# Patient Record
Sex: FEMALE | Race: WHITE | NOT HISPANIC OR LATINO | Employment: FULL TIME | URBAN - METROPOLITAN AREA
[De-identification: names, ages, dates, MRNs, and addresses within clinical notes are randomized per-mention and may not be internally consistent; named-entity substitution may affect disease eponyms.]

---

## 2017-03-03 ENCOUNTER — OFFICE VISIT (OUTPATIENT)
Dept: RETAIL CLINIC | Facility: CLINIC | Age: 22
End: 2017-03-03

## 2017-03-03 VITALS
TEMPERATURE: 99 F | OXYGEN SATURATION: 99 % | DIASTOLIC BLOOD PRESSURE: 75 MMHG | HEIGHT: 68 IN | WEIGHT: 110 LBS | HEART RATE: 102 BPM | SYSTOLIC BLOOD PRESSURE: 100 MMHG | BODY MASS INDEX: 16.67 KG/M2

## 2017-03-03 DIAGNOSIS — H61.23 EXCESSIVE CERUMEN IN EAR CANAL, BILATERAL: ICD-10-CM

## 2017-03-03 DIAGNOSIS — R68.89 INFLUENZA-LIKE SYMPTOMS: Primary | ICD-10-CM

## 2017-03-03 LAB
EXPIRATION DATE: NORMAL
FLUAV AG NPH QL: NEGATIVE
FLUBV AG NPH QL: NEGATIVE
INTERNAL CONTROL: NORMAL
Lab: NORMAL

## 2017-03-03 PROCEDURE — 99213 OFFICE O/P EST LOW 20 MIN: CPT | Performed by: NURSE PRACTITIONER

## 2017-03-03 PROCEDURE — 87804 INFLUENZA ASSAY W/OPTIC: CPT | Performed by: NURSE PRACTITIONER

## 2017-03-03 RX ORDER — OSELTAMIVIR PHOSPHATE 75 MG/1
75 CAPSULE ORAL 2 TIMES DAILY
Qty: 10 CAPSULE | Refills: 0 | Status: SHIPPED | OUTPATIENT
Start: 2017-03-03 | End: 2017-03-08

## 2017-03-03 RX ORDER — UREA 10 %
LOTION (ML) TOPICAL
COMMUNITY
End: 2018-10-09

## 2017-03-03 RX ORDER — GUAIFENESIN 600 MG/1
1200 TABLET, EXTENDED RELEASE ORAL 2 TIMES DAILY
Start: 2017-03-03 | End: 2018-10-09

## 2017-03-03 RX ORDER — SERTRALINE HYDROCHLORIDE 100 MG/1
100 TABLET, FILM COATED ORAL DAILY
COMMUNITY
End: 2018-10-09

## 2017-03-03 RX ORDER — DEXTROMETHORPHAN HYDROBROMIDE AND PROMETHAZINE HYDROCHLORIDE 15; 6.25 MG/5ML; MG/5ML
5 SYRUP ORAL 4 TIMES DAILY PRN
Qty: 100 ML | Refills: 0 | Status: SHIPPED | OUTPATIENT
Start: 2017-03-03 | End: 2017-03-10

## 2017-03-03 RX ORDER — DEXTROAMPHETAMINE SACCHARATE, AMPHETAMINE ASPARTATE MONOHYDRATE, DEXTROAMPHETAMINE SULFATE AND AMPHETAMINE SULFATE 2.5; 2.5; 2.5; 2.5 MG/1; MG/1; MG/1; MG/1
10 CAPSULE, EXTENDED RELEASE ORAL EVERY MORNING
COMMUNITY
End: 2018-10-09

## 2017-03-03 RX ORDER — DOXEPIN HYDROCHLORIDE 10 MG/1
10 CAPSULE ORAL NIGHTLY
COMMUNITY
End: 2018-10-09

## 2017-03-03 NOTE — PROGRESS NOTES
Shanique Humphrey is a 21 y.o. female presents with the following:    Flu Symptoms   This is a new problem. The current episode started yesterday. The problem occurs constantly. The problem has been gradually worsening. Associated symptoms include anorexia, chills, coughing, fatigue, a fever, myalgias and a sore throat. Pertinent negatives include no congestion. The symptoms are aggravated by exertion. She has tried acetaminophen for the symptoms. The treatment provided no relief.       The following portions of the patient's history were reviewed and updated as appropriate: allergies, current medications, past family history, past medical history, past social history, past surgical history and problem list.    Review of Systems   Constitutional: Positive for chills, fatigue and fever.   HENT: Positive for postnasal drip and sore throat. Negative for congestion, ear pain and rhinorrhea.    Eyes: Negative.    Respiratory: Positive for cough.    Cardiovascular: Negative.    Gastrointestinal: Positive for anorexia.   Musculoskeletal: Positive for myalgias.   Skin: Negative.    Psychiatric/Behavioral: Negative.        Objective   Physical Exam   Constitutional: She is oriented to person, place, and time. She appears well-developed. She appears ill.   HENT:   Right Ear: External ear and ear canal normal. Right ear middle ear effusion: mild.   Left Ear: External ear and ear canal normal. Left ear middle ear effusion: mild.   Nose: Nose normal.   Mouth/Throat: Mucous membranes are normal. Posterior oropharyngeal erythema present. No tonsillar exudate.   Unable to view TM- impacted cerumen     Eyes: Pupils are equal, round, and reactive to light.   Cardiovascular: Normal rate, regular rhythm and normal heart sounds.    Pulmonary/Chest: Effort normal and breath sounds normal.   Lymphadenopathy:     She has cervical adenopathy.        Right cervical: Superficial cervical adenopathy present.        Left cervical:  Superficial cervical adenopathy present.   Neurological: She is alert and oriented to person, place, and time.   Skin: Skin is warm and dry. There is pallor.   Vitals reviewed.    Vitals:    03/03/17 1449   BP: 100/75   Pulse: 102   Temp: 99 °F (37.2 °C)   SpO2: 99%     Assessment/Plan   Kelly was seen today for flu symptoms.    Diagnoses and all orders for this visit:    Influenza-like symptoms  -     POCT Influenza A/B  -     oseltamivir (TAMIFLU) 75 MG capsule; Take 1 capsule by mouth 2 (Two) Times a Day for 5 days.  -     promethazine-dextromethorphan (PROMETHAZINE-DM) 6.25-15 MG/5ML syrup; Take 5 mL by mouth 4 (Four) Times a Day As Needed for cough for up to 7 days.  -     guaiFENesin (MUCINEX) 600 MG 12 hr tablet; Take 2 tablets by mouth 2 (Two) Times a Day.    Excessive cerumen in ear canal, bilateral  -     carbamide peroxide (DEBROX) 6.5 % otic solution; Administer 5 drops into both ears 2 (Two) Times a Day for 4 days.       Reviewed home care instructions, and patient verbalized understanding. Patient instructed to follow up with PCP and/or ED for worsening or non-resolving symptoms.     MIGUEL Conrad

## 2017-03-03 NOTE — PATIENT INSTRUCTIONS
"Influenza, Adult  Influenza (\"the flu\") is a viral infection of the respiratory tract. It occurs more often in winter months because people spend more time in close contact with one another. Influenza can make you feel very sick. Influenza easily spreads from person to person (contagious).  CAUSES   Influenza is caused by a virus that infects the respiratory tract. You can catch the virus by breathing in droplets from an infected person's cough or sneeze. You can also catch the virus by touching something that was recently contaminated with the virus and then touching your mouth, nose, or eyes.  RISKS AND COMPLICATIONS  You may be at risk for a more severe case of influenza if you smoke cigarettes, have diabetes, have chronic heart disease (such as heart failure) or lung disease (such as asthma), or if you have a weakened immune system. Elderly people and pregnant women are also at risk for more serious infections. The most common problem of influenza is a lung infection (pneumonia). Sometimes, this problem can require emergency medical care and may be life threatening.  SIGNS AND SYMPTOMS   Symptoms typically last 4 to 10 days and may include:  · Fever.  · Chills.  · Headache, body aches, and muscle aches.  · Sore throat.  · Chest discomfort and cough.  · Poor appetite.  · Weakness or feeling tired.  · Dizziness.  · Nausea or vomiting.  DIAGNOSIS   Diagnosis of influenza is often made based on your history and a physical exam. A nose or throat swab test can be done to confirm the diagnosis.  TREATMENT   In mild cases, influenza goes away on its own. Treatment is directed at relieving symptoms. For more severe cases, your health care provider may prescribe antiviral medicines to shorten the sickness. Antibiotic medicines are not effective because the infection is caused by a virus, not by bacteria.  HOME CARE INSTRUCTIONS  · Take medicines only as directed by your health care provider.  · Use a cool mist humidifier " to make breathing easier.  · Get plenty of rest until your temperature returns to normal. This usually takes 3 to 4 days.  · Drink enough fluid to keep your urine clear or pale yellow.  · Cover your mouth and nose when coughing or sneezing, and wash your hands well to prevent the virus from spreading.  · Stay home from work or school until the fever is gone for at least 1 full day.  PREVENTION   An annual influenza vaccination (flu shot) is the best way to avoid getting influenza. An annual flu shot is now routinely recommended for all adults in the [place]  SEEK MEDICAL CARE IF:  · You experience chest pain, your cough worsens, or you produce more mucus.  · You have nausea, vomiting, or diarrhea.  · Your fever returns or gets worse.  SEEK IMMEDIATE MEDICAL CARE IF:  · You have trouble breathing, you become short of breath, or your skin or nails become bluish.  · You have severe pain or stiffness in the neck.  · You develop a sudden headache, or pain in the face or ear.  · You have nausea or vomiting that you cannot control.  MAKE SURE YOU:   · Understand these instructions.  · Will watch your condition.  · Will get help right away if you are not doing well or get worse.     This information is not intended to replace advice given to you by your health care provider. Make sure you discuss any questions you have with your health care provider.     Document Released: 12/15/2001 Document Revised: 01/08/2016 Document Reviewed: 10/11/2016  [employer] Patient Education ©2016 [employer]

## 2020-01-06 ENCOUNTER — TELEPHONE (OUTPATIENT)
Dept: URGENT CARE | Facility: CLINIC | Age: 25
End: 2020-01-06

## 2020-01-06 NOTE — TELEPHONE ENCOUNTER
MsDai Humphrey called stating the augmentin is upsetting her stomach, advised will change to Doxycyline BID x 7 ways. RX sent to [facility].

## 2020-03-03 ENCOUNTER — LAB (OUTPATIENT)
Dept: LAB | Facility: HOSPITAL | Age: 25
End: 2020-03-03

## 2020-03-03 ENCOUNTER — OFFICE VISIT (OUTPATIENT)
Dept: INTERNAL MEDICINE | Facility: CLINIC | Age: 25
End: 2020-03-03

## 2020-03-03 VITALS
TEMPERATURE: 97.6 F | RESPIRATION RATE: 16 BRPM | WEIGHT: 118.6 LBS | SYSTOLIC BLOOD PRESSURE: 102 MMHG | OXYGEN SATURATION: 98 % | BODY MASS INDEX: 17.98 KG/M2 | HEART RATE: 62 BPM | DIASTOLIC BLOOD PRESSURE: 64 MMHG | HEIGHT: 68 IN

## 2020-03-03 DIAGNOSIS — Z00.00 HEALTHCARE MAINTENANCE: ICD-10-CM

## 2020-03-03 DIAGNOSIS — G43.709 CHRONIC MIGRAINE WITHOUT AURA WITHOUT STATUS MIGRAINOSUS, NOT INTRACTABLE: ICD-10-CM

## 2020-03-03 DIAGNOSIS — F90.9 ATTENTION DEFICIT HYPERACTIVITY DISORDER (ADHD), UNSPECIFIED ADHD TYPE: Primary | ICD-10-CM

## 2020-03-03 DIAGNOSIS — Z30.41 ENCOUNTER FOR SURVEILLANCE OF CONTRACEPTIVE PILLS: ICD-10-CM

## 2020-03-03 LAB
ALBUMIN SERPL-MCNC: 4.3 G/DL (ref 3.5–5.2)
ALBUMIN/GLOB SERPL: 1.3 G/DL
ALP SERPL-CCNC: 32 U/L (ref 39–117)
ALT SERPL W P-5'-P-CCNC: 11 U/L (ref 1–33)
ANION GAP SERPL CALCULATED.3IONS-SCNC: 13 MMOL/L (ref 5–15)
AST SERPL-CCNC: 17 U/L (ref 1–32)
BILIRUB SERPL-MCNC: 0.6 MG/DL (ref 0.2–1.2)
BUN BLD-MCNC: 9 MG/DL (ref 6–20)
BUN/CREAT SERPL: 11.8 (ref 7–25)
CALCIUM SPEC-SCNC: 9.4 MG/DL (ref 8.6–10.5)
CHLORIDE SERPL-SCNC: 103 MMOL/L (ref 98–107)
CO2 SERPL-SCNC: 24 MMOL/L (ref 22–29)
CREAT BLD-MCNC: 0.76 MG/DL (ref 0.57–1)
DEPRECATED RDW RBC AUTO: 43.9 FL (ref 37–54)
ERYTHROCYTE [DISTWIDTH] IN BLOOD BY AUTOMATED COUNT: 13.6 % (ref 12.3–15.4)
GFR SERPL CREATININE-BSD FRML MDRD: 93 ML/MIN/1.73
GLOBULIN UR ELPH-MCNC: 3.2 GM/DL
GLUCOSE BLD-MCNC: 75 MG/DL (ref 65–99)
HCT VFR BLD AUTO: 36.9 % (ref 34–46.6)
HGB BLD-MCNC: 12.3 G/DL (ref 12–15.9)
MCH RBC QN AUTO: 29.2 PG (ref 26.6–33)
MCHC RBC AUTO-ENTMCNC: 33.3 G/DL (ref 31.5–35.7)
MCV RBC AUTO: 87.6 FL (ref 79–97)
PLATELET # BLD AUTO: 374 10*3/MM3 (ref 140–450)
PMV BLD AUTO: 11.4 FL (ref 6–12)
POTASSIUM BLD-SCNC: 4.4 MMOL/L (ref 3.5–5.2)
PROT SERPL-MCNC: 7.5 G/DL (ref 6–8.5)
RBC # BLD AUTO: 4.21 10*6/MM3 (ref 3.77–5.28)
SODIUM BLD-SCNC: 140 MMOL/L (ref 136–145)
WBC NRBC COR # BLD: 6.74 10*3/MM3 (ref 3.4–10.8)

## 2020-03-03 PROCEDURE — 99204 OFFICE O/P NEW MOD 45 MIN: CPT | Performed by: INTERNAL MEDICINE

## 2020-03-03 PROCEDURE — 80053 COMPREHEN METABOLIC PANEL: CPT | Performed by: INTERNAL MEDICINE

## 2020-03-03 PROCEDURE — 85027 COMPLETE CBC AUTOMATED: CPT | Performed by: INTERNAL MEDICINE

## 2020-03-03 RX ORDER — NORETHINDRONE ACETATE AND ETHINYL ESTRADIOL 1; 5 MG/1; UG/1
1 TABLET ORAL DAILY
Qty: 28 TABLET | Refills: 5 | Status: SHIPPED | OUTPATIENT
Start: 2020-03-03 | End: 2020-06-17

## 2020-03-03 NOTE — PROGRESS NOTES
Internal Medicine New Patient  Kelly Humphrey is a 24 y.o. female who presents today to establish care and with concerns as outlined below.    Chief Complaint  Chief Complaint   Patient presents with   • Establish Care        HPI  Ms. Humphrey comes in today to establish care. 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 Plans maybe to go back to graduate school for veterinary sciences. She has not yet established care in town. Her doctor in [place] was Dr. Forrester. She is only taking an OCP at this time. Her last pap smear was over a year ago at  [email or web address]. Normal.    She has acne and previously was treated by her dermatologist in [place].     She has a history of ADHD and was on treatment through  psychiatry. She took aderall which helped her to concentrate. Since finishing school she has stopped taking aderall and no longer has a psychiatrist. She notes difficulty concentrating when reading and doing tasks at home predominantly. Not having any issues at work. She is interested in resuming aderall, however, given that she plans to potentially enroll in grad school.    She has migraines around time of menstruation. No aura. Treats with excedrin migraine and rest. She states that she works to stay well hydrated to help prevent migraines.       Review of Systems  Review of Systems   Constitutional: Negative.    Eyes: Negative.    Respiratory: Negative.    Cardiovascular: Negative.    Gastrointestinal: Negative.    Genitourinary: Negative.    Musculoskeletal: Negative.    Skin: Positive for rash (acne).   Neurological: Positive for headache (migraines).   Psychiatric/Behavioral: Positive for behavioral problems and decreased concentration. Negative for depressed mood. The patient is nervous/anxious.         Past Medical History  Past Medical History:   Diagnosis Date   • ADHD (attention deficit hyperactivity disorder)    • Anxiety      "    Surgical History  Past Surgical History:   Procedure Laterality Date   • TONSILLECTOMY  2017        Family History  Family History   Adopted: Yes   Family history unknown: Yes        Social History  Social History     Socioeconomic History   • Marital status: Single     Spouse name: Not on file   • Number of children: Not on file   • Years of education: Not on file   • Highest education level: Not on file   Tobacco Use   • Smoking status: Never Smoker   • Smokeless tobacco: Never Used   Substance and Sexual Activity   • Alcohol use: No   • Drug use: No   • Sexual activity: Defer        Current Medications  Current Outpatient Medications on File Prior to Visit   Medication Sig Dispense Refill   • norethindrone-ethinyl estradiol (FEMHRT 1/5) 1-5 MG-MCG tablet Take  by mouth Daily.       No current facility-administered medications on file prior to visit.        Allergies  Allergies   Allergen Reactions   • Augmentin [Amoxicillin-Pot Clavulanate] Hives        Objective  Visit Vitals  /64   Pulse 62   Temp 97.6 °F (36.4 °C)   Resp 16   Ht 172.7 cm (67.99\")   Wt 53.8 kg (118 lb 9.6 oz)   SpO2 98%   BMI 18.04 kg/m²        Physical Exam  Physical Exam   Constitutional: She is oriented to person, place, and time. She appears well-developed and well-nourished. No distress.   HENT:   Head: Normocephalic and atraumatic.   Right Ear: External ear normal.   Left Ear: External ear normal.   Nose: Nose normal.   Mouth/Throat: Oropharynx is clear and moist. No oropharyngeal exudate.   Eyes: Pupils are equal, round, and reactive to light. Conjunctivae and EOM are normal. No scleral icterus.   Neck: Neck supple. No thyromegaly present.   Cardiovascular: Normal rate, regular rhythm, normal heart sounds and intact distal pulses.   No murmur heard.  Pulmonary/Chest: Effort normal and breath sounds normal. No respiratory distress.   Abdominal: Soft. Bowel sounds are normal. She exhibits no distension. There is no tenderness. "   Musculoskeletal: She exhibits no edema or deformity.   Lymphadenopathy:     She has no cervical adenopathy.   Neurological: She is alert and oriented to person, place, and time.   Skin: Skin is warm and dry. No rash noted. She is not diaphoretic.   Acne along mandible    Psychiatric: She has a normal mood and affect. Her behavior is normal. Judgment and thought content normal.   Nursing note and vitals reviewed.       Results  No results associated with this encounter.     Assessment and Plan  Kelly was seen today for establish care.    Diagnoses and all orders for this visit:    Attention deficit hyperactivity disorder (ADHD), unspecified ADHD type  - Previously treated with aderall by  psychiatry but no longer a student so has discontinued medication and no longer seeing psychiatry there.  - She is worried that she will not be able to perform well in grad school and is interested in starting aderall again. Referred to psychiatry. Discussed that this process is different than other referrals and I recommended she begin looking for a psychiatrist now.    Chronic migraine without aura without status migrainosus, not intractable  - Occurs once a month at onset of menstruation and is treated with OTC pain relievers and rest    Encounter for surveillance of contraceptive pills  - Started on OCP previously and requests refill today  - She denies history of blood clots, family hx of clotting disorder, migraine with aura.  - Pap smear reportedly last year, normal. Records requested from [facility].  - OCP refilled.    Healthcare maintenance  - CBC and CMP ordered    Health Maintenance   Topic Date Due   • ANNUAL PHYSICAL  06/29/1998   • HPV VACCINES (1 - Female 2-dose series) 06/29/2006   • TDAP/TD VACCINES (1 - Tdap) 06/29/2006   • INFLUENZA VACCINE  08/01/2019   • CHLAMYDIA SCREENING  01/06/2020   • PAP SMEAR  01/06/2020     Health Maintenance  - Pap smear: She reports last pap smear a year ago at [facility]. Results normal. Records requested.  - Mammogram: Start screening at age 40.  - Colonoscopy: Start screening at age 50.  - Immunizations: Flu unavailable in office, recommended getting immunized at local pharmacy. She believes Tdap is UTD. Records from [facility] requested.  - Depression screening: negative 3/2020    Return in about 1 year (around 3/3/2021) for Annual, Labs today.

## 2020-03-23 ENCOUNTER — E-VISIT (OUTPATIENT)
Dept: INTERNAL MEDICINE | Facility: CLINIC | Age: 25
End: 2020-03-23

## 2020-03-24 NOTE — PROGRESS NOTES
After review of patient's E visit questionnaire it was decided that it would be in her best interest to schedule an in person office visit rather than an e visit so that diagnostic testing could be obtained. E visit was not completed and patient should not be billed. She was informed of these recommendations via [email or web address] messaging.

## 2020-03-26 ENCOUNTER — APPOINTMENT (OUTPATIENT)
Dept: LAB | Facility: HOSPITAL | Age: 25
End: 2020-03-26

## 2020-03-26 ENCOUNTER — OFFICE VISIT (OUTPATIENT)
Dept: INTERNAL MEDICINE | Facility: CLINIC | Age: 25
End: 2020-03-26

## 2020-03-26 VITALS
WEIGHT: 120 LBS | HEIGHT: 68 IN | HEART RATE: 76 BPM | TEMPERATURE: 97.6 F | DIASTOLIC BLOOD PRESSURE: 60 MMHG | OXYGEN SATURATION: 96 % | BODY MASS INDEX: 18.19 KG/M2 | SYSTOLIC BLOOD PRESSURE: 100 MMHG

## 2020-03-26 DIAGNOSIS — R35.0 URINARY FREQUENCY: Primary | ICD-10-CM

## 2020-03-26 DIAGNOSIS — Z76.89 ENCOUNTER FOR ASSESSMENT OF SEXUALLY TRANSMITTED DISEASE EXPOSURE: ICD-10-CM

## 2020-03-26 DIAGNOSIS — A74.9 CHLAMYDIA: ICD-10-CM

## 2020-03-26 LAB
B-HCG UR QL: NEGATIVE
BILIRUB BLD-MCNC: NEGATIVE MG/DL
CLARITY, POC: ABNORMAL
COLOR UR: ABNORMAL
GLUCOSE UR STRIP-MCNC: NEGATIVE MG/DL
INTERNAL NEGATIVE CONTROL: NEGATIVE
INTERNAL POSITIVE CONTROL: POSITIVE
KETONES UR QL: NEGATIVE
LEUKOCYTE EST, POC: ABNORMAL
Lab: NORMAL
NITRITE UR-MCNC: NEGATIVE MG/ML
PH UR: 6 [PH] (ref 5–8)
PROT UR STRIP-MCNC: NEGATIVE MG/DL
RBC # UR STRIP: NEGATIVE /UL
SP GR UR: 1.03 (ref 1–1.03)
UROBILINOGEN UR QL: NORMAL

## 2020-03-26 PROCEDURE — 99213 OFFICE O/P EST LOW 20 MIN: CPT | Performed by: NURSE PRACTITIONER

## 2020-03-26 PROCEDURE — 87491 CHLMYD TRACH DNA AMP PROBE: CPT | Performed by: NURSE PRACTITIONER

## 2020-03-26 PROCEDURE — 87591 N.GONORRHOEAE DNA AMP PROB: CPT | Performed by: NURSE PRACTITIONER

## 2020-03-26 PROCEDURE — 87086 URINE CULTURE/COLONY COUNT: CPT | Performed by: NURSE PRACTITIONER

## 2020-03-26 PROCEDURE — 81025 URINE PREGNANCY TEST: CPT | Performed by: NURSE PRACTITIONER

## 2020-03-26 PROCEDURE — 87661 TRICHOMONAS VAGINALIS AMPLIF: CPT | Performed by: NURSE PRACTITIONER

## 2020-03-26 PROCEDURE — 81003 URINALYSIS AUTO W/O SCOPE: CPT | Performed by: NURSE PRACTITIONER

## 2020-03-26 RX ORDER — NITROFURANTOIN 25; 75 MG/1; MG/1
100 CAPSULE ORAL EVERY 12 HOURS SCHEDULED
Qty: 10 CAPSULE | Refills: 0 | Status: SHIPPED | OUTPATIENT
Start: 2020-03-26 | End: 2020-03-30

## 2020-03-26 NOTE — PROGRESS NOTES
Chief Complaint   Patient presents with   • Urinary Tract Infection     Wants a STI check       History of Present Illness  24 y.o.female presents for uti sx.  C/o Urinary frequency; no dysuria but feels like not emptying bladder all the way.  No abd or flank pain.  No fever.  Some cloudy vaginal discharge no vaginal pain. No pain with sex. Positive new sex partner couple weeks ago. Wanting std check on urine.  No n/v/d.    Review of Systems   Constitutional: Negative for chills, fatigue and fever.   Respiratory: Negative for shortness of breath.    Cardiovascular: Negative for chest pain.   Gastrointestinal: Negative for abdominal pain, constipation, diarrhea, nausea and vomiting.   Genitourinary: Positive for frequency. Negative for difficulty urinating, dysuria, flank pain, hematuria and urgency.   Musculoskeletal: Negative for myalgias.         [facility]  The following portions of the patient's history were reviewed and updated as appropriate: allergies, current medications, past family history, past medical history, past social history, past surgical history and problem list.     Past Medical History:   Diagnosis Date   • ADHD (attention deficit hyperactivity disorder)    • Anxiety       Past Surgical History:   Procedure Laterality Date   • TONSILLECTOMY  2017      Allergies   Allergen Reactions   • Augmentin [Amoxicillin-Pot Clavulanate] Hives      Social History     Socioeconomic History   • Marital status: Single     Spouse name: Not on file   • Number of children: Not on file   • Years of education: Not on file   • Highest education level: Not on file   Tobacco Use   • Smoking status: Never Smoker   • Smokeless tobacco: Never Used   Substance and Sexual Activity   • Alcohol use: Yes     Comment: occasional beer, once every 2 weeks   • Drug use: No   • Sexual activity: Yes     Partners: Male     Birth control/protection: OCP, Condom     Comment: single, steady partner     Family History   Adopted: Yes   Family  "history unknown: Yes            Current Outpatient Medications:   •  norethindrone-ethinyl estradiol (FEMHRT 1/5) 1-5 MG-MCG tablet, Take 1 tablet by mouth Daily., Disp: 28 tablet, Rfl: 5    VITALS:  /60   Pulse 76   Temp 97.6 °F (36.4 °C)   Ht 172.7 cm (68\")   Wt 54.4 kg (120 lb)   SpO2 96%   BMI 18.25 kg/m²     Physical Exam   Constitutional: She is oriented to person, place, and time. She appears well-developed and well-nourished. No distress.   Cardiovascular: Normal rate.   Pulmonary/Chest: Effort normal.   Abdominal: There is no tenderness. There is no CVA tenderness.   Neurological: She is alert and oriented to person, place, and time.   Skin: Skin is warm and dry. No rash noted.       LABS  Results for orders placed or performed in visit on 03/26/20   Chlamydia trachomatis, Neisseria gonorrhoeae, PCR - Urine, Urine, Clean Catch   Result Value Ref Range    Chlamydia trachomatis, GUY Positive (A) Negative    Neisseria gonorrhoeae, GUY Negative Negative   Trichomonas vaginalis, PCR - Urine, Urine, Clean Catch   Result Value Ref Range    Trichomonas vaginosis Negative Negative   Urine Culture - Urine, Urine, Clean Catch   Result Value Ref Range    Urine Culture >100,000 CFU/mL Lactobacillus species (A)    POCT urinalysis dipstick, automated   Result Value Ref Range    Color Dark Yellow Yellow, Straw, Dark Yellow, Ana    Clarity, UA Cloudy (A) Clear    Specific Gravity  1.030 1.005 - 1.030    pH, Urine 6.0 5.0 - 8.0    Leukocytes Trace (A) Negative    Nitrite, UA Negative Negative    Protein, POC Negative Negative mg/dL    Glucose, UA Negative Negative, 1000 mg/dL (3+) mg/dL    Ketones, UA Negative Negative    Urobilinogen, UA Normal Normal    Bilirubin Negative Negative    Blood, UA Negative Negative   POCT pregnancy, urine   Result Value Ref Range    HCG, Urine, QL Negative Negative    Lot Number [id number]     Internal Positive Control Positive     Internal Negative Control Negative  " "      ASSESSMENT/PLAN  Kelly was seen today for urinary tract infection.    Diagnoses and all orders for this visit:    Urinary frequency  -     POCT urinalysis dipstick, automated  -     POCT pregnancy, urine  -     Urine Culture - Urine, Urine, Clean Catch  -     nitrofurantoin, macrocrystal-monohydrate, (MACROBID) 100 MG capsule; Take 1 capsule by mouth Every 12 (Twelve) Hours for 5 days.  Discussed with pt; started tx for UTI while awaiting results.   ADDENDUM:   Can stop macrobid.  Lactobacillus noted on urine culture; likely normal nidhi contaminant in setting of STD.    Encounter for assessment of sexually transmitted disease exposure  -     Chlamydia trachomatis, Neisseria gonorrhoeae, PCR - Urine, Urine, Clean Catch  -     Trichomonas vaginalis, PCR - Urine, Urine, Clean Catch    ADDENDUM\"  Chlamydia  Comments:  azithromycin 500mg tab; take 2 tabs by mouth once.  abstain from sexual intercourse for 7 days.  Partner needs treatment during same timeframe.  Advised of safe sex practices.      I discussed the patients findings and my recommendations with patient.  Patient was encouraged to keep me informed of any acute changes, lack of improvement, or any new concerning symptoms.  Patient voiced understanding of all instructions and denied further questions.      FOLLOW-UP  Return if symptoms worsen or fail to improve.    Electronically signed by:    MIGUEL Garnica  03/26/2020    EMR Dragon/Transcription Disclaimer:  Much of this encounter note is an electronic transcription/translation of spoken language to printed text.  The electronic translation of spoken language may permit erroneous, or at times, nonsensical words or phrases to be inadvertently transcribed.  Although I have reviewed the note for such errors, some may still exist    "

## 2020-03-28 LAB — BACTERIA SPEC AEROBE CULT: ABNORMAL

## 2020-03-30 ENCOUNTER — TELEPHONE (OUTPATIENT)
Dept: INTERNAL MEDICINE | Facility: CLINIC | Age: 25
End: 2020-03-30

## 2020-03-30 LAB
C TRACH RRNA SPEC DONR QL NAA+PROBE: POSITIVE
N GONORRHOEA DNA SPEC QL NAA+PROBE: NEGATIVE
T VAGINALIS RRNA GENITAL QL PROBE: NEGATIVE

## 2020-03-30 RX ORDER — AZITHROMYCIN 500 MG/1
1000 TABLET, FILM COATED ORAL ONCE
Qty: 2 TABLET | Refills: 0 | Status: SHIPPED | OUTPATIENT
Start: 2020-03-30 | End: 2020-03-30

## 2020-03-30 NOTE — TELEPHONE ENCOUNTER
I called pt [facility]. (when she returns call if you will please transfer call to my office, as I need to speak with pt.)

## 2020-03-30 NOTE — TELEPHONE ENCOUNTER
I spoke to pt; advised positive chlamydia. I sent rx azithromycin. Stop macrobid. lactobacillus in urine is human nidhi found in vagina; likely contaminant in setting of std. Advised no sex for 7 days; partner also needs to be treated during same timeframe. Advised safe sex practices condom use. Pt verbalized understanding and no further questions.

## 2020-05-28 ENCOUNTER — E-VISIT (OUTPATIENT)
Dept: INTERNAL MEDICINE | Facility: CLINIC | Age: 25
End: 2020-05-28

## 2020-05-28 DIAGNOSIS — N30.00 ACUTE CYSTITIS WITHOUT HEMATURIA: Primary | ICD-10-CM

## 2020-05-28 PROCEDURE — 99421 OL DIG E/M SVC 5-10 MIN: CPT | Performed by: NURSE PRACTITIONER

## 2020-05-29 RX ORDER — NITROFURANTOIN 25; 75 MG/1; MG/1
100 CAPSULE ORAL EVERY 12 HOURS SCHEDULED
Qty: 10 CAPSULE | Refills: 0 | OUTPATIENT
Start: 2020-05-29 | End: 2020-06-03

## 2020-06-12 NOTE — PROGRESS NOTES
Subjective   Kelly Humphrey is a 24 y.o. female.   Chief complaint UTI symptoms  History of Present Illness   Complains of frequent urination and urgency with cloudy urine onset more than 1 week.  Positive vaginal discharge with white slightly yellow.  No fever.  Had been seen a few months back and tested positive for STI treated with antibiotics.    The following portions of the patient's history were reviewed and updated as appropriate: allergies, current medications, past family history, past medical history, past social history, past surgical history and problem list.    Review of Systems   General negative fever   positive dysuria and urinary frequency.  Positive vaginal discharge.  Objective   Physical Exam  None telehealth  Assessment/Plan   Problems Addressed this Visit     None      Visit Diagnoses     Acute cystitis without hematuria    -  Primary      Rx for nitrofurantoin Macrobid 100 mg capsule by mouth 1 capsule every 12 hours for 5 days.  Increase water intake.  If symptoms do not resolve return for follow-up appointment or follow-up with PCP.  E visit total time 10 minutes  Cinda RIVERA

## 2020-06-17 ENCOUNTER — OFFICE VISIT (OUTPATIENT)
Dept: INTERNAL MEDICINE | Facility: CLINIC | Age: 25
End: 2020-06-17

## 2020-06-17 VITALS
TEMPERATURE: 98.5 F | OXYGEN SATURATION: 99 % | WEIGHT: 116 LBS | HEART RATE: 76 BPM | RESPIRATION RATE: 16 BRPM | BODY MASS INDEX: 17.58 KG/M2 | DIASTOLIC BLOOD PRESSURE: 62 MMHG | HEIGHT: 68 IN | SYSTOLIC BLOOD PRESSURE: 100 MMHG

## 2020-06-17 DIAGNOSIS — L30.9 DERMATITIS: ICD-10-CM

## 2020-06-17 DIAGNOSIS — Z30.41 ENCOUNTER FOR SURVEILLANCE OF CONTRACEPTIVE PILLS: Primary | ICD-10-CM

## 2020-06-17 PROCEDURE — 99213 OFFICE O/P EST LOW 20 MIN: CPT | Performed by: INTERNAL MEDICINE

## 2020-06-17 RX ORDER — DROSPIRENONE, ETHINYL ESTRADIOL AND LEVOMEFOLATE CALCIUM AND LEVOMEFOLATE CALCIUM 3-0.02(24)
KIT ORAL
COMMUNITY
End: 2020-06-17 | Stop reason: SDUPTHER

## 2020-06-17 RX ORDER — DROSPIRENONE, ETHINYL ESTRADIOL AND LEVOMEFOLATE CALCIUM AND LEVOMEFOLATE CALCIUM 3-0.02(24)
1 KIT ORAL DAILY
Qty: 28 TABLET | Refills: 5 | Status: SHIPPED | OUTPATIENT
Start: 2020-06-17 | End: 2020-12-02

## 2020-06-17 RX ORDER — TRIAMCINOLONE ACETONIDE 1 MG/G
CREAM TOPICAL 2 TIMES DAILY
Qty: 30 G | Refills: 1 | Status: SHIPPED | OUTPATIENT
Start: 2020-06-17 | End: 2021-02-15

## 2020-06-17 NOTE — PROGRESS NOTES
"Internal Medicine Acute Visit    Chief Complaint   Patient presents with   • Rash     Navel x 2 weeks   • Amenorrhea        HPI  Ms. Humphrey comes in today for evaluation of several skin concerns. 2 weeks ago she was seen in UC for small abcsess on her buttock. She was prescribed clindamycin TID and mupirocin. This improved but has not resolved.  Since then she has had additional itchy, painful spots show up on her arms, stomach, thigh. They have not resolved. The one in the umbilical area has leaked some purulent fluid. She has used mupirocin on this and it has not made a difference. No fever.    She additionally notes that OCP sent at last visit was different than prior script. It did not contain week of nonhormone containing pills. She has not menstruated since starting new OCP. Has not missed OCP. Notes that prior OCP was generic for Beyaz.       Review of Systems  Review of Systems   Constitutional: Negative.    Genitourinary: Positive for amenorrhea.   Skin: Positive for rash.        Medications  Current Outpatient Medications on File Prior to Visit   Medication Sig Dispense Refill   • mupirocin (BACTROBAN) 2 % ointment Apply  topically to the appropriate area as directed 3 (Three) Times a Day. Left buttock 22 g 0   • norethindrone-ethinyl estradiol (FEMHRT 1/5) 1-5 MG-MCG tablet Take 1 tablet by mouth Daily. 28 tablet 5     No current facility-administered medications on file prior to visit.         Allergies  Allergies   Allergen Reactions   • Augmentin [Amoxicillin-Pot Clavulanate] Hives       PMH  Past Medical History:   Diagnosis Date   • ADHD (attention deficit hyperactivity disorder)    • Anxiety        Objective  Visit Vitals  /62   Pulse 76   Temp 98.5 °F (36.9 °C)   Resp 16   Ht 173 cm (68.11\")   Wt 52.6 kg (116 lb)   SpO2 99%   Breastfeeding No   BMI 17.58 kg/m²        Physical Exam  Physical Exam   Constitutional: She is oriented to person, place, and time. She appears well-developed and " well-nourished. No distress.   HENT:   Head: Normocephalic and atraumatic.   Eyes: Conjunctivae are normal.   Pulmonary/Chest: Effort normal. No respiratory distress.   Musculoskeletal: She exhibits no edema.   Neurological: She is alert and oriented to person, place, and time.   Skin: Skin is warm and dry. Rash (small excoriated papules distributed on thigh, stomach, forearm, and in navel. No palmar involvement. Prior buttock abscess well healed, barely visible.) noted.   Psychiatric: She has a normal mood and affect.   Nursing note and vitals reviewed.      Results  Results for orders placed or performed in visit on 03/26/20   Chlamydia trachomatis, Neisseria gonorrhoeae, PCR - Urine, Urine, Clean Catch   Result Value Ref Range    Chlamydia trachomatis, GUY Positive (A) Negative    Neisseria gonorrhoeae, GUY Negative Negative   Trichomonas vaginalis, PCR - Urine, Urine, Clean Catch   Result Value Ref Range    Trichomonas vaginosis Negative Negative   Urine Culture - Urine, Urine, Clean Catch   Result Value Ref Range    Urine Culture >100,000 CFU/mL Lactobacillus species (A)    POCT urinalysis dipstick, automated   Result Value Ref Range    Color Dark Yellow Yellow, Straw, Dark Yellow, Ana    Clarity, UA Cloudy (A) Clear    Specific Gravity  1.030 1.005 - 1.030    pH, Urine 6.0 5.0 - 8.0    Leukocytes Trace (A) Negative    Nitrite, UA Negative Negative    Protein, POC Negative Negative mg/dL    Glucose, UA Negative Negative, 1000 mg/dL (3+) mg/dL    Ketones, UA Negative Negative    Urobilinogen, UA Normal Normal    Bilirubin Negative Negative    Blood, UA Negative Negative   POCT pregnancy, urine   Result Value Ref Range    HCG, Urine, QL Negative Negative    Lot Number [id number]     Internal Positive Control Positive     Internal Negative Control Negative         Assessment and Plan  Kelly was seen today for mass.    Diagnoses and all orders for this visit:    Encounter for surveillance of contraceptive  pills  - Amenorrhea likely due to continuous OCP. Will have her switch to cyclic OCP when current pack runs out. If issue persists she is to notify office for further workup.    Dermatitis  - Appears to be bug bite, possibly fleas from her job as . No cellulitis or abscess on exam. Will send topical steroid to pharmacy for symptomatic relief.  - Dermatology referral offered but deferred.    Health Maintenance  - Pap smear: She reports last pap smear 2018 at [email or web address]. Results normal.   - Mammogram: Start screening at age 40.  - Colonoscopy: Start screening at age 45-50.  - Immunizations: She believes Tdap is UTD.  - HCV: with next labs.  - Depression screening: negative 3/2020       Return in about 8 months (around 3/2/2021) for as already scheduled.     Answers for HPI/ROS submitted by the patient on 6/13/2020   What is the primary reason for your visit?: Other  Please describe your symptoms.: Red bump in belly button that is itchy/painful with discharge coming out of it. Not clearing up with NeoSporin  Have you had these symptoms before?: No  How long have you been having these symptoms?: 1-4 days

## 2020-07-18 ENCOUNTER — E-VISIT (OUTPATIENT)
Dept: INTERNAL MEDICINE | Facility: CLINIC | Age: 25
End: 2020-07-18

## 2020-07-20 NOTE — PROGRESS NOTES
Patient with urinary and vaginal symptoms and recent chlamydia infection. Advised to schedule in office evaluation or be seen in urgent care. Do not bill for this encounter.

## 2020-07-21 ENCOUNTER — OFFICE VISIT (OUTPATIENT)
Dept: INTERNAL MEDICINE | Facility: CLINIC | Age: 25
End: 2020-07-21

## 2020-07-21 ENCOUNTER — LAB (OUTPATIENT)
Dept: LAB | Facility: HOSPITAL | Age: 25
End: 2020-07-21

## 2020-07-21 VITALS
BODY MASS INDEX: 17.46 KG/M2 | SYSTOLIC BLOOD PRESSURE: 118 MMHG | HEIGHT: 68 IN | TEMPERATURE: 98 F | OXYGEN SATURATION: 98 % | HEART RATE: 59 BPM | RESPIRATION RATE: 16 BRPM | DIASTOLIC BLOOD PRESSURE: 74 MMHG | WEIGHT: 115.2 LBS

## 2020-07-21 DIAGNOSIS — N91.2 AMENORRHEA: ICD-10-CM

## 2020-07-21 DIAGNOSIS — M25.562 CHRONIC PAIN OF BOTH KNEES: ICD-10-CM

## 2020-07-21 DIAGNOSIS — G89.29 CHRONIC PAIN OF BOTH KNEES: ICD-10-CM

## 2020-07-21 DIAGNOSIS — R10.9 FLANK PAIN: ICD-10-CM

## 2020-07-21 DIAGNOSIS — M25.561 CHRONIC PAIN OF BOTH KNEES: ICD-10-CM

## 2020-07-21 DIAGNOSIS — N10 ACUTE PYELONEPHRITIS: Primary | ICD-10-CM

## 2020-07-21 DIAGNOSIS — N10 ACUTE PYELONEPHRITIS: ICD-10-CM

## 2020-07-21 LAB
ANION GAP SERPL CALCULATED.3IONS-SCNC: 11.1 MMOL/L (ref 5–15)
B-HCG UR QL: NEGATIVE
BILIRUB BLD-MCNC: NEGATIVE MG/DL
BUN SERPL-MCNC: 9 MG/DL (ref 6–20)
BUN/CREAT SERPL: 10.7 (ref 7–25)
CALCIUM SPEC-SCNC: 9.8 MG/DL (ref 8.6–10.5)
CHLORIDE SERPL-SCNC: 103 MMOL/L (ref 98–107)
CLARITY, POC: ABNORMAL
CO2 SERPL-SCNC: 25.9 MMOL/L (ref 22–29)
COLOR UR: YELLOW
CREAT SERPL-MCNC: 0.84 MG/DL (ref 0.57–1)
GFR SERPL CREATININE-BSD FRML MDRD: 83 ML/MIN/1.73
GLUCOSE SERPL-MCNC: 74 MG/DL (ref 65–99)
GLUCOSE UR STRIP-MCNC: NEGATIVE MG/DL
INTERNAL NEGATIVE CONTROL: NEGATIVE
INTERNAL POSITIVE CONTROL: POSITIVE
KETONES UR QL: NEGATIVE
LEUKOCYTE EST, POC: ABNORMAL
Lab: NORMAL
NITRITE UR-MCNC: NEGATIVE MG/ML
PH UR: 6 [PH] (ref 5–8)
POTASSIUM SERPL-SCNC: 4.6 MMOL/L (ref 3.5–5.2)
PROT UR STRIP-MCNC: NEGATIVE MG/DL
RBC # UR STRIP: NEGATIVE /UL
SODIUM SERPL-SCNC: 140 MMOL/L (ref 136–145)
SP GR UR: 1.02 (ref 1–1.03)
UROBILINOGEN UR QL: NORMAL

## 2020-07-21 PROCEDURE — 87491 CHLMYD TRACH DNA AMP PROBE: CPT | Performed by: INTERNAL MEDICINE

## 2020-07-21 PROCEDURE — 83001 ASSAY OF GONADOTROPIN (FSH): CPT | Performed by: INTERNAL MEDICINE

## 2020-07-21 PROCEDURE — 80048 BASIC METABOLIC PNL TOTAL CA: CPT | Performed by: INTERNAL MEDICINE

## 2020-07-21 PROCEDURE — 87591 N.GONORRHOEAE DNA AMP PROB: CPT | Performed by: INTERNAL MEDICINE

## 2020-07-21 PROCEDURE — 99214 OFFICE O/P EST MOD 30 MIN: CPT | Performed by: INTERNAL MEDICINE

## 2020-07-21 PROCEDURE — 84443 ASSAY THYROID STIM HORMONE: CPT | Performed by: INTERNAL MEDICINE

## 2020-07-21 PROCEDURE — 83002 ASSAY OF GONADOTROPIN (LH): CPT | Performed by: INTERNAL MEDICINE

## 2020-07-21 PROCEDURE — 81003 URINALYSIS AUTO W/O SCOPE: CPT | Performed by: INTERNAL MEDICINE

## 2020-07-21 PROCEDURE — 81025 URINE PREGNANCY TEST: CPT | Performed by: INTERNAL MEDICINE

## 2020-07-21 PROCEDURE — 84146 ASSAY OF PROLACTIN: CPT | Performed by: INTERNAL MEDICINE

## 2020-07-21 PROCEDURE — 87086 URINE CULTURE/COLONY COUNT: CPT | Performed by: INTERNAL MEDICINE

## 2020-07-21 PROCEDURE — 87661 TRICHOMONAS VAGINALIS AMPLIF: CPT | Performed by: INTERNAL MEDICINE

## 2020-07-21 PROCEDURE — 36415 COLL VENOUS BLD VENIPUNCTURE: CPT

## 2020-07-21 RX ORDER — LEVOFLOXACIN 750 MG/1
750 TABLET ORAL DAILY
Qty: 5 TABLET | Refills: 0 | Status: SHIPPED | OUTPATIENT
Start: 2020-07-21 | End: 2020-07-24

## 2020-07-21 NOTE — PROGRESS NOTES
Internal Medicine Acute Visit    Chief Complaint   Patient presents with   • Urinary Tract Infection     flank pain, x 1 week        HPI  Ms. Humphrey comes in today for 1 week of urinary frequency, flank pain, foul smelling urine, mild dysuria in the morning. She has not had fever, N/V besides one episode of vomiting a couple weeks ago. She is having a white vaginal discharge. In March she was treated for chlamydia with azithromycin and abstained from sex for a week as instructed. Using condoms but not consistently. Currently on OCP and compliant. No menstruation since April. UPT at home negative. No galactorrhea, breast pain, vision change, headache.    Additionally notes pain on outside of knees but attributes this to physical requirements of her job. No redness or swelling of the knees. Has taken ibuprofen, tylenol on occasion with mild relief. Uses ice with some relief. No relief with icy hot.       Review of Systems  Review of Systems   Constitutional: Negative.    Eyes: Negative for blurred vision, double vision and visual disturbance.   Gastrointestinal: Negative for abdominal pain, nausea and vomiting (except for 1 episode several weeks ago).   Genitourinary: Positive for amenorrhea, dysuria, flank pain, frequency, menstrual problem and vaginal discharge. Negative for breast discharge, breast pain, decreased urine volume, difficulty urinating, genital sores, hematuria and urgency.   Musculoskeletal: Positive for arthralgias (knees). Negative for joint swelling.   Skin: Negative for color change.   Neurological: Negative for headache.        Medications  Current Outpatient Medications on File Prior to Visit   Medication Sig Dispense Refill   • Drospiren-Eth Estrad-Levomefol 3-0.02-0.451 MG tablet Take 1 tablet by mouth Daily. 28 tablet 5   • mupirocin (BACTROBAN) 2 % ointment Apply  topically to the appropriate area as directed 3 (Three) Times a Day. Left buttock 22 g 0   • triamcinolone (KENALOG) 0.1 % cream  "Apply  topically to the appropriate area as directed 2 (Two) Times a Day. 30 g 1     No current facility-administered medications on file prior to visit.         Allergies  Allergies   Allergen Reactions   • Augmentin [Amoxicillin-Pot Clavulanate] Hives       PMH  Past Medical History:   Diagnosis Date   • ADHD (attention deficit hyperactivity disorder)    • Anxiety        Objective  Visit Vitals  /74   Pulse 59   Temp 98 °F (36.7 °C)   Resp 16   Ht 173 cm (68.11\")   Wt 52.3 kg (115 lb 3.2 oz)   SpO2 98%   BMI 17.46 kg/m²        Physical Exam  Physical Exam   Constitutional: She is oriented to person, place, and time. She appears well-developed and well-nourished. No distress.   HENT:   Head: Normocephalic and atraumatic.   Right Ear: External ear normal.   Left Ear: External ear normal.   Eyes: Conjunctivae and EOM are normal.   Cardiovascular: Normal rate, regular rhythm and normal heart sounds.   Pulmonary/Chest: Effort normal and breath sounds normal. No respiratory distress.   Abdominal: Soft. Bowel sounds are normal. She exhibits no distension. There is no tenderness. There is CVA tenderness (mild, bilateral).   Musculoskeletal: She exhibits no edema.   No redness or swelling of knees, pain of lateral joint line bilaterally.   Neurological: She is alert and oriented to person, place, and time.   Skin: Skin is warm and dry.   Psychiatric: She has a normal mood and affect.   Nursing note and vitals reviewed.      Results  Results for orders placed or performed in visit on 07/21/20   POCT pregnancy, urine   Result Value Ref Range    HCG, Urine, QL Negative Negative    Lot Number [id number]     Internal Positive Control Positive     Internal Negative Control Negative    POC Urinalysis Dipstick, Automated   Result Value Ref Range    Color Yellow Yellow, Straw, Dark Yellow, Ana    Clarity, UA Cloudy (A) Clear    Specific Gravity  1.025 1.005 - 1.030    pH, Urine 6.0 5.0 - 8.0    Leukocytes 500 Alvaro/ul (A) " Negative    Nitrite, UA Negative Negative    Protein, POC Negative Negative mg/dL    Glucose, UA Negative Negative, 1000 mg/dL (3+) mg/dL    Ketones, UA Negative Negative    Urobilinogen, UA Normal Normal    Bilirubin Negative Negative    Blood, UA Negative Negative        Assessment and Plan  Kelly was seen today for urinary tract infection.    Diagnoses and all orders for this visit:    Acute pyelonephritis  - Flank pain, dysuria, urinary frequency with UA significant for leukocytes.  - Was treated with azithromycin in March for chlamydia. Did not receive treatment for gonorrhea at that time as testing was negative. Does have white vaginal discharge. No new sexual partners, inconsistent condom use with current partner.  - Will start levaquin 750mg daily x5d for possible mild pyelonephritis and send urine culture. However concern for STI, particularly recurrent chlamydia or gonorrhea. Will send urine for chlamydia, gonorrhea, trichomonas. She defers  exam today.    Flank pain  - Suspected to be pyelonephritis as noted above. Will obtain BMP to evaluate renal function as she is concerned with family history of renal disease.    Chronic pain of both knees  - Several months of pain in both knees laterally. No swelling or redness of either knee. Given chlamydia infection in March she is at risk for reactive arthritis. This is consistent with knee involvement however clinically inconsistent as it is symmetric. Regardless will refer to PT and presume will improve with time.    Amenorrhea  - Initially developed amenorrhea with use of continuous OCP. Has taken cyclic OCP for the past month with ongoing amenorrhea. Still could be effect of OCP however will send workup. No clinical signs of prolactinoma or hyperprolactonemia.   - UPT negative today.   - FSH, LH, TSH, prolactin ordered  - GYN referral placed    Health Maintenance  - Pap smear: She reports last pap smear 2018 at [facility]. Results normal.   -  Mammogram: Start screening at age 40.  - Colonoscopy: Start screening at age 45-50.  - Immunizations: She believes Tdap is UTD.  - HCV: with next labs.  - Depression screening: negative 3/2020    Return in about 32 weeks (around 3/2/2021) for as already scheduled or sooner for persistent symptoms, Labs today.

## 2020-07-22 ENCOUNTER — TREATMENT (OUTPATIENT)
Dept: PHYSICAL THERAPY | Facility: CLINIC | Age: 25
End: 2020-07-22

## 2020-07-22 DIAGNOSIS — M25.562 PAIN IN BOTH KNEES, UNSPECIFIED CHRONICITY: Primary | ICD-10-CM

## 2020-07-22 DIAGNOSIS — M25.561 PAIN IN BOTH KNEES, UNSPECIFIED CHRONICITY: Primary | ICD-10-CM

## 2020-07-22 LAB
FSH SERPL-ACNC: 3.28 MIU/ML
LH SERPL-ACNC: 4.02 MIU/ML
PROLACTIN SERPL-MCNC: 13.4 NG/ML (ref 4.79–23.3)
TSH SERPL DL<=0.05 MIU/L-ACNC: 1.92 UIU/ML (ref 0.27–4.2)

## 2020-07-22 PROCEDURE — 97162 PT EVAL MOD COMPLEX 30 MIN: CPT | Performed by: PHYSICAL THERAPIST

## 2020-07-22 PROCEDURE — 97140 MANUAL THERAPY 1/> REGIONS: CPT | Performed by: PHYSICAL THERAPIST

## 2020-07-22 PROCEDURE — 97110 THERAPEUTIC EXERCISES: CPT | Performed by: PHYSICAL THERAPIST

## 2020-07-22 NOTE — PROGRESS NOTES
Physical Therapy Initial Evaluation and Plan of Care    Subjective Evaluation    History of Present Illness  Mechanism of injury: Pt is a 25 year old female presenting with bilateral knee pain. She feels pain on the outside and below both knees. She states that no matter what position she is in it is painful. She states that the pain started in April. She has never had issues with her knees before. She states that her right knee is the most painful. Both of her knees pop a lot. She used to ride horses but does not anymore. She is unable to run due to the pain. NSAIDS do not help. She notes that sometimes she has swelling.      Patient Occupation: Vet tech Quality of life: excellent    Pain  Current pain ratin (Left = 5)  At worst pain ratin  Quality: burning and dull ache  Aggravating factors: squatting, prolonged positioning and stairs (Running)    Diagnostic Tests  No diagnostic tests performed    Patient Goals  Patient goals for therapy: decreased pain, independence with ADLs/IADLs, return to sport/leisure activities and increased strength        Objective          Tenderness   Left Knee   Tenderness in the inferior fat pad, ITB, lateral joint line, LCL (proximal) and patellar tendon.     Right Knee   Tenderness in the inferior fat pad, ITB, lateral joint line, LCL (proximal) and patellar tendon.     Active Range of Motion   Left Knee   Flexion: 147 degrees   Extension: 0 degrees with pain    Right Knee   Flexion: 148 degrees   Extension: 2 degrees with pain    Patellar Mobility   Left Knee Patellar tendons within functional limits include the medial and lateral. Hypomobile in the left superior and left inferior patellar tendon(s).     Right Knee Patellar tendons within functional limits include the medial, lateral, superior and inferior.     Strength/Myotome Testing     Left Hip   Planes of Motion   Flexion: 3+  Extension: 3+  Abduction: 3+    Right Hip   Planes of Motion   Flexion: 3+  Extension:  3+  Abduction: 4-    Left Knee   Flexion: 4  Extension: 4+    Right Knee   Flexion: 5  Extension: 5    Tests     Left Knee   Positive lateral Kenny, Thessaly's test at 5 degrees and valgus stress test at 30 degrees.   Negative varus stress test at 0 degrees.     Right Knee   Positive lateral Kenny, Thessaly's test at 5 degrees and valgus stress test at 30 degrees.   Negative varus stress test at 0 degrees.     Ambulation     Quality of Movement During Gait     Knee    Knee (Left): Positive valgus.   Knee (Right): Positive valgus.     Ankle    Ankle (Left): Positive pronated.   Ankle (Right): Positive pronated.     Functional Assessment   Squat   Pain, left tibial anterior translation beyond toes and right tibial anterior translation beyond toes.           Assessment & Plan     Assessment  Impairments: abnormal gait, activity intolerance, impaired physical strength, lacks appropriate home exercise program and pain with function  Assessment details: Pt is a 25 year old female presenting to the clinic with bilateral knee pain, worse on the right. She has poor glut strength, positive McMurrays and Thessaly's bilaterally, poor form with squatting, and tenderness of inferior fat pads bilaterally. Her impairments are limiting her ability to run, squat, and ascend/descend steps. Pt would benefit from skilled PT services in order to address these impairments so that she can reach her long term goals.  Prognosis: good  Functional Limitations: uncomfortable because of pain  Goals  Plan Goals: SHORT TERM GOALS:  2 weeks       1. Pt independent with HEP  2. Pt to demonstrate cherie hip strength 4/5 or greater to improve stability with ambulation  3. Pt to report being able to walk for 10 minutes without increasing pain in the bilateral knee    LONG TERM GOALS:   6 weeks  1. Pt to demonstrate ability to perform full functional squat with good form and control of the knees and without increasing pain  2. Pt to demonstrate cherie  hip strength to 4+/5 or greater to improve safety with ambulation on uneven surfaces  3. Pt to return to work full duty without increased pain in the bilateral knee   4. Pt to demonstrate ability to perform step up/down 8 inch step x10 safely and without pain in the bilateral knee     Plan  Therapy options: will be seen for skilled physical therapy services  Planned modality interventions: cryotherapy, electrical stimulation/Russian stimulation, TENS and high voltage pulsed current (pain management)  Planned therapy interventions: functional ROM exercises, joint mobilization, home exercise program, flexibility, body mechanics training, manual therapy, motor coordination training, neuromuscular re-education, soft tissue mobilization, strengthening, stretching, therapeutic activities and gait training  Duration in visits: 1  Duration in weeks: 6  Treatment plan discussed with: patient        Manual Therapy:    15     mins  36388;  Therapeutic Exercise:    10     mins  42045;     Neuromuscular Margarito:        mins  89463;    Therapeutic Activity:          mins  24748;     Gait Training:           mins  88969;     Ultrasound:          mins  24670;    Electrical Stimulation:         mins  35059 ( );  Dry Needling          mins self-pay    Timed Treatment:   25   mins   Total Treatment:     55   mins    PT SIGNATURE: Rossana Serrano PT   DATE TREATMENT INITIATED: 7/22/2020    Initial Certification  Certification Period: 10/20/2020  I certify that the therapy services are furnished while this patient is under my care.  The services outlined above are required by this patient, and will be reviewed every 90 days.     PHYSICIAN: Faye Gramajo MD      DATE:     Please sign and return via fax to [phone].. Thank you, [facility].

## 2020-07-23 LAB — BACTERIA SPEC AEROBE CULT: ABNORMAL

## 2020-07-24 DIAGNOSIS — A74.9 CHLAMYDIA: Primary | ICD-10-CM

## 2020-07-24 LAB
C TRACH RRNA SPEC DONR QL NAA+PROBE: POSITIVE
GNRH SERPL-MCNC: NEGATIVE
T VAGINALIS RRNA GENITAL QL PROBE: NEGATIVE

## 2020-07-24 RX ORDER — AZITHROMYCIN 500 MG/1
1000 TABLET, FILM COATED ORAL ONCE
Qty: 2 TABLET | Refills: 0 | Status: SHIPPED | OUTPATIENT
Start: 2020-07-24 | End: 2020-07-24

## 2020-07-28 ENCOUNTER — TREATMENT (OUTPATIENT)
Dept: PHYSICAL THERAPY | Facility: CLINIC | Age: 25
End: 2020-07-28

## 2020-07-28 DIAGNOSIS — M25.562 PAIN IN BOTH KNEES, UNSPECIFIED CHRONICITY: Primary | ICD-10-CM

## 2020-07-28 DIAGNOSIS — M25.561 PAIN IN BOTH KNEES, UNSPECIFIED CHRONICITY: Primary | ICD-10-CM

## 2020-07-28 PROCEDURE — 97110 THERAPEUTIC EXERCISES: CPT | Performed by: PHYSICAL THERAPIST

## 2020-07-28 PROCEDURE — 97530 THERAPEUTIC ACTIVITIES: CPT | Performed by: PHYSICAL THERAPIST

## 2020-07-28 PROCEDURE — 97112 NEUROMUSCULAR REEDUCATION: CPT | Performed by: PHYSICAL THERAPIST

## 2020-07-28 NOTE — PROGRESS NOTES
Physical Therapy Daily Progress Note    Subjective   Kelly Humphrey reports: that she feels about the same. She is not sure if she is doing her HEP correctly.      Objective   See Exercise, Manual, and Modality Logs for complete treatment.       Assessment/Plan  Pt tolerated treatment well. She was instructed on the benefits of foam rolling her ITB. Pt would benefit from continued skilled PT.    Progress per Plan of Care           Manual Therapy:         mins  23265;  Therapeutic Exercise:    25     mins  44881;     Neuromuscular Margarito:    10    mins  64066;    Therapeutic Activity:     15     mins  34312;     Gait Training:           mins  07929;     Ultrasound:          mins  50195;    Electrical Stimulation:         mins  83476 ( );  E-Stim Attended:         mins  51125  Iontophoresis          mins 13482   Traction          mins  61334  Fluidotherapy          mins  55616  Dry Needling          mins self-pay - No Charge  Paraffin          mins  20940    Timed Treatment:   50   mins   Total Treatment:     50   mins    Rossana Serrano, PT, DPT  Physical Therapist

## 2020-08-04 ENCOUNTER — TREATMENT (OUTPATIENT)
Dept: PHYSICAL THERAPY | Facility: CLINIC | Age: 25
End: 2020-08-04

## 2020-08-04 DIAGNOSIS — M25.562 PAIN IN BOTH KNEES, UNSPECIFIED CHRONICITY: Primary | ICD-10-CM

## 2020-08-04 DIAGNOSIS — M25.561 PAIN IN BOTH KNEES, UNSPECIFIED CHRONICITY: Primary | ICD-10-CM

## 2020-08-04 PROCEDURE — 97530 THERAPEUTIC ACTIVITIES: CPT | Performed by: PHYSICAL THERAPIST

## 2020-08-04 PROCEDURE — 97140 MANUAL THERAPY 1/> REGIONS: CPT | Performed by: PHYSICAL THERAPIST

## 2020-08-04 PROCEDURE — 97110 THERAPEUTIC EXERCISES: CPT | Performed by: PHYSICAL THERAPIST

## 2020-08-04 NOTE — PROGRESS NOTES
Physical Therapy Daily Progress Note    Subjective   Kelly Humphrey reports: that she feels about the same. She has not noticed a difference in her symptoms.      Objective   See Exercise, Manual, and Modality Logs for complete treatment.       Assessment/Plan  Pt tolerated treatment well. She required cues to correct valgus with front step downs. Pt would benefit from continued skilled PT.    Progress per Plan of Care           Manual Therapy:    12     mins  78109;  Therapeutic Exercise:    25     mins  10034;     Neuromuscular Margarito:        mins  57800;    Therapeutic Activity:     15     mins  12438;     Gait Training:           mins  67009;     Ultrasound:          mins  92152;    Electrical Stimulation:         mins  87624 ( );  E-Stim Attended:         mins  71161  Iontophoresis          mins 57300   Traction          mins  18683  Fluidotherapy          mins  47317  Dry Needling          mins self-pay - No Charge  Paraffin          mins  92858    Timed Treatment:   52   mins   Total Treatment:     52   mins    Rossana Serrano, PT, DPT  Physical Therapist

## 2020-08-25 ENCOUNTER — TREATMENT (OUTPATIENT)
Dept: PHYSICAL THERAPY | Facility: CLINIC | Age: 25
End: 2020-08-25

## 2020-08-25 DIAGNOSIS — M25.562 PAIN IN BOTH KNEES, UNSPECIFIED CHRONICITY: Primary | ICD-10-CM

## 2020-08-25 DIAGNOSIS — M25.561 PAIN IN BOTH KNEES, UNSPECIFIED CHRONICITY: Primary | ICD-10-CM

## 2020-08-25 PROCEDURE — 97140 MANUAL THERAPY 1/> REGIONS: CPT | Performed by: PHYSICAL THERAPIST

## 2020-08-25 PROCEDURE — 97110 THERAPEUTIC EXERCISES: CPT | Performed by: PHYSICAL THERAPIST

## 2020-08-25 PROCEDURE — 97530 THERAPEUTIC ACTIVITIES: CPT | Performed by: PHYSICAL THERAPIST

## 2020-08-25 NOTE — PROGRESS NOTES
"   Physical Therapy Daily Progress Note    Subjective   Kelly Kam reports: that the \"scraping thing\" I did last visit really helped.      Objective   See Exercise, Manual, and Modality Logs for complete treatment.       Assessment/Plan  Pt tolerated treatment well. She was able to perform more advanced therex today. She required cues to not allow valgus with walking lunges. Pt would benefit from continued skilled PT.    Progress per Plan of Care           Manual Therapy:    24     mins  85510;  Therapeutic Exercise:    15     mins  10023;     Neuromuscular Margarito:        mins  54396;    Therapeutic Activity:     18     mins  54728;     Gait Training:           mins  04070;     Ultrasound:          mins  06735;    Electrical Stimulation:         mins  32931 ( );  E-Stim Attended:         mins  44457  Iontophoresis          mins 95125   Traction          mins  57483  Fluidotherapy          mins  56553  Dry Needling          mins self-pay - No Charge  Paraffin          mins  70919    Timed Treatment:   57   mins   Total Treatment:     57   mins    Rossana Serrano, PT, DPT  Physical Therapist  "

## 2020-09-09 ENCOUNTER — TREATMENT (OUTPATIENT)
Dept: PHYSICAL THERAPY | Facility: CLINIC | Age: 25
End: 2020-09-09

## 2020-09-09 DIAGNOSIS — M25.562 PAIN IN BOTH KNEES, UNSPECIFIED CHRONICITY: Primary | ICD-10-CM

## 2020-09-09 DIAGNOSIS — M25.561 PAIN IN BOTH KNEES, UNSPECIFIED CHRONICITY: Primary | ICD-10-CM

## 2020-09-09 PROCEDURE — 97110 THERAPEUTIC EXERCISES: CPT | Performed by: PHYSICAL THERAPIST

## 2020-09-09 PROCEDURE — 97140 MANUAL THERAPY 1/> REGIONS: CPT | Performed by: PHYSICAL THERAPIST

## 2020-09-09 PROCEDURE — 97530 THERAPEUTIC ACTIVITIES: CPT | Performed by: PHYSICAL THERAPIST

## 2020-09-15 ENCOUNTER — TELEPHONE (OUTPATIENT)
Dept: PHYSICAL THERAPY | Facility: CLINIC | Age: 25
End: 2020-09-15

## 2020-09-22 ENCOUNTER — TREATMENT (OUTPATIENT)
Dept: PHYSICAL THERAPY | Facility: CLINIC | Age: 25
End: 2020-09-22

## 2020-09-22 DIAGNOSIS — M25.561 PAIN IN BOTH KNEES, UNSPECIFIED CHRONICITY: Primary | ICD-10-CM

## 2020-09-22 DIAGNOSIS — M25.562 PAIN IN BOTH KNEES, UNSPECIFIED CHRONICITY: Primary | ICD-10-CM

## 2020-09-22 PROCEDURE — 97530 THERAPEUTIC ACTIVITIES: CPT | Performed by: PHYSICAL THERAPIST

## 2020-09-22 PROCEDURE — 97110 THERAPEUTIC EXERCISES: CPT | Performed by: PHYSICAL THERAPIST

## 2020-09-22 PROCEDURE — 97140 MANUAL THERAPY 1/> REGIONS: CPT | Performed by: PHYSICAL THERAPIST

## 2020-09-22 NOTE — PROGRESS NOTES
Physical Therapy Daily Progress Note    Subjective   Kelly Humphrey reports: she was doing fine until a doberman ran into her right leg. She is bruised and is having some pain today.      Objective   See Exercise, Manual, and Modality Logs for complete treatment.       Assessment/Plan  Pt tolerated treatment well. She reported a decrease in pressure in her left knee after deep patellar tendon STM. Pt would benefit from continued skilled PT.    Progress per Plan of Care           Manual Therapy:    8     mins  86833;  Therapeutic Exercise:    25     mins  30461;     Neuromuscular Margarito:        mins  78394;    Therapeutic Activity:     15     mins  91646;     Gait Training:           mins  27611;     Ultrasound:          mins  98814;    Electrical Stimulation:         mins  51273 ( );  E-Stim Attended:         mins  93299  Iontophoresis          mins 60954   Traction          mins  81924  Fluidotherapy          mins  15703  Dry Needling          mins self-pay - No Charge  Paraffin          mins  96470    Timed Treatment:   48   mins   Total Treatment:     48   mins    Rossana Serrano, PT, DPT  Physical Therapist

## 2020-10-06 ENCOUNTER — TELEPHONE (OUTPATIENT)
Dept: PHYSICAL THERAPY | Facility: CLINIC | Age: 25
End: 2020-10-06

## 2020-10-14 ENCOUNTER — TREATMENT (OUTPATIENT)
Dept: PHYSICAL THERAPY | Facility: CLINIC | Age: 25
End: 2020-10-14

## 2020-10-14 DIAGNOSIS — M25.561 PAIN IN BOTH KNEES, UNSPECIFIED CHRONICITY: Primary | ICD-10-CM

## 2020-10-14 DIAGNOSIS — M25.562 PAIN IN BOTH KNEES, UNSPECIFIED CHRONICITY: Primary | ICD-10-CM

## 2020-10-14 PROCEDURE — 97112 NEUROMUSCULAR REEDUCATION: CPT | Performed by: PHYSICAL THERAPIST

## 2020-10-14 PROCEDURE — 97110 THERAPEUTIC EXERCISES: CPT | Performed by: PHYSICAL THERAPIST

## 2020-10-14 PROCEDURE — 97140 MANUAL THERAPY 1/> REGIONS: CPT | Performed by: PHYSICAL THERAPIST

## 2020-10-14 NOTE — PROGRESS NOTES
Physical Therapy Daily Progress Note    Subjective   Kelly Humphrey reports: that the outsides of her knees still hurt every other day. The pain will switch from one side to the other at times.      Objective          Active Range of Motion     Lumbar   Flexion: WFL and with pain  Extension: WFL  Left lateral flexion: WFL and with pain  Right lateral flexion: WFL and with pain    Tests     Lumbar     Left   Positive passive SLR.     Right   Positive passive SLR.       See Exercise, Manual, and Modality Logs for complete treatment.       Assessment/Plan   Pt did not have a decrease in symptoms post lumbar PA's, however she did respond well to sciatic nerve glides. Pt was instructed on an HEP to include some exercises for the lumbar spine. She was also educated on use of foam roller for ITB. Pt would benefit from continued skilled PT.    Progress per Plan of Care           Manual Therapy:    24     mins  36479;  Therapeutic Exercise:    10     mins  02089;     Neuromuscular Margarito:    8    mins  28937;    Therapeutic Activity:          mins  58696;     Gait Training:           mins  13423;     Ultrasound:          mins  22793;    Electrical Stimulation:         mins  92252 ( );  E-Stim Attended:         mins  92049  Iontophoresis          mins 86397   Traction          mins  40074  Fluidotherapy          mins  03212  Dry Needling          mins self-pay - No Charge  Paraffin          mins  07966    Timed Treatment:   42   mins   Total Treatment:     42   mins    Rossana Serrano PT, DPT  Physical Therapist

## 2020-10-28 ENCOUNTER — TREATMENT (OUTPATIENT)
Dept: PHYSICAL THERAPY | Facility: CLINIC | Age: 25
End: 2020-10-28

## 2020-10-28 DIAGNOSIS — M25.561 PAIN IN BOTH KNEES, UNSPECIFIED CHRONICITY: Primary | ICD-10-CM

## 2020-10-28 DIAGNOSIS — M25.562 PAIN IN BOTH KNEES, UNSPECIFIED CHRONICITY: Primary | ICD-10-CM

## 2020-10-28 PROCEDURE — 97140 MANUAL THERAPY 1/> REGIONS: CPT | Performed by: PHYSICAL THERAPIST

## 2020-10-28 PROCEDURE — 97110 THERAPEUTIC EXERCISES: CPT | Performed by: PHYSICAL THERAPIST

## 2020-10-28 PROCEDURE — 97530 THERAPEUTIC ACTIVITIES: CPT | Performed by: PHYSICAL THERAPIST

## 2020-10-28 NOTE — PROGRESS NOTES
Physical Therapy Daily Progress Note    Subjective   Kelly Humphrey reports: her knees are feeling fine but the left side of her low back is hurting.      Objective   See Exercise, Manual, and Modality Logs for complete treatment.       Assessment/Plan  Pt tolerated treatment well. She required cues to keep her low back pressed down with dead bugs. Pt would benefit from continued skilled PT.    Progress per Plan of Care           Manual Therapy:    10     mins  15922;  Therapeutic Exercise:    10     mins  47157;     Neuromuscular Margarito:        mins  20312;    Therapeutic Activity:     10     mins  41371;     Gait Training:           mins  69927;     Ultrasound:          mins  94107;    Electrical Stimulation:         mins  77320 ( );  E-Stim Attended:         mins  05654  Iontophoresis          mins 34523   Traction          mins  47712  Fluidotherapy          mins  02023  Dry Needling          mins self-pay - No Charge  Paraffin          mins  26479    Timed Treatment:   30   mins   Total Treatment:     52   mins    Rossana Serrano, PT, DPT  Physical Therapist

## 2020-12-02 DIAGNOSIS — Z30.41 ENCOUNTER FOR SURVEILLANCE OF CONTRACEPTIVE PILLS: ICD-10-CM

## 2020-12-02 RX ORDER — DROSPIRENONE, ETHINYL ESTRADIOL AND LEVOMEFOLATE CALCIUM AND LEVOMEFOLATE CALCIUM 3-0.02(24)
1 KIT ORAL DAILY
Qty: 28 TABLET | Refills: 5 | Status: SHIPPED | OUTPATIENT
Start: 2020-12-02 | End: 2021-05-27

## 2020-12-02 NOTE — TELEPHONE ENCOUNTER
Birth control will be refilled. But please call patient and remind her of the GYN referral that was placed previously. I would like for her to schedule that appointment. She should have received a letter with information to schedule.

## 2020-12-08 ENCOUNTER — TELEPHONE (OUTPATIENT)
Dept: URGENT CARE | Facility: CLINIC | Age: 25
End: 2020-12-08

## 2020-12-08 NOTE — TELEPHONE ENCOUNTER
Pt called stating she has not been able to get her prescription and was advised call back if that was to happen.+

## 2020-12-15 DIAGNOSIS — B35.4 TINEA CORPORIS: Primary | ICD-10-CM

## 2020-12-15 RX ORDER — CLOTRIMAZOLE 1 %
CREAM (GRAM) TOPICAL 2 TIMES DAILY
Qty: 60 G | Refills: 0 | Status: SHIPPED | OUTPATIENT
Start: 2020-12-15 | End: 2021-02-15

## 2021-02-08 ENCOUNTER — HOSPITAL ENCOUNTER (EMERGENCY)
Facility: HOSPITAL | Age: 26
Discharge: HOME | End: 2021-02-08
Attending: EMERGENCY MEDICINE | Admitting: EMERGENCY MEDICINE

## 2021-02-08 VITALS
SYSTOLIC BLOOD PRESSURE: 136 MMHG | DIASTOLIC BLOOD PRESSURE: 85 MMHG | OXYGEN SATURATION: 99 % | WEIGHT: 110 LBS | HEART RATE: 66 BPM | RESPIRATION RATE: 14 BRPM | HEIGHT: 68 IN | BODY MASS INDEX: 16.67 KG/M2 | TEMPERATURE: 98.9 F

## 2021-02-08 DIAGNOSIS — S01.511A LACERATION OF LOWER LIP, INITIAL ENCOUNTER: Primary | ICD-10-CM

## 2021-02-08 PROCEDURE — 99283 EMERGENCY DEPT VISIT LOW MDM: CPT

## 2021-02-08 PROCEDURE — 99282 EMERGENCY DEPT VISIT SF MDM: CPT

## 2021-02-08 RX ORDER — LIDOCAINE HYDROCHLORIDE 10 MG/ML
10 INJECTION, SOLUTION EPIDURAL; INFILTRATION; INTRACAUDAL; PERINEURAL ONCE
Status: COMPLETED | OUTPATIENT
Start: 2021-02-08 | End: 2021-02-08

## 2021-02-08 RX ADMIN — Medication 3 ML: at 11:41

## 2021-02-08 RX ADMIN — LIDOCAINE HYDROCHLORIDE 10 ML: 10 INJECTION, SOLUTION EPIDURAL; INFILTRATION; INTRACAUDAL; PERINEURAL at 11:41

## 2021-02-08 NOTE — ED PROVIDER NOTES
Subjective   Ms. melton is a 25-year-old female that works at a [employer] states she was head butted by a dog and she ran her tooth through her lower lip.  She reports that her immunizations are up-to-date.  She reports that she did not get bit she did not get scratched.  She states that she little bit of bleeding but other than that she is mainly here to get this repaired.  She did not have any dental trauma associated with this she has no jaw pain.  No neck pain and did not lose consciousness.      History provided by:  Patient   used: No    Lip Laceration  Location: Lower lip below vermilion border.  Length:  2  Depth:  Through underlying tissue  Quality: straight    Bleeding: venous    Time since incident:  1 hour  Injury mechanism: Head butted by dog her own tooth is what went through her lip.  Pain details:     Quality:  Burning    Severity:  Mild    Timing:  Constant  Foreign body present:  No foreign bodies  Relieved by:  Nothing  Worsened by:  Pressure  Ineffective treatments:  None tried  Tetanus status:  Up to date  Associated symptoms: no fever, no focal weakness, no numbness, no rash, no redness, no swelling and no streaking        Review of Systems   Constitutional: Negative for chills and fever.   Respiratory: Negative for chest tightness and shortness of breath.    Cardiovascular: Negative for chest pain.   Skin: Negative for rash.   Neurological: Negative for focal weakness.   All other systems reviewed and are negative.      Past Medical History:   Diagnosis Date   • ADHD (attention deficit hyperactivity disorder)    • Anxiety        Allergies   Allergen Reactions   • Augmentin [Amoxicillin-Pot Clavulanate] Hives       Past Surgical History:   Procedure Laterality Date   • TONSILLECTOMY  2017       Family History   Adopted: Yes   Problem Relation Age of Onset   • Kidney disease Other        Social History     Socioeconomic History   • Marital status: Single     Spouse name: Not  on file   • Number of children: Not on file   • Years of education: Not on file   • Highest education level: Not on file   Tobacco Use   • Smoking status: Never Smoker   • Smokeless tobacco: Never Used   Substance and Sexual Activity   • Alcohol use: Yes     Comment: occasional beer, once every 2 weeks   • Drug use: No   • Sexual activity: Yes     Partners: Male     Birth control/protection: OCP, Condom     Comment: single, steady partner           Objective   Physical Exam  Vitals signs and nursing note reviewed.   Constitutional:       General: She is not in acute distress.     Appearance: She is well-developed. She is not diaphoretic.   HENT:      Head: Atraumatic.      Nose: Nose normal.   Eyes:      General: No scleral icterus.     Conjunctiva/sclera: Conjunctivae normal.   Neck:      Musculoskeletal: Normal range of motion and neck supple.   Cardiovascular:      Rate and Rhythm: Normal rate and regular rhythm.      Heart sounds: Normal heart sounds. No murmur.   Pulmonary:      Effort: Pulmonary effort is normal. No respiratory distress.      Breath sounds: Normal breath sounds.   Abdominal:      General: Bowel sounds are normal.      Palpations: Abdomen is soft.      Tenderness: There is no abdominal tenderness.   Musculoskeletal: Normal range of motion.   Skin:     General: Skin is warm and dry.      Comments: Lower lip is a 2 cm through and through laceration.  The inner portion is about 1 cm and has a little bit adipose tissue exposed from it.  The outer is about 2 cm as well its linear and below the vermilion border and just a very small amount of oozing bleeding.  There is no dental trauma.  No malocclusion.   Neurological:      Mental Status: She is alert and oriented to person, place, and time.   Psychiatric:         Behavior: Behavior normal.         Laceration Repair    Date/Time: 2/8/2021 12:43 PM  Performed by: Ricardo Hall PA  Authorized by: Scott Waterman DO     Consent:     Consent  obtained:  Verbal    Consent given by:  Patient    Risks discussed:  Retained foreign body, tendon damage, poor cosmetic result, pain, infection and need for additional repair    Alternatives discussed:  No treatment and referral  Anesthesia (see MAR for exact dosages):     Anesthesia method:  Local infiltration and topical application    Topical anesthetic:  LET    Local anesthetic:  Lidocaine 1% w/o epi  Laceration details:     Location:  Lip    Lip location:  Lower lip, full thickness (Outer lip was treated with left inner lip with 1% lidocaine)    Vermilion border involved: no      Length (cm):  2    Depth (mm):  10  Repair type:     Repair type:  Intermediate  Pre-procedure details:     Preparation:  Patient was prepped and draped in usual sterile fashion and imaging obtained to evaluate for foreign bodies  Exploration:     Hemostasis achieved with:  LET and direct pressure    Wound exploration: wound explored through full range of motion and entire depth of wound probed and visualized      Wound extent: no fascia violation noted, no foreign bodies/material noted, no muscle damage noted, no underlying fracture noted and no vascular damage noted      Contaminated: no    Treatment:     Area cleansed with:  Betadine and saline    Amount of cleaning:  Standard    Irrigation solution:  Sterile saline    Irrigation method:  Syringe  Skin repair:     Repair method:  Sutures    Suture size:  6-0    Suture material:  Nylon    Suture technique:  Simple interrupted    Number of sutures:  3  Approximation:     Approximation:  Close    Vermilion border: poorly aligned    Post-procedure details:     Dressing:  Open (no dressing)    Patient tolerance of procedure:  Tolerated well, no immediate complications  Laceration Repair    Date/Time: 2/8/2021 12:47 PM  Performed by: Ricardo Hall PA  Authorized by: Scott Waterman DO     Consent:     Consent obtained:  Verbal    Consent given by:  Patient    Risks discussed:   Infection, pain, retained foreign body, poor cosmetic result, poor wound healing, nerve damage, need for additional repair, tendon damage and vascular damage    Alternatives discussed:  No treatment and referral  Anesthesia (see MAR for exact dosages):     Anesthesia method:  Local infiltration    Local anesthetic:  Lidocaine 1% w/o epi  Laceration details:     Location:  Lip    Lip location:  Lower lip, full thickness    Vermilion border involved: no      Length (cm):  2  Repair type:     Repair type:  Simple  Pre-procedure details:     Preparation:  Patient was prepped and draped in usual sterile fashion  Exploration:     Hemostasis achieved with:  Direct pressure    Wound exploration: wound explored through full range of motion      Wound extent: no foreign bodies/material noted, no muscle damage noted, no tendon damage noted, no underlying fracture noted and no vascular damage noted      Contaminated: no    Treatment:     Area cleansed with:  Betadine    Amount of cleaning:  Standard    Irrigation solution:  Sterile saline    Irrigation method:  Syringe  Skin repair:     Repair method:  Sutures    Suture size:  5-0    Suture material:  Chromic gut    Number of sutures:  1  Approximation:     Approximation:  Loose    Vermilion border: well-aligned                 ED Course                                           MDM  Number of Diagnoses or Management Options  Laceration of lower lip, initial encounter: new and requires workup     Amount and/or Complexity of Data Reviewed  Discuss the patient with other providers: yes    Patient Progress  Patient progress: stable      Final diagnoses:   Laceration of lower lip, initial encounter            Ricardo Hall PA  02/09/21 [id number]

## 2021-02-10 ENCOUNTER — APPOINTMENT (OUTPATIENT)
Dept: CT IMAGING | Facility: HOSPITAL | Age: 26
End: 2021-02-10

## 2021-02-10 ENCOUNTER — HOSPITAL ENCOUNTER (EMERGENCY)
Facility: HOSPITAL | Age: 26
Discharge: HOME | End: 2021-02-10
Attending: EMERGENCY MEDICINE | Admitting: EMERGENCY MEDICINE

## 2021-02-10 VITALS
DIASTOLIC BLOOD PRESSURE: 62 MMHG | HEART RATE: 56 BPM | TEMPERATURE: 98.6 F | OXYGEN SATURATION: 100 % | HEIGHT: 68 IN | WEIGHT: 110 LBS | BODY MASS INDEX: 16.67 KG/M2 | SYSTOLIC BLOOD PRESSURE: 105 MMHG | RESPIRATION RATE: 20 BRPM

## 2021-02-10 DIAGNOSIS — R42 DIZZINESS: ICD-10-CM

## 2021-02-10 DIAGNOSIS — S09.90XA CLOSED HEAD INJURY, INITIAL ENCOUNTER: Primary | ICD-10-CM

## 2021-02-10 DIAGNOSIS — E86.0 DEHYDRATION: ICD-10-CM

## 2021-02-10 DIAGNOSIS — R00.1 BRADYCARDIA: ICD-10-CM

## 2021-02-10 DIAGNOSIS — S01.511D LIP LACERATION, SUBSEQUENT ENCOUNTER: ICD-10-CM

## 2021-02-10 LAB
ALBUMIN SERPL-MCNC: 4.4 G/DL (ref 3.5–5.2)
ALBUMIN/GLOB SERPL: 1.5 G/DL
ALP SERPL-CCNC: 34 U/L (ref 39–117)
ALT SERPL W P-5'-P-CCNC: 9 U/L (ref 1–33)
ANION GAP SERPL CALCULATED.3IONS-SCNC: 7 MMOL/L (ref 5–15)
AST SERPL-CCNC: 22 U/L (ref 1–32)
B-HCG UR QL: NEGATIVE
BACTERIA UR QL AUTO: ABNORMAL /HPF
BASOPHILS # BLD AUTO: 0.05 10*3/MM3 (ref 0–0.2)
BASOPHILS NFR BLD AUTO: 1.1 % (ref 0–1.5)
BILIRUB SERPL-MCNC: 1 MG/DL (ref 0–1.2)
BILIRUB UR QL STRIP: NEGATIVE
BUN SERPL-MCNC: 11 MG/DL (ref 6–20)
BUN/CREAT SERPL: 12.9 (ref 7–25)
CALCIUM SPEC-SCNC: 9.3 MG/DL (ref 8.6–10.5)
CHLORIDE SERPL-SCNC: 105 MMOL/L (ref 98–107)
CLARITY UR: ABNORMAL
CO2 SERPL-SCNC: 28 MMOL/L (ref 22–29)
COD CRY URNS QL: ABNORMAL /HPF
COLOR UR: ABNORMAL
CREAT SERPL-MCNC: 0.85 MG/DL (ref 0.57–1)
DEPRECATED RDW RBC AUTO: 42.5 FL (ref 37–54)
EOSINOPHIL # BLD AUTO: 0.17 10*3/MM3 (ref 0–0.4)
EOSINOPHIL NFR BLD AUTO: 3.6 % (ref 0.3–6.2)
ERYTHROCYTE [DISTWIDTH] IN BLOOD BY AUTOMATED COUNT: 12.9 % (ref 12.3–15.4)
GFR SERPL CREATININE-BSD FRML MDRD: 81 ML/MIN/1.73
GLOBULIN UR ELPH-MCNC: 3 GM/DL
GLUCOSE SERPL-MCNC: 96 MG/DL (ref 65–99)
GLUCOSE UR STRIP-MCNC: NEGATIVE MG/DL
HCT VFR BLD AUTO: 38.3 % (ref 34–46.6)
HGB BLD-MCNC: 12.6 G/DL (ref 12–15.9)
HGB UR QL STRIP.AUTO: NEGATIVE
HOLD SPECIMEN: NORMAL
HYALINE CASTS UR QL AUTO: ABNORMAL /LPF
IMM GRANULOCYTES # BLD AUTO: 0.01 10*3/MM3 (ref 0–0.05)
IMM GRANULOCYTES NFR BLD AUTO: 0.2 % (ref 0–0.5)
INTERNAL NEGATIVE CONTROL: NEGATIVE
INTERNAL POSITIVE CONTROL: POSITIVE
KETONES UR QL STRIP: ABNORMAL
LEUKOCYTE ESTERASE UR QL STRIP.AUTO: ABNORMAL
LIPASE SERPL-CCNC: 23 U/L (ref 13–60)
LYMPHOCYTES # BLD AUTO: 2.53 10*3/MM3 (ref 0.7–3.1)
LYMPHOCYTES NFR BLD AUTO: 53.2 % (ref 19.6–45.3)
Lab: NORMAL
MCH RBC QN AUTO: 29.9 PG (ref 26.6–33)
MCHC RBC AUTO-ENTMCNC: 32.9 G/DL (ref 31.5–35.7)
MCV RBC AUTO: 91 FL (ref 79–97)
MONOCYTES # BLD AUTO: 0.64 10*3/MM3 (ref 0.1–0.9)
MONOCYTES NFR BLD AUTO: 13.4 % (ref 5–12)
MUCOUS THREADS URNS QL MICRO: ABNORMAL /HPF
NEUTROPHILS NFR BLD AUTO: 1.36 10*3/MM3 (ref 1.7–7)
NEUTROPHILS NFR BLD AUTO: 28.5 % (ref 42.7–76)
NITRITE UR QL STRIP: NEGATIVE
NRBC BLD AUTO-RTO: 0 /100 WBC (ref 0–0.2)
PH UR STRIP.AUTO: 6 [PH] (ref 5–8)
PLATELET # BLD AUTO: 274 10*3/MM3 (ref 140–450)
PMV BLD AUTO: 11 FL (ref 6–12)
POTASSIUM SERPL-SCNC: 3.8 MMOL/L (ref 3.5–5.2)
PROT SERPL-MCNC: 7.4 G/DL (ref 6–8.5)
PROT UR QL STRIP: ABNORMAL
QT INTERVAL: 458 MS
QTC INTERVAL: 429 MS
RBC # BLD AUTO: 4.21 10*6/MM3 (ref 3.77–5.28)
RBC # UR: ABNORMAL /HPF
REF LAB TEST METHOD: ABNORMAL
SODIUM SERPL-SCNC: 140 MMOL/L (ref 136–145)
SP GR UR STRIP: 1.03 (ref 1–1.03)
SQUAMOUS #/AREA URNS HPF: ABNORMAL /HPF
UROBILINOGEN UR QL STRIP: ABNORMAL
WBC # BLD AUTO: 4.76 10*3/MM3 (ref 3.4–10.8)
WBC UR QL AUTO: ABNORMAL /HPF
WHOLE BLOOD HOLD SPECIMEN: NORMAL
WHOLE BLOOD HOLD SPECIMEN: NORMAL

## 2021-02-10 PROCEDURE — 80053 COMPREHEN METABOLIC PANEL: CPT | Performed by: EMERGENCY MEDICINE

## 2021-02-10 PROCEDURE — 81025 URINE PREGNANCY TEST: CPT | Performed by: EMERGENCY MEDICINE

## 2021-02-10 PROCEDURE — 25010000002 ONDANSETRON PER 1 MG: Performed by: EMERGENCY MEDICINE

## 2021-02-10 PROCEDURE — 85025 COMPLETE CBC W/AUTO DIFF WBC: CPT | Performed by: EMERGENCY MEDICINE

## 2021-02-10 PROCEDURE — 96374 THER/PROPH/DIAG INJ IV PUSH: CPT

## 2021-02-10 PROCEDURE — 70450 CT HEAD/BRAIN W/O DYE: CPT

## 2021-02-10 PROCEDURE — 93005 ELECTROCARDIOGRAM TRACING: CPT | Performed by: EMERGENCY MEDICINE

## 2021-02-10 PROCEDURE — 81001 URINALYSIS AUTO W/SCOPE: CPT | Performed by: EMERGENCY MEDICINE

## 2021-02-10 PROCEDURE — 83690 ASSAY OF LIPASE: CPT | Performed by: EMERGENCY MEDICINE

## 2021-02-10 PROCEDURE — 99283 EMERGENCY DEPT VISIT LOW MDM: CPT

## 2021-02-10 PROCEDURE — 96361 HYDRATE IV INFUSION ADD-ON: CPT

## 2021-02-10 RX ORDER — SODIUM CHLORIDE 9 MG/ML
10 INJECTION INTRAVENOUS AS NEEDED
Status: DISCONTINUED | OUTPATIENT
Start: 2021-02-10 | End: 2021-02-10 | Stop reason: HOSPADM

## 2021-02-10 RX ORDER — ONDANSETRON 2 MG/ML
4 INJECTION INTRAMUSCULAR; INTRAVENOUS ONCE
Status: COMPLETED | OUTPATIENT
Start: 2021-02-10 | End: 2021-02-10

## 2021-02-10 RX ORDER — ONDANSETRON 4 MG/1
4 TABLET, FILM COATED ORAL EVERY 6 HOURS PRN
Qty: 15 TABLET | Refills: 0 | OUTPATIENT
Start: 2021-02-10 | End: 2021-04-09

## 2021-02-10 RX ADMIN — SODIUM CHLORIDE 1000 ML: 9 INJECTION, SOLUTION INTRAVENOUS at 11:01

## 2021-02-10 RX ADMIN — ONDANSETRON 4 MG: 2 INJECTION INTRAMUSCULAR; INTRAVENOUS at 11:01

## 2021-02-10 NOTE — DISCHARGE INSTRUCTIONS
Drink plenty of fluids as we discussed.    Take Zofran as prescribed.    It is important that you follow-up with your PCP this week as we discussed.    Sutures out on Saturday as already indicated.    Please review the medications you are supposed to be taking according to prior physician recommendations. I have not changed your home medications during this visit. If your discharge instructions indicate that I have changed your home medications, this is not the case, and you should disregard. If you have any questions about the medication you should be taking at home, please call your physician.

## 2021-02-10 NOTE — ED PROVIDER NOTES
"Subjective   This patient is a very sweet 25-year-old female who is here 2 days ago after an injury that took place at work.  Please refer to past medical documentation for details.  Summary statement is that the patient was struck in the head by a [employer] dog as she was working as a .  She tells me she was \"stunned\" and is unsure of whether or not she lost consciousness as result of the event.  She did suffer a lip laceration and had sutures placed here.  She comes in today stating that she has had some nausea vomiting and dizziness since that time.  She has not been able to keep anything down.  She also states that she has been very stressed over the last week even before the event but was not having the aforementioned symptoms until the last 2 days.  Denies any hemoptysis or hematemesis.  Denies any fevers or chills.  Reports no real headache.  Patient denies any chest pain or shortness of breath.  In summary, we have a 25-year-old female who suffered a head injury at work 2 days ago and comes in today for nausea vomiting and some dizziness over the last couple of days.  She has had no syncope or presyncope.  Denies any other associated symptoms.  Very specifically denies any palpitations, dysuria fevers chills chest pain.  Denies any history of DVT or PE.  Is in unaccompanied.  She is pleasant, articulate, and oriented x4.    Past medical history  ADHD, anxiety    Family history  Patient is adopted and therefore no family history known.          Review of Systems   Constitutional: Negative.  Negative for chills, fatigue, fever and unexpected weight change.   HENT: Negative for dental problem, ear pain, hearing loss and sinus pressure.    Eyes: Negative for pain and visual disturbance.   Respiratory: Negative for chest tightness and shortness of breath.    Cardiovascular: Negative for chest pain, palpitations and leg swelling.   Gastrointestinal: Positive for nausea and vomiting. Negative for " blood in stool and diarrhea.   Genitourinary: Negative for difficulty urinating, dysuria, frequency, hematuria and urgency.   Musculoskeletal: Negative for myalgias, neck pain and neck stiffness.   Neurological: Positive for dizziness and light-headedness. Negative for seizures, syncope, speech difficulty and headaches.   Psychiatric/Behavioral: Negative for confusion.   All other systems reviewed and are negative.      Past Medical History:   Diagnosis Date   • ADHD (attention deficit hyperactivity disorder)    • Anxiety        Allergies   Allergen Reactions   • Augmentin [Amoxicillin-Pot Clavulanate] Hives       Past Surgical History:   Procedure Laterality Date   • TONSILLECTOMY  2017       Family History   Adopted: Yes   Problem Relation Age of Onset   • Kidney disease Other        Social History     Socioeconomic History   • Marital status: Single     Spouse name: Not on file   • Number of children: Not on file   • Years of education: Not on file   • Highest education level: Not on file   Tobacco Use   • Smoking status: Never Smoker   • Smokeless tobacco: Never Used   Substance and Sexual Activity   • Alcohol use: Yes     Comment: occasional beer, once every 2 weeks   • Drug use: No   • Sexual activity: Yes     Partners: Male     Birth control/protection: OCP, Condom     Comment: single, steady partner           Objective   Physical Exam  Vitals signs and nursing note reviewed.   Constitutional:       General: She is not in acute distress.     Appearance: She is well-developed. She is not toxic-appearing.      Comments: Very thin appearing female.   HENT:      Head: Normocephalic and atraumatic.      Jaw: No trismus.      Right Ear: External ear normal.      Left Ear: External ear normal.      Nose: Nose normal.      Mouth/Throat:      Mouth: Mucous membranes are dry. No oral lesions.      Dentition: No dental abscesses.      Pharynx: Oropharynx is clear. No posterior oropharyngeal erythema or uvula swelling.       Tonsils: No tonsillar exudate or tonsillar abscesses.   Eyes:      Conjunctiva/sclera:      Right eye: Right conjunctiva is not injected.      Left eye: Left conjunctiva is not injected.      Pupils: Pupils are equal, round, and reactive to light.   Neck:      Musculoskeletal: Normal range of motion and neck supple. Normal range of motion. No neck rigidity.      Vascular: No JVD.      Trachea: No tracheal tenderness.   Cardiovascular:      Rate and Rhythm: Regular rhythm. Bradycardia present.      Heart sounds: Normal heart sounds. No friction rub. No gallop.       Comments: Rate in the 50s.  Pulmonary:      Effort: Pulmonary effort is normal.      Breath sounds: Normal breath sounds. No wheezing or rales.   Chest:      Chest wall: No tenderness.   Abdominal:      General: Bowel sounds are normal. There is no distension.      Palpations: Abdomen is soft. Abdomen is not rigid. There is no mass or pulsatile mass.      Tenderness: There is no abdominal tenderness. There is no guarding or rebound. Negative signs include McBurney's sign.      Comments: No signs of acute abdomen.  No pain at McBurney's point.  No pulsatile abdominal mass.   Musculoskeletal: Normal range of motion.         General: No tenderness or deformity.   Lymphadenopathy:      Cervical: No cervical adenopathy.   Skin:     General: Skin is warm and dry.      Capillary Refill: Capillary refill takes less than 2 seconds.      Findings: No erythema or rash.      Comments: No diaphoresis, lesions, nevi, petechia, purpura   Neurological:      Mental Status: She is alert and oriented to person, place, and time.      Cranial Nerves: No cranial nerve deficit.      Sensory: No sensory deficit.      Motor: No tremor or abnormal muscle tone.      Comments: 5/5 strength bilaterally with flexion and extension of fingers, wrist, elbows, knees and hips as well as plantar and dorsiflexion of the foot.   Psychiatric:         Attention and Perception: She is  attentive.         Speech: Speech normal.         Behavior: Behavior normal.         Thought Content: Thought content normal.         Judgment: Judgment normal.         Procedures           ED Course  ED Course as of Feb 10 1519   Wed Feb 10, 2021   1052 I had a nice conversation with Ms. Humphrey at the bedside.  I examined her suture placement from Monday and there does not appear to be any signs of secondary infection.  Sutures are holding wound margins well.  Patient understands when to get the sutures out as indicated by Magan Hall PA-C when they were placed.  Responded great deal time talked about postconcussive syndrome and the somewhat transient dizziness and complaints she has been having over the last couple of days.  Imaging was not completed when she was here last time and so we will add CT imaging of the head in addition to the lab studies obtained here.  I have added EKG, basic labs, CT of the head.  Patient resting comfortably at this time and will receive IV fluids as well as Zofran.  She is mentating well, oriented x4, without complaint.  I do anticipate ultimate discharge home.    [SARA]   1229 I reexamined the patient at approximate 12:15 PM Eastern time.  We talked about her CBC, CMP, lipase, urine pregnancy test, and urinalysis results.  Patient had 2+ bacteria but unfortunately had 13-20 squamous epithelial cells and 6-12 white blood cells with no nitrites and I think this was likely a contaminated sample.  Patient has no dysuria or UTI symptoms.  Remaining lab results are essentially unremarkable.  Likewise, CT of the head was unremarkable.    [SARA]   1229 Patient has received a liter of IV fluids and she is resting comfortably.  She feels well.  Heart rate is 58 with oxygen saturations of 100% blood pressure of 101/69.  I told the patient I would like her to follow-up with her PCP Dr. Gramajo at the end of this week.  I like to have her review the documentation here including the conversation  around medical decision-making, the bradycardia, my concern over her diet and weight and potential tie to the bradycardia.  I would also like her to be reevaluated to ensure that her dizziness and other symptoms that may be related to the head trauma she suffered are reexamined.  Patient is going to have her stitches removed on Saturday and I told her to ensure that she is reevaluated if any of the symptoms get worse and should certainly be seen sooner for new or changing concerns.  Patient feels well at this time and has no headache.  Does not currently feel dizzy.  Feels much better after the IV fluid rehydration.  Impression will include closed head injury, dizziness, bradycardia, recent lip laceration status post closure.  Plan include mandatory follow-up with PCP this week.  Drink plenty of fluids.  Zofran as prescribed.  Return immediately for new or change concerns.    [SARA]      ED Course User Index  [SARA] Maurice oRdriguez MD     Recent Results (from the past 24 hour(s))   Urinalysis With Microscopic If Indicated (No Culture) - Urine, Clean Catch    Collection Time: 02/10/21 10:42 AM    Specimen: Urine, Clean Catch   Result Value Ref Range    Color, UA Dark Yellow (A) Yellow, Straw    Appearance, UA Cloudy (A) Clear    pH, UA 6.0 5.0 - 8.0    Specific Gravity, UA 1.029 1.001 - 1.030    Glucose, UA Negative Negative    Ketones, UA Trace (A) Negative    Bilirubin, UA Negative Negative    Blood, UA Negative Negative    Protein, UA 30 mg/dL (1+) (A) Negative    Leuk Esterase, UA Small (1+) (A) Negative    Nitrite, UA Negative Negative    Urobilinogen, UA 1.0 E.U./dL 0.2 - 1.0 E.U./dL   Urinalysis, Microscopic Only - Urine, Clean Catch    Collection Time: 02/10/21 10:42 AM    Specimen: Urine, Clean Catch   Result Value Ref Range    RBC, UA 0-2 None Seen, 0-2 /HPF    WBC, UA 6-12 (A) None Seen, 0-2 /HPF    Bacteria, UA 2+ (A) None Seen, Trace /HPF    Squamous Epithelial Cells, UA 13-20 (A) None Seen, 0-2 /HPF     Hyaline Casts, UA None Seen 0 - 6 /LPF    Calcium Oxalate Crystals, UA Small/1+ None Seen /HPF    Mucus, UA Small/1+ (A) None Seen, Trace /HPF    Methodology Manual Light Microscopy    POC Pregnancy, Urine    Collection Time: 02/10/21 10:43 AM    Specimen: Urine   Result Value Ref Range    HCG, Urine, QL Negative Negative    Lot Number seepkg     Internal Positive Control Positive     Internal Negative Control Negative    Comprehensive Metabolic Panel    Collection Time: 02/10/21 10:50 AM    Specimen: Blood   Result Value Ref Range    Glucose 96 65 - 99 mg/dL    BUN 11 6 - 20 mg/dL    Creatinine 0.85 0.57 - 1.00 mg/dL    Sodium 140 136 - 145 mmol/L    Potassium 3.8 3.5 - 5.2 mmol/L    Chloride 105 98 - 107 mmol/L    CO2 28.0 22.0 - 29.0 mmol/L    Calcium 9.3 8.6 - 10.5 mg/dL    Total Protein 7.4 6.0 - 8.5 g/dL    Albumin 4.40 3.50 - 5.20 g/dL    ALT (SGPT) 9 1 - 33 U/L    AST (SGOT) 22 1 - 32 U/L    Alkaline Phosphatase 34 (L) 39 - 117 U/L    Total Bilirubin 1.0 0.0 - 1.2 mg/dL    eGFR Non African Amer 81 >60 mL/min/1.73    Globulin 3.0 gm/dL    A/G Ratio 1.5 g/dL    BUN/Creatinine Ratio 12.9 7.0 - 25.0    Anion Gap 7.0 5.0 - 15.0 mmol/L   Lipase    Collection Time: 02/10/21 10:50 AM    Specimen: Blood   Result Value Ref Range    Lipase 23 13 - 60 U/L   Light Blue Top    Collection Time: 02/10/21 10:50 AM   Result Value Ref Range    Extra Tube hold for add-on    Green Top (Gel)    Collection Time: 02/10/21 10:50 AM   Result Value Ref Range    Extra Tube Hold for add-ons.    Lavender Top    Collection Time: 02/10/21 10:50 AM   Result Value Ref Range    Extra Tube hold for add-on    Gold Top - SST    Collection Time: 02/10/21 10:50 AM   Result Value Ref Range    Extra Tube Hold for add-ons.    Gray Top - Ice    Collection Time: 02/10/21 10:50 AM   Result Value Ref Range    Extra Tube Hold for add-ons.    CBC Auto Differential    Collection Time: 02/10/21 10:50 AM    Specimen: Blood   Result Value Ref Range    WBC 4.76  3.40 - 10.80 10*3/mm3    RBC 4.21 3.77 - 5.28 10*6/mm3    Hemoglobin 12.6 12.0 - 15.9 g/dL    Hematocrit 38.3 34.0 - 46.6 %    MCV 91.0 79.0 - 97.0 fL    MCH 29.9 26.6 - 33.0 pg    MCHC 32.9 31.5 - 35.7 g/dL    RDW 12.9 12.3 - 15.4 %    RDW-SD 42.5 37.0 - 54.0 fl    MPV 11.0 6.0 - 12.0 fL    Platelets 274 140 - 450 10*3/mm3    Neutrophil % 28.5 (L) 42.7 - 76.0 %    Lymphocyte % 53.2 (H) 19.6 - 45.3 %    Monocyte % 13.4 (H) 5.0 - 12.0 %    Eosinophil % 3.6 0.3 - 6.2 %    Basophil % 1.1 0.0 - 1.5 %    Immature Grans % 0.2 0.0 - 0.5 %    Neutrophils, Absolute 1.36 (L) 1.70 - 7.00 10*3/mm3    Lymphocytes, Absolute 2.53 0.70 - 3.10 10*3/mm3    Monocytes, Absolute 0.64 0.10 - 0.90 10*3/mm3    Eosinophils, Absolute 0.17 0.00 - 0.40 10*3/mm3    Basophils, Absolute 0.05 0.00 - 0.20 10*3/mm3    Immature Grans, Absolute 0.01 0.00 - 0.05 10*3/mm3    nRBC 0.0 0.0 - 0.2 /100 WBC   ECG 12 Lead    Collection Time: 02/10/21 11:07 AM   Result Value Ref Range    QT Interval 458 ms    QTC Interval 429 ms     Note: In addition to lab results from this visit, the labs listed above may include labs taken at another facility or during a different encounter within the last 24 hours. Please correlate lab times with ED admission and discharge times for further clarification of the services performed during this visit.    CT Head Without Contrast   Final Result   No acute intracranial inability.           This report was finalized on 2/10/2021 11:52 AM by Montez Felix.            Vitals:    02/10/21 1130 02/10/21 1200 02/10/21 1230 02/10/21 1231   BP: 109/75 101/69 105/62    Pulse:  (!) 49  56   Resp:       Temp:       SpO2:  100%  100%   Weight:       Height:         Medications   sodium chloride 0.9 % bolus 1,000 mL (0 mL Intravenous Stopped 2/10/21 1225)   ondansetron (ZOFRAN) injection 4 mg (4 mg Intravenous Given 2/10/21 1101)     ECG/EMG Results (last 24 hours)     ** No results found for the last 24 hours. **        ECG 12 Lead    Final Result   Test Reason : dizzy   Blood Pressure : **/** mmHG   Vent. Rate : 053 BPM     Atrial Rate : 053 BPM      P-R Int : 156 ms          QRS Dur : 084 ms       QT Int : 458 ms       P-R-T Axes : 072 070 061 degrees      QTc Int : 429 ms      ** Poor data quality, interpretation may be adversely affected   Sinus bradycardia   Otherwise normal ECG   When compared with ECG of 09-APR-2016 00:53,   No significant change was found   Confirmed by MAURICE RODRIGUEZ MD ([id number]) on 2/10/2021 2:34:38 PM      Referred By:  EDMD           Confirmed By:MAURICE RODRIGUEZ MD                                                  [facility]    Final diagnoses:   Closed head injury, initial encounter   Lip laceration, subsequent encounter   Dizziness   Bradycardia   Dehydration            Maurice Rodriguez MD  02/10/21 0465

## 2021-02-13 ENCOUNTER — HOSPITAL ENCOUNTER (EMERGENCY)
Facility: HOSPITAL | Age: 26
Discharge: HOME | End: 2021-02-13

## 2021-02-13 PROCEDURE — 99202 OFFICE O/P NEW SF 15 MIN: CPT

## 2021-02-15 ENCOUNTER — LAB (OUTPATIENT)
Dept: LAB | Facility: HOSPITAL | Age: 26
End: 2021-02-15

## 2021-02-15 ENCOUNTER — OFFICE VISIT (OUTPATIENT)
Dept: INTERNAL MEDICINE | Facility: CLINIC | Age: 26
End: 2021-02-15

## 2021-02-15 VITALS
OXYGEN SATURATION: 99 % | BODY MASS INDEX: 16.91 KG/M2 | TEMPERATURE: 96.8 F | HEIGHT: 68 IN | WEIGHT: 111.6 LBS | SYSTOLIC BLOOD PRESSURE: 108 MMHG | RESPIRATION RATE: 16 BRPM | DIASTOLIC BLOOD PRESSURE: 74 MMHG | HEART RATE: 74 BPM

## 2021-02-15 DIAGNOSIS — R63.6 UNDERWEIGHT: ICD-10-CM

## 2021-02-15 DIAGNOSIS — F07.81 POST CONCUSSION SYNDROME: ICD-10-CM

## 2021-02-15 DIAGNOSIS — R00.1 SINUS BRADYCARDIA: Primary | ICD-10-CM

## 2021-02-15 DIAGNOSIS — R00.1 BRADYCARDIA: ICD-10-CM

## 2021-02-15 LAB — TSH SERPL DL<=0.05 MIU/L-ACNC: 1.34 UIU/ML (ref 0.27–4.2)

## 2021-02-15 PROCEDURE — 99213 OFFICE O/P EST LOW 20 MIN: CPT | Performed by: INTERNAL MEDICINE

## 2021-02-15 PROCEDURE — 36415 COLL VENOUS BLD VENIPUNCTURE: CPT

## 2021-02-15 PROCEDURE — 84443 ASSAY THYROID STIM HORMONE: CPT

## 2021-02-15 NOTE — PROGRESS NOTES
Internal Medicine Acute Visit    Chief Complaint   Patient presents with   • Concussion     split lip,head butted by dog         HPI  Ms. Humphrey comes in for ED follow up. She was hit in the head by a dog at work Monday morning, 2/8. She went to  and was then sent to the ED where her lip was sutured. She started to have dizziness, mild JONES, nausea/vomiting and poor oral intake Tuesday, 2/9. She returned to the ED Wednesday, 2/10. She was treated with fluids and zofran. She was prescribed zofran as well. CT of her head was normal. There was concern for low heart rate in the 40s. She had an ECG which showed sinus bradycardia, HR 53. She notes her Apple watch showed HR frequently in the 40s. This notably is present on her Apple watch even prior to onset of symptoms. She additionally notes increased stress after breaking up with her longterm boyfriend the week prior to her head injury which resulted in poor appetite. She does have her mom staying with her and keeping her hydrated, fed. She had a migraine yesterday but otherwise feels that symptoms are improving.       Review of Systems  Review of Systems   Constitutional: Positive for appetite change (poor appetite), fatigue and unexpected weight loss (poor oral intake, stress, poor appetite). Negative for chills and fever.   Eyes: Negative for photophobia.   Respiratory: Positive for shortness of breath (mild exhaustion with exertion).    Cardiovascular: Negative.    Gastrointestinal: Positive for nausea and vomiting.   Neurological: Positive for headache.   Psychiatric/Behavioral: Positive for dysphoric mood (recent breakup) and stress.        Medications  Current Outpatient Medications on File Prior to Visit   Medication Sig Dispense Refill   • Drospiren-Eth Estrad-Levomefol 3-0.02-0.451 MG tablet TAKE 1 TABLET BY MOUTH DAILY 28 tablet 5   • ondansetron (ZOFRAN) 4 MG tablet Take 1 tablet by mouth Every 6 (Six) Hours As Needed for Nausea or Vomiting. 15 tablet 0   •  "clotrimazole (LOTRIMIN) 1 % cream Apply  topically to the appropriate area as directed 2 (Two) Times a Day. 60 g 0   • triamcinolone (KENALOG) 0.1 % cream Apply  topically to the appropriate area as directed 2 (Two) Times a Day. 30 g 1     No current facility-administered medications on file prior to visit.         Allergies  Allergies   Allergen Reactions   • Augmentin [Amoxicillin-Pot Clavulanate] Hives       PMH  Past Medical History:   Diagnosis Date   • ADHD (attention deficit hyperactivity disorder)    • Anxiety        Objective  Visit Vitals  /74   Pulse 74   Temp 96.8 °F (36 °C)   Resp 16   Ht 172.7 cm (67.99\")   Wt 50.6 kg (111 lb 9.6 oz)   LMP 02/02/2021   SpO2 99%   BMI 16.97 kg/m²        Physical Exam  Physical Exam  Vitals signs and nursing note reviewed.   Constitutional:       General: She is not in acute distress.     Appearance: She is well-developed. She is not ill-appearing or toxic-appearing.      Comments: thin   HENT:      Head: Normocephalic and atraumatic.   Eyes:      Conjunctiva/sclera: Conjunctivae normal.   Cardiovascular:      Rate and Rhythm: Normal rate and regular rhythm.      Heart sounds: Normal heart sounds.   Pulmonary:      Effort: Pulmonary effort is normal. No respiratory distress.      Breath sounds: Normal breath sounds.   Musculoskeletal:      Right lower leg: No edema.      Left lower leg: No edema.   Skin:     General: Skin is warm and dry.   Neurological:      General: No focal deficit present.      Mental Status: She is alert and oriented to person, place, and time. Mental status is at baseline.      Cranial Nerves: No cranial nerve deficit.      Gait: Gait normal.   Psychiatric:         Mood and Affect: Mood normal.         Behavior: Behavior normal.         Results  Results for orders placed or performed during the hospital encounter of 02/10/21   Comprehensive Metabolic Panel    Specimen: Blood   Result Value Ref Range    Glucose 96 65 - 99 mg/dL    BUN 11 6 - " 20 mg/dL    Creatinine 0.85 0.57 - 1.00 mg/dL    Sodium 140 136 - 145 mmol/L    Potassium 3.8 3.5 - 5.2 mmol/L    Chloride 105 98 - 107 mmol/L    CO2 28.0 22.0 - 29.0 mmol/L    Calcium 9.3 8.6 - 10.5 mg/dL    Total Protein 7.4 6.0 - 8.5 g/dL    Albumin 4.40 3.50 - 5.20 g/dL    ALT (SGPT) 9 1 - 33 U/L    AST (SGOT) 22 1 - 32 U/L    Alkaline Phosphatase 34 (L) 39 - 117 U/L    Total Bilirubin 1.0 0.0 - 1.2 mg/dL    eGFR Non African Amer 81 >60 mL/min/1.73    Globulin 3.0 gm/dL    A/G Ratio 1.5 g/dL    BUN/Creatinine Ratio 12.9 7.0 - 25.0    Anion Gap 7.0 5.0 - 15.0 mmol/L   Lipase    Specimen: Blood   Result Value Ref Range    Lipase 23 13 - 60 U/L   Urinalysis With Microscopic If Indicated (No Culture) - Urine, Clean Catch    Specimen: Urine, Clean Catch   Result Value Ref Range    Color, UA Dark Yellow (A) Yellow, Straw    Appearance, UA Cloudy (A) Clear    pH, UA 6.0 5.0 - 8.0    Specific Gravity, UA 1.029 1.001 - 1.030    Glucose, UA Negative Negative    Ketones, UA Trace (A) Negative    Bilirubin, UA Negative Negative    Blood, UA Negative Negative    Protein, UA 30 mg/dL (1+) (A) Negative    Leuk Esterase, UA Small (1+) (A) Negative    Nitrite, UA Negative Negative    Urobilinogen, UA 1.0 E.U./dL 0.2 - 1.0 E.U./dL   Gray Top - Ice   Result Value Ref Range    Extra Tube Hold for add-ons.    CBC Auto Differential    Specimen: Blood   Result Value Ref Range    WBC 4.76 3.40 - 10.80 10*3/mm3    RBC 4.21 3.77 - 5.28 10*6/mm3    Hemoglobin 12.6 12.0 - 15.9 g/dL    Hematocrit 38.3 34.0 - 46.6 %    MCV 91.0 79.0 - 97.0 fL    MCH 29.9 26.6 - 33.0 pg    MCHC 32.9 31.5 - 35.7 g/dL    RDW 12.9 12.3 - 15.4 %    RDW-SD 42.5 37.0 - 54.0 fl    MPV 11.0 6.0 - 12.0 fL    Platelets 274 140 - 450 10*3/mm3    Neutrophil % 28.5 (L) 42.7 - 76.0 %    Lymphocyte % 53.2 (H) 19.6 - 45.3 %    Monocyte % 13.4 (H) 5.0 - 12.0 %    Eosinophil % 3.6 0.3 - 6.2 %    Basophil % 1.1 0.0 - 1.5 %    Immature Grans % 0.2 0.0 - 0.5 %    Neutrophils,  "Absolute 1.36 (L) 1.70 - 7.00 10*3/mm3    Lymphocytes, Absolute 2.53 0.70 - 3.10 10*3/mm3    Monocytes, Absolute 0.64 0.10 - 0.90 10*3/mm3    Eosinophils, Absolute 0.17 0.00 - 0.40 10*3/mm3    Basophils, Absolute 0.05 0.00 - 0.20 10*3/mm3    Immature Grans, Absolute 0.01 0.00 - 0.05 10*3/mm3    nRBC 0.0 0.0 - 0.2 /100 WBC   Urinalysis, Microscopic Only - Urine, Clean Catch    Specimen: Urine, Clean Catch   Result Value Ref Range    RBC, UA 0-2 None Seen, 0-2 /HPF    WBC, UA 6-12 (A) None Seen, 0-2 /HPF    Bacteria, UA 2+ (A) None Seen, Trace /HPF    Squamous Epithelial Cells, UA 13-20 (A) None Seen, 0-2 /HPF    Hyaline Casts, UA None Seen 0 - 6 /LPF    Calcium Oxalate Crystals, UA Small/1+ None Seen /HPF    Mucus, UA Small/1+ (A) None Seen, Trace /HPF    Methodology Manual Light Microscopy    POC Pregnancy, Urine    Specimen: Urine   Result Value Ref Range    HCG, Urine, QL Negative Negative    Lot Number seepkg     Internal Positive Control Positive     Internal Negative Control Negative    ECG 12 Lead   Result Value Ref Range    QT Interval 458 ms    QTC Interval 429 ms   Light Blue Top   Result Value Ref Range    Extra Tube hold for add-on    Green Top (Gel)   Result Value Ref Range    Extra Tube Hold for add-ons.    Lavender Top   Result Value Ref Range    Extra Tube hold for add-on    Gold Top - SST   Result Value Ref Range    Extra Tube Hold for add-ons.         Assessment and Plan  Diagnoses and all orders for this visit:    Sinus bradycardia  - Chronic, ECG from 2016 scanned into EMR with [employer] and Apple watch data reviewed on patient's phone showing intermittent bradycardia in 40s-50s over the last 1-2 months.  - She does endorse dizziness and some \"exhaustion\" with activity since head injury which may be symptoms of bradycardia however also potentially post concussion syndrome. I feel the latter is more likely given timeline of symptom onset and chronicity of bradycardia.  - I suspect that her bradycardia " "is secondary to young age, being in shape however will rule out thyroid disease and also discussed potential role of weight.    Post concussion syndrome  - Headache, dizziness, and \"exhaustion\" with activity since head injury  - CT negative  - Discussed that these should improve over weeks to months.    Underweight  - BMI 17, has lost 7lbs in the last 2 months. Worry about eating disorder however she denies this today. She does endorse decreased appetite due to stress of recent breakup which is improving.  - Will schedule close follow up for weight check    Health Maintenance  - Pap smear: She reports last pap smear 2018 at [facility]. Results normal.   - Mammogram: Start screening at age 40.  - Colonoscopy: Start screening at age 45.  - Immunizations: She believes Tdap is UTD.  - HCV: with next labs.  - Depression screening: negative 3/2020    Return in about 15 days (around 3/2/2021) for as scheduled.     Answers for HPI/ROS submitted by the patient on 2/15/2021   What is the primary reason for your visit?: Other  Please describe your symptoms.: Follow-up from visit to ER for lip laceration and concussion. Also to follow-up on low heart rate noted during ER visit.  Have you had these symptoms before?: No  How long have you been having these symptoms?: 1-4 days  Please describe any probable cause for these symptoms. : ER visit    "

## 2021-03-23 ENCOUNTER — OFFICE VISIT (OUTPATIENT)
Dept: INTERNAL MEDICINE | Facility: CLINIC | Age: 26
End: 2021-03-23

## 2021-03-23 VITALS
RESPIRATION RATE: 16 BRPM | HEART RATE: 74 BPM | HEIGHT: 68 IN | SYSTOLIC BLOOD PRESSURE: 112 MMHG | TEMPERATURE: 98.2 F | BODY MASS INDEX: 17.22 KG/M2 | WEIGHT: 113.6 LBS | DIASTOLIC BLOOD PRESSURE: 62 MMHG | OXYGEN SATURATION: 99 %

## 2021-03-23 DIAGNOSIS — G43.709 CHRONIC MIGRAINE WITHOUT AURA WITHOUT STATUS MIGRAINOSUS, NOT INTRACTABLE: ICD-10-CM

## 2021-03-23 DIAGNOSIS — F90.9 ATTENTION DEFICIT HYPERACTIVITY DISORDER (ADHD), UNSPECIFIED ADHD TYPE: ICD-10-CM

## 2021-03-23 DIAGNOSIS — Z01.419 WELL WOMAN EXAM WITH ROUTINE GYNECOLOGICAL EXAM: ICD-10-CM

## 2021-03-23 DIAGNOSIS — Z23 NEED FOR TDAP VACCINATION: ICD-10-CM

## 2021-03-23 DIAGNOSIS — F07.81 POST CONCUSSION SYNDROME: ICD-10-CM

## 2021-03-23 DIAGNOSIS — R63.6 UNDERWEIGHT: ICD-10-CM

## 2021-03-23 DIAGNOSIS — R00.1 BRADYCARDIA: ICD-10-CM

## 2021-03-23 DIAGNOSIS — Z00.00 ANNUAL PHYSICAL EXAM: Primary | ICD-10-CM

## 2021-03-23 PROCEDURE — 99395 PREV VISIT EST AGE 18-39: CPT | Performed by: INTERNAL MEDICINE

## 2021-03-23 PROCEDURE — 90471 IMMUNIZATION ADMIN: CPT | Performed by: INTERNAL MEDICINE

## 2021-03-23 PROCEDURE — 90715 TDAP VACCINE 7 YRS/> IM: CPT | Performed by: INTERNAL MEDICINE

## 2021-03-23 NOTE — PROGRESS NOTES
Internal Medicine Annual Exam  Kelly Humphrey is a 25 y.o. female who presents today for an annual exam and with concerns as outlined below.    Chief Complaint  Chief Complaint   Patient presents with   • Annual Exam        HPI  Ms. Humphrey comes in today for her physical. She is doing well. Mood is described as good. Her dizziness, fatigue, nausea are improving and she may have taken zofran once a week recently. She is not losing weight and reports good appetite, eating 3 meals daily and if unable to stop for lunch due to work she drinks ensure. She walks her dogs 2 miles most days. She needs to have dental and vision exams but both are scheduled. She was referred previously to GYN however due to pandemic and busy work schedule did not make an appt for her pap smear. She would like another referral today. She remains sexually active with a single male partner, on OCP. She has migraines with menstruation but otherwise has no menstrual issues. She treats her headaches with OTC meds. She has gotten both COVID vaccines. She is interested in Tdap today.       Review of Systems  Review of Systems   Constitutional: Negative.    HENT: Negative.    Eyes: Negative.    Respiratory: Negative.    Cardiovascular: Negative.    Gastrointestinal: Positive for nausea (infrequent). Negative for abdominal pain, anal bleeding, blood in stool, constipation, diarrhea, vomiting, GERD and indigestion.   Genitourinary: Negative.    Musculoskeletal: Positive for arthralgias (knee pain). Negative for gait problem.   Skin: Positive for skin lesions (cyst on head, stable).   Neurological: Positive for dizziness (infrequent) and headache (with menstruation).   Hematological: Negative for adenopathy.   Psychiatric/Behavioral: Negative.         Past Medical History  Past Medical History:   Diagnosis Date   • ADHD (attention deficit hyperactivity disorder)    • Anxiety         Surgical History  Past Surgical History:   Procedure Laterality Date   •  "TONSILLECTOMY  2017        Family History  Family History   Adopted: Yes   Problem Relation Age of Onset   • Kidney disease Other         Social History  Social History     Socioeconomic History   • Marital status: Single     Spouse name: Not on file   • Number of children: Not on file   • Years of education: Not on file   • Highest education level: Not on file   Tobacco Use   • Smoking status: Never Smoker   • Smokeless tobacco: Never Used   Vaping Use   • Vaping Use: Never used   Substance and Sexual Activity   • Alcohol use: Yes     Comment: occasional beer, once every 2 weeks   • Drug use: No   • Sexual activity: Yes     Partners: Male     Birth control/protection: OCP, Condom     Comment: single, steady partner        Current Medications  Current Outpatient Medications on File Prior to Visit   Medication Sig Dispense Refill   • Drospiren-Eth Estrad-Levomefol 3-0.02-0.451 MG tablet TAKE 1 TABLET BY MOUTH DAILY 28 tablet 5   • ondansetron (ZOFRAN) 4 MG tablet Take 1 tablet by mouth Every 6 (Six) Hours As Needed for Nausea or Vomiting. 15 tablet 0     No current facility-administered medications on file prior to visit.       Allergies  Allergies   Allergen Reactions   • Augmentin [Amoxicillin-Pot Clavulanate] Hives        Objective  Visit Vitals  /62   Pulse 74   Temp 98.2 °F (36.8 °C)   Resp 16   Ht 172.7 cm (67.99\")   Wt 51.5 kg (113 lb 9.6 oz)   SpO2 99%   BMI 17.28 kg/m²        Physical Exam  Physical Exam  Vitals and nursing note reviewed.   Constitutional:       General: She is not in acute distress.     Appearance: Normal appearance. She is well-developed and normal weight. She is not ill-appearing or diaphoretic.   HENT:      Head: Normocephalic and atraumatic.      Right Ear: Tympanic membrane, ear canal and external ear normal.      Left Ear: Tympanic membrane, ear canal and external ear normal.      Nose: Nose normal.   Eyes:      General: No scleral icterus.     Extraocular Movements: Extraocular " movements intact.      Conjunctiva/sclera: Conjunctivae normal.      Pupils: Pupils are equal, round, and reactive to light.   Cardiovascular:      Rate and Rhythm: Normal rate and regular rhythm.      Heart sounds: Normal heart sounds. No murmur heard.     Pulmonary:      Effort: Pulmonary effort is normal. No respiratory distress.      Breath sounds: Normal breath sounds.   Abdominal:      General: Bowel sounds are normal. There is no distension.      Palpations: Abdomen is soft. There is no mass.      Tenderness: There is no abdominal tenderness.   Musculoskeletal:         General: No deformity.      Cervical back: Neck supple.      Right lower leg: No edema.      Left lower leg: No edema.      Comments: Knees normal appearing bilaterally, no effusion or decreased ROM   Lymphadenopathy:      Cervical: No cervical adenopathy.   Skin:     General: Skin is warm and dry.      Findings: Lesion (small <1cm mobile subcutaneous nodule beneath scalp on crown of head, no overlying skin abnormality) present. No rash.   Neurological:      General: No focal deficit present.      Mental Status: She is alert and oriented to person, place, and time. Mental status is at baseline.      Gait: Gait normal.   Psychiatric:         Mood and Affect: Mood normal.         Behavior: Behavior normal.         Thought Content: Thought content normal.         Judgment: Judgment normal.          Results  Results for orders placed or performed in visit on 02/15/21   TSH Rfx On Abnormal To Free T4    Specimen: Blood   Result Value Ref Range    TSH 1.340 0.270 - 4.200 uIU/mL        Assessment and Plan  Diagnoses and all orders for this visit:    Annual physical exam  - Counseling was given to patient for the following topics:  appropriate exercise, healthy eating habits, disease prevention and importance of immunizations, including risks and benefits. Also discussed the importance of regular dental and vision care, as well recommendation for a  yearly screening skin exam after age 40.  Written information provided to patient on these topics and other health maintenance issues.  - Tdap today  - Referral to GYN for pap smear    Underweight  - Weight improving, eating well. Continue to monitor.    Post concussion syndrome  - Symptoms improving, has occasional residual nausea and uses PRN zofran. She will scheduled follow up should this issue persist and not continue to improve.    Bradycardia  - Raised as a concern by ED after head injury however review of available data suggest it to be chronic. ECG from 2016 scanned into EMR with [facility] and Apple watch data reviewed on patient's phone showing intermittent bradycardia in 40s-50s over the last few months.  - TSH has been checked and normal  - I suspect that her bradycardia is benign secondary to young age, being in shape    Chronic migraine without aura without status migrainosus, not intractable  - Occurs once a month at onset of menstruation and is treated with OTC pain relievers and rest    Attention deficit hyperactivity disorder (ADHD), unspecified ADHD type  - She has not set up an appointment with psychiatry due to pandemic but has the resources that were provided previously and will consider doing so in the future. At this time it is not one of her priorities.    Well woman exam with routine gynecological exam  - Needs pap smear, referral to GYN placed.    Need for Tdap vaccination  - No Tdap within 10y, given today.      Health Maintenance   Topic Date Due   • ANNUAL PHYSICAL  Never done   • HPV VACCINES (1 - 2-dose series) Never done   • TDAP/TD VACCINES (1 - Tdap) Never done   • HEPATITIS C SCREENING  Never done   • PAP SMEAR  Never done   • INFLUENZA VACCINE  Never done   • COVID-19 Vaccine  Completed   • Pneumococcal Vaccine 0-64  Aged Out   • MENINGOCOCCAL VACCINE  Aged Out     Health Maintenance  - Pap smear: She reports last pap smear 2018 at [facility]. Results normal. Referring to  GYN.  - Mammogram: Start screening at age 40.  - Colonoscopy: Start screening at age 45.  - Immunizations: COVID complete. Flu deferred. Tdap today.  - HCV: negative.  - Depression screening: negative 3/2021    Return in about 1 year (around 3/23/2022) for Annual.

## 2021-05-27 DIAGNOSIS — Z30.41 ENCOUNTER FOR SURVEILLANCE OF CONTRACEPTIVE PILLS: ICD-10-CM

## 2021-05-27 RX ORDER — DROSPIRENONE, ETHINYL ESTRADIOL AND LEVOMEFOLATE CALCIUM AND LEVOMEFOLATE CALCIUM 3-0.02(24)
1 KIT ORAL DAILY
Qty: 28 TABLET | Refills: 5 | OUTPATIENT
Start: 2021-05-27 | End: 2021-11-08

## 2021-05-27 NOTE — TELEPHONE ENCOUNTER
Last Office Visit: 03/23/21  Next Office Visit: 03/29/22    Labs completed in past 6 months? yes  Labs completed in past year? yes    Last Refill Date: 12/02/20  Quantity:28  Refills:5    Pharmacy:     Please review pended refill request for any changes needed on refills or quantities. Thank you!

## 2021-06-12 PROCEDURE — 87086 URINE CULTURE/COLONY COUNT: CPT | Performed by: FAMILY MEDICINE

## 2021-06-26 DIAGNOSIS — Z30.41 ENCOUNTER FOR SURVEILLANCE OF CONTRACEPTIVE PILLS: ICD-10-CM

## 2021-06-26 NOTE — TELEPHONE ENCOUNTER
Last Office Visit: 03/23/21  Next Office Visit: 03/29/22    Labs completed in past 6 months? yes  Labs completed in past year? yes    Last Refill Date: 05/27/21  Quantity:28  Refills:5    Pharmacy:     Please review pended refill request for any changes needed on refills or quantities. Thank you!

## 2021-06-27 RX ORDER — DROSPIRENONE, ETHINYL ESTRADIOL AND LEVOMEFOLATE CALCIUM AND LEVOMEFOLATE CALCIUM 3-0.02(24)
1 KIT ORAL DAILY
Qty: 28 TABLET | Refills: 5 | OUTPATIENT
Start: 2021-06-27

## 2021-09-16 ENCOUNTER — OFFICE VISIT (OUTPATIENT)
Dept: INTERNAL MEDICINE | Facility: CLINIC | Age: 26
End: 2021-09-16

## 2021-09-16 VITALS
RESPIRATION RATE: 16 BRPM | HEART RATE: 60 BPM | DIASTOLIC BLOOD PRESSURE: 64 MMHG | BODY MASS INDEX: 17.06 KG/M2 | SYSTOLIC BLOOD PRESSURE: 120 MMHG | TEMPERATURE: 97.1 F | OXYGEN SATURATION: 99 % | WEIGHT: 112.6 LBS | HEIGHT: 68 IN

## 2021-09-16 DIAGNOSIS — M25.531 RIGHT WRIST PAIN: ICD-10-CM

## 2021-09-16 DIAGNOSIS — G43.709 CHRONIC MIGRAINE WITHOUT AURA WITHOUT STATUS MIGRAINOSUS, NOT INTRACTABLE: Primary | ICD-10-CM

## 2021-09-16 PROCEDURE — 99214 OFFICE O/P EST MOD 30 MIN: CPT | Performed by: INTERNAL MEDICINE

## 2021-09-16 RX ORDER — RIZATRIPTAN BENZOATE 10 MG/1
10 TABLET ORAL DAILY PRN
Qty: 12 TABLET | Refills: 1 | Status: SHIPPED | OUTPATIENT
Start: 2021-09-16 | End: 2022-06-20 | Stop reason: SDUPTHER

## 2021-09-16 NOTE — PROGRESS NOTES
Internal Medicine Acute Visit    Chief Complaint   Patient presents with   • Headache   • Wrist Pain     right,clicking, stiff, x2 weeks        HPI  Ms. Humphrye comes in today for migraine and right wrist pain. She has noted increased frequency in migraines. These are now occurring a few times per week with associated nausea, photophobia. Quit excedrin due to potential for medication overuse. Treating with tylenol but has to rest to get relief. She is not sure why headache frequency has increased. She reports she has been trying to hydrate well. No increase in stress. 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 She is getting adequate sleep, 6-7 hours per night. Had initially had neck pain but this is resolved. No fever. She has had R wrist pain on the dorsal aspect for about 2 weeks. Hx of ganglion cyst at this location. Pain is dull ache and occasional popping. No trauma. Pain worse with flexion and pressure.       Review of Systems  Review of Systems   Constitutional: Negative.    Eyes: Positive for photophobia.   Gastrointestinal: Positive for nausea. Negative for vomiting.   Musculoskeletal: Positive for arthralgias (right wrist). Negative for joint swelling.   Neurological: Positive for headache.        Medications  Current Outpatient Medications on File Prior to Visit   Medication Sig Dispense Refill   • Drospiren-Eth Estrad-Levomefol 3-0.02-0.451 MG tablet TAKE 1 TABLET BY MOUTH DAILY 28 tablet 5   • ondansetron (ZOFRAN) 4 MG tablet Take 1 tablet by mouth Every 8 (Eight) Hours As Needed for Nausea or Vomiting. 9 tablet 0   • sulfamethoxazole-trimethoprim (BACTRIM DS,SEPTRA DS) 800-160 MG per tablet Take 1 tablet by mouth 2 (Two) Times a Day. 14 tablet 0     No current facility-administered medications on file prior to visit.        Allergies  Allergies   Allergen Reactions   • Augmentin [Amoxicillin-Pot Clavulanate] Hives       PMH  Past Medical History:   Diagnosis Date   •  "ADHD (attention deficit hyperactivity disorder)    • Anxiety        Objective  Visit Vitals  /64   Pulse 60   Temp 97.1 °F (36.2 °C)   Resp 16   Ht 172.7 cm (67.99\")   Wt 51.1 kg (112 lb 9.6 oz)   SpO2 99%   BMI 17.12 kg/m²        Physical Exam  Physical Exam  Vitals and nursing note reviewed.   Constitutional:       General: She is not in acute distress.     Appearance: Normal appearance. She is well-developed and normal weight. She is not ill-appearing or toxic-appearing.   HENT:      Head: Normocephalic and atraumatic.   Eyes:      Extraocular Movements: Extraocular movements intact.      Conjunctiva/sclera: Conjunctivae normal.      Pupils: Pupils are equal, round, and reactive to light.   Cardiovascular:      Rate and Rhythm: Normal rate and regular rhythm.      Heart sounds: Normal heart sounds.   Pulmonary:      Effort: Pulmonary effort is normal. No respiratory distress.      Breath sounds: Normal breath sounds.   Musculoskeletal:         General: Tenderness (dorsal right wrist) present. No swelling or deformity.   Skin:     General: Skin is warm and dry.   Neurological:      General: No focal deficit present.      Mental Status: She is alert and oriented to person, place, and time. Mental status is at baseline.      Gait: Gait normal.      Comments: No weakness         Results  Results for orders placed or performed during the hospital encounter of 06/12/21   Urine Culture - Urine, Urine, Clean Catch    Specimen: Urine, Clean Catch   Result Value Ref Range    Urine Culture 25,000 CFU/mL Lactobacillus species (A)    POC Urinalysis Dipstick, Multipro (Automated dipstick)    Specimen: Urine   Result Value Ref Range    Color Dark Yellow Yellow, Straw, Dark Yellow, Ana    Clarity, UA Clear Clear    Glucose, UA Negative Negative, 1000 mg/dL (3+) mg/dL    Bilirubin Negative Negative    Ketones, UA Negative Negative    Specific Gravity  1.030 1.005 - 1.030    Blood, UA Negative Negative    pH, Urine 5.5 5.0 " - 8.0    Protein, POC Negative Negative mg/dL    Urobilinogen, UA Normal Normal    Nitrite, UA Negative Negative    Leukocytes Negative Negative        Assessment and Plan  Diagnoses and all orders for this visit:    Chronic migraine without aura without status migrainosus, not intractable  - Headaches increasing in frequency to 2 times weekly, previously once per month around time of menstruation. No change in character or quality.  - Currently inadequately treated with tylenol.  - Start maxalt 10mg PRN. Discussed avoiding overuse and that it should be taken at onset of headache with OTC pain reliever of choice.  - Consider newer alternatives such as ubrelvy if ineffective or not tolerated.  - May also consider prophylactic therapy if headache frequency does not improve    Right wrist pain  - No deformity on exam, no joint swelling or erythema, ROM intact.  - Will obtain xray  - Recommend voltaren gel PRN    Health Maintenance  - Pap smear: She reports last pap smear 2018 at [facility]. Results normal. Referring to GYN.  - Mammogram: Start screening at age 40.  - Colonoscopy: Start screening at age 45.  - Immunizations: COVID complete. Flu deferred. Tdap 2021.  - HCV: negative.  - Depression screening: negative 3/2021    Return in about 3 months (around 12/16/2021) for Follow up migraines.

## 2021-10-06 ENCOUNTER — HOSPITAL ENCOUNTER (OUTPATIENT)
Dept: GENERAL RADIOLOGY | Facility: HOSPITAL | Age: 26
Discharge: HOME | End: 2021-10-06
Admitting: INTERNAL MEDICINE

## 2021-10-06 DIAGNOSIS — M25.531 RIGHT WRIST PAIN: ICD-10-CM

## 2021-10-06 PROCEDURE — 73110 X-RAY EXAM OF WRIST: CPT

## 2021-11-08 ENCOUNTER — HOSPITAL ENCOUNTER (EMERGENCY)
Facility: HOSPITAL | Age: 26
Discharge: HOME | End: 2021-11-09
Attending: EMERGENCY MEDICINE | Admitting: EMERGENCY MEDICINE

## 2021-11-08 ENCOUNTER — APPOINTMENT (OUTPATIENT)
Dept: CT IMAGING | Facility: HOSPITAL | Age: 26
End: 2021-11-08

## 2021-11-08 VITALS
WEIGHT: 110 LBS | SYSTOLIC BLOOD PRESSURE: 114 MMHG | BODY MASS INDEX: 16.67 KG/M2 | HEART RATE: 74 BPM | TEMPERATURE: 98 F | DIASTOLIC BLOOD PRESSURE: 88 MMHG | HEIGHT: 68 IN | RESPIRATION RATE: 18 BRPM | OXYGEN SATURATION: 100 %

## 2021-11-08 DIAGNOSIS — L03.211 CELLULITIS OF FACE: Primary | ICD-10-CM

## 2021-11-08 LAB
ALBUMIN SERPL-MCNC: 4.3 G/DL (ref 3.5–5.2)
ALBUMIN/GLOB SERPL: 1.6 G/DL
ALP SERPL-CCNC: 50 U/L (ref 39–117)
ALT SERPL W P-5'-P-CCNC: 17 U/L (ref 1–33)
ANION GAP SERPL CALCULATED.3IONS-SCNC: 9 MMOL/L (ref 5–15)
AST SERPL-CCNC: 21 U/L (ref 1–32)
BASOPHILS # BLD AUTO: 0.05 10*3/MM3 (ref 0–0.2)
BASOPHILS NFR BLD AUTO: 0.6 % (ref 0–1.5)
BILIRUB SERPL-MCNC: 0.5 MG/DL (ref 0–1.2)
BUN SERPL-MCNC: 12 MG/DL (ref 6–20)
BUN/CREAT SERPL: 16.9 (ref 7–25)
CALCIUM SPEC-SCNC: 9.1 MG/DL (ref 8.6–10.5)
CHLORIDE SERPL-SCNC: 108 MMOL/L (ref 98–107)
CO2 SERPL-SCNC: 27 MMOL/L (ref 22–29)
CREAT SERPL-MCNC: 0.71 MG/DL (ref 0.57–1)
D-LACTATE SERPL-SCNC: 0.5 MMOL/L (ref 0.5–2)
DEPRECATED RDW RBC AUTO: 44.3 FL (ref 37–54)
EOSINOPHIL # BLD AUTO: 0.11 10*3/MM3 (ref 0–0.4)
EOSINOPHIL NFR BLD AUTO: 1.4 % (ref 0.3–6.2)
ERYTHROCYTE [DISTWIDTH] IN BLOOD BY AUTOMATED COUNT: 13.2 % (ref 12.3–15.4)
GFR SERPL CREATININE-BSD FRML MDRD: 100 ML/MIN/1.73
GLOBULIN UR ELPH-MCNC: 2.7 GM/DL
GLUCOSE SERPL-MCNC: 87 MG/DL (ref 65–99)
HCT VFR BLD AUTO: 32.3 % (ref 34–46.6)
HGB BLD-MCNC: 10.8 G/DL (ref 12–15.9)
HOLD SPECIMEN: NORMAL
HOLD SPECIMEN: NORMAL
IMM GRANULOCYTES # BLD AUTO: 0.02 10*3/MM3 (ref 0–0.05)
IMM GRANULOCYTES NFR BLD AUTO: 0.3 % (ref 0–0.5)
LYMPHOCYTES # BLD AUTO: 2.61 10*3/MM3 (ref 0.7–3.1)
LYMPHOCYTES NFR BLD AUTO: 32.8 % (ref 19.6–45.3)
MCH RBC QN AUTO: 30.7 PG (ref 26.6–33)
MCHC RBC AUTO-ENTMCNC: 33.4 G/DL (ref 31.5–35.7)
MCV RBC AUTO: 91.8 FL (ref 79–97)
MONOCYTES # BLD AUTO: 0.92 10*3/MM3 (ref 0.1–0.9)
MONOCYTES NFR BLD AUTO: 11.6 % (ref 5–12)
NEUTROPHILS NFR BLD AUTO: 4.24 10*3/MM3 (ref 1.7–7)
NEUTROPHILS NFR BLD AUTO: 53.3 % (ref 42.7–76)
NRBC BLD AUTO-RTO: 0 /100 WBC (ref 0–0.2)
PLATELET # BLD AUTO: 257 10*3/MM3 (ref 140–450)
PMV BLD AUTO: 11.3 FL (ref 6–12)
POTASSIUM SERPL-SCNC: 4.3 MMOL/L (ref 3.5–5.2)
PROT SERPL-MCNC: 7 G/DL (ref 6–8.5)
RBC # BLD AUTO: 3.52 10*6/MM3 (ref 3.77–5.28)
SODIUM SERPL-SCNC: 144 MMOL/L (ref 136–145)
WBC # BLD AUTO: 7.95 10*3/MM3 (ref 3.4–10.8)
WHOLE BLOOD HOLD SPECIMEN: NORMAL
WHOLE BLOOD HOLD SPECIMEN: NORMAL

## 2021-11-08 PROCEDURE — 80053 COMPREHEN METABOLIC PANEL: CPT | Performed by: PHYSICIAN ASSISTANT

## 2021-11-08 PROCEDURE — 70487 CT MAXILLOFACIAL W/DYE: CPT

## 2021-11-08 PROCEDURE — 99283 EMERGENCY DEPT VISIT LOW MDM: CPT

## 2021-11-08 PROCEDURE — 87040 BLOOD CULTURE FOR BACTERIA: CPT | Performed by: PHYSICIAN ASSISTANT

## 2021-11-08 PROCEDURE — 36415 COLL VENOUS BLD VENIPUNCTURE: CPT

## 2021-11-08 PROCEDURE — 83605 ASSAY OF LACTIC ACID: CPT | Performed by: PHYSICIAN ASSISTANT

## 2021-11-08 PROCEDURE — 85025 COMPLETE CBC W/AUTO DIFF WBC: CPT | Performed by: PHYSICIAN ASSISTANT

## 2021-11-08 RX ORDER — SODIUM CHLORIDE 0.9 % (FLUSH) 0.9 %
10 SYRINGE (ML) INJECTION AS NEEDED
Status: DISCONTINUED | OUTPATIENT
Start: 2021-11-08 | End: 2021-11-09 | Stop reason: HOSPADM

## 2021-11-09 LAB — HOLD SPECIMEN: NORMAL

## 2021-11-09 PROCEDURE — 25010000002 IOPAMIDOL 61 % SOLUTION: Performed by: EMERGENCY MEDICINE

## 2021-11-09 PROCEDURE — 63710000001 ONDANSETRON PER 8 MG: Performed by: PHYSICIAN ASSISTANT

## 2021-11-09 RX ORDER — SULFAMETHOXAZOLE AND TRIMETHOPRIM 800; 160 MG/1; MG/1
1 TABLET ORAL ONCE
Status: COMPLETED | OUTPATIENT
Start: 2021-11-09 | End: 2021-11-09

## 2021-11-09 RX ORDER — ONDANSETRON 4 MG/1
4 TABLET, FILM COATED ORAL ONCE
Status: COMPLETED | OUTPATIENT
Start: 2021-11-09 | End: 2021-11-09

## 2021-11-09 RX ORDER — ONDANSETRON 4 MG/1
4 TABLET, ORALLY DISINTEGRATING ORAL EVERY 8 HOURS PRN
Qty: 9 TABLET | Refills: 0 | Status: SHIPPED | OUTPATIENT
Start: 2021-11-09 | End: 2021-11-12

## 2021-11-09 RX ORDER — SULFAMETHOXAZOLE AND TRIMETHOPRIM 800; 160 MG/1; MG/1
1 TABLET ORAL 2 TIMES DAILY
Qty: 14 TABLET | Refills: 0 | Status: SHIPPED | OUTPATIENT
Start: 2021-11-09 | End: 2021-11-18 | Stop reason: HOSPADM

## 2021-11-09 RX ADMIN — IOPAMIDOL 75 ML: 612 INJECTION, SOLUTION INTRAVENOUS at 00:03

## 2021-11-09 RX ADMIN — ONDANSETRON HYDROCHLORIDE 4 MG: 4 TABLET, FILM COATED ORAL at 00:49

## 2021-11-09 RX ADMIN — SULFAMETHOXAZOLE AND TRIMETHOPRIM 1 TABLET: 800; 160 TABLET ORAL at 00:49

## 2021-11-10 ENCOUNTER — PATIENT MESSAGE (OUTPATIENT)
Dept: INTERNAL MEDICINE | Facility: CLINIC | Age: 26
End: 2021-11-10

## 2021-11-11 NOTE — TELEPHONE ENCOUNTER
From: Kelly Humphrey  To: Faye Gramajo MD  Sent: 11/10/2021 7:31 AM EST  Subject: Follow Up from ER Visit    Good morning,    I was seen by the ER for what turned out to be cellulitis on my face. I missed work yesterday and today as a result of not feeling well from it. My work asked for a doctor’s note for my absence. I realize you had not seen me, but I am unsure how to get in contact with the doctor/PA I had seen on Monday night. I have my after visit summary but that is all.     Do you have any way to get me this? If not, that’s ok I can try to call the ER again.     Thanks,    Kelly Humphrey

## 2021-11-14 LAB
BACTERIA SPEC AEROBE CULT: NORMAL
BACTERIA SPEC AEROBE CULT: NORMAL

## 2021-11-15 ENCOUNTER — APPOINTMENT (OUTPATIENT)
Dept: CT IMAGING | Facility: HOSPITAL | Age: 26
End: 2021-11-15

## 2021-11-15 ENCOUNTER — OFFICE VISIT (OUTPATIENT)
Dept: INTERNAL MEDICINE | Facility: CLINIC | Age: 26
End: 2021-11-15

## 2021-11-15 ENCOUNTER — HOSPITAL ENCOUNTER (INPATIENT)
Facility: HOSPITAL | Age: 26
LOS: 3 days | Discharge: HOME | End: 2021-11-18
Attending: INTERNAL MEDICINE | Admitting: PLASTIC SURGERY

## 2021-11-15 VITALS
SYSTOLIC BLOOD PRESSURE: 114 MMHG | HEIGHT: 68 IN | HEART RATE: 79 BPM | DIASTOLIC BLOOD PRESSURE: 64 MMHG | BODY MASS INDEX: 17.88 KG/M2 | WEIGHT: 118 LBS | TEMPERATURE: 97.3 F | RESPIRATION RATE: 18 BRPM | OXYGEN SATURATION: 98 %

## 2021-11-15 DIAGNOSIS — L03.211 FACIAL CELLULITIS: Primary | ICD-10-CM

## 2021-11-15 LAB
ALBUMIN SERPL-MCNC: 4.3 G/DL (ref 3.5–5.2)
ALBUMIN/GLOB SERPL: 1.7 G/DL
ALP SERPL-CCNC: 49 U/L (ref 39–117)
ALT SERPL W P-5'-P-CCNC: 13 U/L (ref 1–33)
ANION GAP SERPL CALCULATED.3IONS-SCNC: 10 MMOL/L (ref 5–15)
AST SERPL-CCNC: 19 U/L (ref 1–32)
BASOPHILS # BLD AUTO: 0.05 10*3/MM3 (ref 0–0.2)
BASOPHILS NFR BLD AUTO: 0.9 % (ref 0–1.5)
BILIRUB SERPL-MCNC: 0.4 MG/DL (ref 0–1.2)
BUN SERPL-MCNC: 8 MG/DL (ref 6–20)
BUN/CREAT SERPL: 9.3 (ref 7–25)
CALCIUM SPEC-SCNC: 9 MG/DL (ref 8.6–10.5)
CHLORIDE SERPL-SCNC: 105 MMOL/L (ref 98–107)
CO2 SERPL-SCNC: 22 MMOL/L (ref 22–29)
CREAT SERPL-MCNC: 0.86 MG/DL (ref 0.57–1)
CRP SERPL-MCNC: <0.3 MG/DL (ref 0–0.5)
D-LACTATE SERPL-SCNC: 0.6 MMOL/L (ref 0.5–2)
DEPRECATED RDW RBC AUTO: 44.4 FL (ref 37–54)
EOSINOPHIL # BLD AUTO: 0.11 10*3/MM3 (ref 0–0.4)
EOSINOPHIL NFR BLD AUTO: 1.9 % (ref 0.3–6.2)
ERYTHROCYTE [DISTWIDTH] IN BLOOD BY AUTOMATED COUNT: 13.1 % (ref 12.3–15.4)
ERYTHROCYTE [SEDIMENTATION RATE] IN BLOOD: 5 MM/HR (ref 0–20)
GFR SERPL CREATININE-BSD FRML MDRD: 80 ML/MIN/1.73
GLOBULIN UR ELPH-MCNC: 2.5 GM/DL
GLUCOSE SERPL-MCNC: 88 MG/DL (ref 65–99)
HCT VFR BLD AUTO: 32.6 % (ref 34–46.6)
HGB BLD-MCNC: 10.6 G/DL (ref 12–15.9)
IMM GRANULOCYTES # BLD AUTO: 0.01 10*3/MM3 (ref 0–0.05)
IMM GRANULOCYTES NFR BLD AUTO: 0.2 % (ref 0–0.5)
LYMPHOCYTES # BLD AUTO: 2.68 10*3/MM3 (ref 0.7–3.1)
LYMPHOCYTES NFR BLD AUTO: 47 % (ref 19.6–45.3)
MCH RBC QN AUTO: 30 PG (ref 26.6–33)
MCHC RBC AUTO-ENTMCNC: 32.5 G/DL (ref 31.5–35.7)
MCV RBC AUTO: 92.4 FL (ref 79–97)
MONOCYTES # BLD AUTO: 0.49 10*3/MM3 (ref 0.1–0.9)
MONOCYTES NFR BLD AUTO: 8.6 % (ref 5–12)
MRSA DNA SPEC QL NAA+PROBE: NEGATIVE
NEUTROPHILS NFR BLD AUTO: 2.36 10*3/MM3 (ref 1.7–7)
NEUTROPHILS NFR BLD AUTO: 41.4 % (ref 42.7–76)
NRBC BLD AUTO-RTO: 0 /100 WBC (ref 0–0.2)
PLATELET # BLD AUTO: 262 10*3/MM3 (ref 140–450)
PMV BLD AUTO: 10.7 FL (ref 6–12)
POTASSIUM SERPL-SCNC: 4.6 MMOL/L (ref 3.5–5.2)
PROT SERPL-MCNC: 6.8 G/DL (ref 6–8.5)
RBC # BLD AUTO: 3.53 10*6/MM3 (ref 3.77–5.28)
SARS-COV-2 RDRP RESP QL NAA+PROBE: NORMAL
SODIUM SERPL-SCNC: 137 MMOL/L (ref 136–145)
WBC # BLD AUTO: 5.7 10*3/MM3 (ref 3.4–10.8)

## 2021-11-15 PROCEDURE — 99223 1ST HOSP IP/OBS HIGH 75: CPT | Performed by: INTERNAL MEDICINE

## 2021-11-15 PROCEDURE — 85652 RBC SED RATE AUTOMATED: CPT | Performed by: INTERNAL MEDICINE

## 2021-11-15 PROCEDURE — 87635 SARS-COV-2 COVID-19 AMP PRB: CPT | Performed by: INTERNAL MEDICINE

## 2021-11-15 PROCEDURE — 25010000002 VANCOMYCIN 10 G RECONSTITUTED SOLUTION: Performed by: INTERNAL MEDICINE

## 2021-11-15 PROCEDURE — 70488 CT MAXILLOFACIAL W/O & W/DYE: CPT

## 2021-11-15 PROCEDURE — 25010000002 IOPAMIDOL 61 % SOLUTION: Performed by: INTERNAL MEDICINE

## 2021-11-15 PROCEDURE — 86140 C-REACTIVE PROTEIN: CPT | Performed by: INTERNAL MEDICINE

## 2021-11-15 PROCEDURE — 87641 MR-STAPH DNA AMP PROBE: CPT | Performed by: INTERNAL MEDICINE

## 2021-11-15 PROCEDURE — 87040 BLOOD CULTURE FOR BACTERIA: CPT | Performed by: INTERNAL MEDICINE

## 2021-11-15 PROCEDURE — 85025 COMPLETE CBC W/AUTO DIFF WBC: CPT | Performed by: INTERNAL MEDICINE

## 2021-11-15 PROCEDURE — 80053 COMPREHEN METABOLIC PANEL: CPT | Performed by: INTERNAL MEDICINE

## 2021-11-15 PROCEDURE — 99214 OFFICE O/P EST MOD 30 MIN: CPT | Performed by: INTERNAL MEDICINE

## 2021-11-15 PROCEDURE — 25010000002 DIPHENHYDRAMINE PER 50 MG: Performed by: INTERNAL MEDICINE

## 2021-11-15 PROCEDURE — 83605 ASSAY OF LACTIC ACID: CPT | Performed by: INTERNAL MEDICINE

## 2021-11-15 RX ORDER — DIPHENHYDRAMINE HYDROCHLORIDE 50 MG/ML
25 INJECTION INTRAMUSCULAR; INTRAVENOUS NIGHTLY PRN
Status: DISCONTINUED | OUTPATIENT
Start: 2021-11-15 | End: 2021-11-18 | Stop reason: HOSPADM

## 2021-11-15 RX ORDER — ACETAMINOPHEN 650 MG/1
650 SUPPOSITORY RECTAL EVERY 4 HOURS PRN
Status: DISCONTINUED | OUTPATIENT
Start: 2021-11-15 | End: 2021-11-18 | Stop reason: HOSPADM

## 2021-11-15 RX ORDER — SODIUM CHLORIDE 0.9 % (FLUSH) 0.9 %
10 SYRINGE (ML) INJECTION EVERY 12 HOURS SCHEDULED
Status: DISCONTINUED | OUTPATIENT
Start: 2021-11-15 | End: 2021-11-18 | Stop reason: HOSPADM

## 2021-11-15 RX ORDER — VANCOMYCIN HYDROCHLORIDE 1 G/200ML
1000 INJECTION, SOLUTION INTRAVENOUS EVERY 12 HOURS
Status: DISCONTINUED | OUTPATIENT
Start: 2021-11-16 | End: 2021-11-16

## 2021-11-15 RX ORDER — ONDANSETRON 2 MG/ML
4 INJECTION INTRAMUSCULAR; INTRAVENOUS EVERY 6 HOURS PRN
Status: DISCONTINUED | OUTPATIENT
Start: 2021-11-15 | End: 2021-11-18 | Stop reason: HOSPADM

## 2021-11-15 RX ORDER — ONDANSETRON 4 MG/1
4 TABLET, FILM COATED ORAL EVERY 6 HOURS PRN
Status: DISCONTINUED | OUTPATIENT
Start: 2021-11-15 | End: 2021-11-18 | Stop reason: HOSPADM

## 2021-11-15 RX ORDER — DIPHENHYDRAMINE HYDROCHLORIDE 50 MG/ML
25 INJECTION INTRAMUSCULAR; INTRAVENOUS ONCE
Status: COMPLETED | OUTPATIENT
Start: 2021-11-15 | End: 2021-11-15

## 2021-11-15 RX ORDER — ACETAMINOPHEN 325 MG/1
650 TABLET ORAL EVERY 4 HOURS PRN
Status: DISCONTINUED | OUTPATIENT
Start: 2021-11-15 | End: 2021-11-18 | Stop reason: HOSPADM

## 2021-11-15 RX ORDER — SODIUM CHLORIDE 0.9 % (FLUSH) 0.9 %
10 SYRINGE (ML) INJECTION AS NEEDED
Status: DISCONTINUED | OUTPATIENT
Start: 2021-11-15 | End: 2021-11-18 | Stop reason: HOSPADM

## 2021-11-15 RX ORDER — L.ACID,PARA/B.BIFIDUM/S.THERM 8B CELL
1 CAPSULE ORAL DAILY
Status: DISCONTINUED | OUTPATIENT
Start: 2021-11-15 | End: 2021-11-16

## 2021-11-15 RX ORDER — ACETAMINOPHEN 160 MG/5ML
650 SOLUTION ORAL EVERY 4 HOURS PRN
Status: DISCONTINUED | OUTPATIENT
Start: 2021-11-15 | End: 2021-11-18 | Stop reason: HOSPADM

## 2021-11-15 RX ADMIN — AZTREONAM 2 G: 2 INJECTION, POWDER, LYOPHILIZED, FOR SOLUTION INTRAMUSCULAR; INTRAVENOUS at 14:15

## 2021-11-15 RX ADMIN — Medication 1 CAPSULE: at 20:43

## 2021-11-15 RX ADMIN — AZTREONAM 1 G: 1 INJECTION, POWDER, LYOPHILIZED, FOR SOLUTION INTRAMUSCULAR; INTRAVENOUS at 20:36

## 2021-11-15 RX ADMIN — IOPAMIDOL 75 ML: 612 INJECTION, SOLUTION INTRAVENOUS at 21:48

## 2021-11-15 RX ADMIN — VANCOMYCIN HYDROCHLORIDE 1250 MG: 10 INJECTION, POWDER, LYOPHILIZED, FOR SOLUTION INTRAVENOUS at 15:56

## 2021-11-15 RX ADMIN — SODIUM CHLORIDE, PRESERVATIVE FREE 10 ML: 5 INJECTION INTRAVENOUS at 20:36

## 2021-11-15 RX ADMIN — DIPHENHYDRAMINE HYDROCHLORIDE 25 MG: 50 INJECTION INTRAMUSCULAR; INTRAVENOUS at 17:03

## 2021-11-15 NOTE — CASE MANAGEMENT/SOCIAL WORK
Continued Stay Note  [facility]     Patient Name: Kelly Humphrey  MRN: [id number]  Today's Date: 11/15/2021    Admit Date: 11/15/2021     Discharge Plan     Row Name 11/15/21 1615       Plan    Plan Met with the patient for a discharge planning assessment. The patient is independent and the patient will be going home from the hospital at discharge. The patient lives in [place] and her insurance is [employer] and her primary care provider is Dr. Gramajo. Case managment will continue to follow.               Discharge Codes    No documentation.                     ELAINE Garcia  Continued Stay Note  [facility]     Patient Name: Kelly Humphrey  MRN: [id number]  Today's Date: 11/15/2021    Admit Date: 11/15/2021     Discharge Plan     Row Name 11/15/21 1615       Plan    Plan Met with the patient for a discharge planning assessment. The patient is independent and the patient will be going home from the hospital at discharge. The patient lives in [place] and her insurance is [employer] and her primary care provider is Dr. Gramajo. Case managment will continue to follow.               Discharge Codes    No documentation.                     ELAINE Garcia

## 2021-11-15 NOTE — PROGRESS NOTES
Pharmacy Consult-Vancomycin Dosing  Kelly Humphrey is a  26 y.o. female receiving vancomycin therapy.     Indication: SSTI - facial cellulitis  Consulting Provider: Hospitalist - Dr. Noriega  ID Consult: Y - pending    Goal AUC: 400 - 600 mg/L*hr    Current Antimicrobial Therapy  Anti-Infectives (From admission, onward)      Ordered     Dose/Rate Route Frequency Start Stop    11/15/21 1229  aztreonam (AZACTAM) 1 g/100 mL 0.9% NS IVPB ([facility]p)        Ordering Provider: Tyrone Noriega MD    1 g  over 4 Hours Intravenous Every 8 Hours 11/15/21 2000 11/22/21 1959    11/15/21 1234  vancomycin 1250 mg/250 mL 0.9% NS IVPB ([facility])        Ordering Provider: Tyrone Noriega MD    1,250 mg  over 90 Minutes Intravenous Once 11/15/21 1330      11/15/21 1229  aztreonam (AZACTAM) 2 g/100 mL 0.9% NS ([facility]p)        Ordering Provider: Tyrone Noriega MD    2 g  over 30 Minutes Intravenous Once 11/15/21 1315      11/15/21 1229  Pharmacy to dose vancomycin        Ordering Provider: Tyrone Noriega MD     Does not apply Continuous PRN 11/15/21 1205 11/22/21 1204          Allergies  Allergies as of 11/15/2021 - Reviewed 11/15/2021   Allergen Reaction Noted    Augmentin [amoxicillin-pot clavulanate] Hives 03/03/2020     Labs  Results from last 7 days   Lab Units 11/08/21 2027   BUN mg/dL 12   CREATININE mg/dL 0.71     Results from last 7 days   Lab Units 11/08/21 2027   WBC 10*3/mm3 7.95     Evaluation of Dosing     Is Patient on Dialysis or Renal Replacement: N      Ht -    Wt -      Estimated Creatinine Clearance: 101.4 mL/min (by C-G formula based on SCr of 0.71 mg/dL).    Intake & Output (last 3 days)       None          Microbiology and Radiology  Microbiology Results (last 10 days)       Procedure Component Value - Date/Time    Blood Culture - Blood, Arm, Left [[id number]]  (Normal) Collected: 11/08/21 4290    Lab Status: Final result Specimen: Blood from Arm, Left Updated: 11/14/21 0015     Blood Culture No growth at 5 days    Blood  Culture - Blood, Arm, Right [[id number]]  (Normal) Collected: 11/08/21 3024    Lab Status: Final result Specimen: Blood from Arm, Right Updated: 11/14/21 0015     Blood Culture No growth at 5 days          Reported Vancomycin Levels                 [employer] AUC Calculation:    Current AUC: -- mg/L*hr    Predicted Steady State AUC on Current Dose: -- mg/L*hr  _________________________________    Predicted Steady State AUC on New Dose: 506 mg/L*hr    Assessment/Plan:  Pharmacy to dose vancomycin for facial cellulitis. Goal -600 mg/L*hr.  Patient to receive loading dose of vancomycin 1250mg (~22mg/kg) IV on 11/15 @ 1300. Initiate maintenance dose of vancomycin 1000mg (~19mg/kg) IV Q12hr on 11/16 @ 0000 (midnight).  Assess clearance by trough level on 11/16 @ 1100, prior to 3rd dose.  Note: dosing patient a little more aggressively for adequate penetration of facial sinus tissue in the setting of young age and good renal function.   Pharmacy will continue to monitor renal function, cultures and sensitivities, and clinical status to adjust regimen as necessary.    Taylor Riedel, PharmD  Pharmacy Resident  11/15/2021  12:52 EST

## 2021-11-15 NOTE — PROGRESS NOTES
Internal Medicine Follow Up    Chief Complaint  Kelly Humphrey is a 26 y.o. female who presents today for follow up of chronic medical conditions outlined below.    Chief Complaint   Patient presents with   • Follow-up     ED, facial cellulitis        HPI  Ms. Humphrey comes in today for worsening facial cellulitis. She was seen in the ED 11/8 for facial pain, swelling, redness. She had CT which ruled out abscess but showed signs of cellulitis. WBC, lactate were normal and blood cultures were NGTD. She was prescribed bactrim x7d which will end today. Her cellulitis has not improved and the area beneath her R eye has increased in size and is now draining purulent fluid. She notes fever to 101 on 11/9 but has been afebrile since. She has been taking ibuprofen around the clock for pain. Her EOM are intact however she does note floaters in her R eye as if she had been staring at a bright light. She also endorses fatigue and lethargy.       Review of Systems  Review of Systems   Constitutional: Positive for fatigue and fever.   Eyes: Positive for visual disturbance (floaters in R eye). Negative for blurred vision, photophobia, pain, discharge and redness.   Skin: Positive for color change and rash.        Current Medications  Current Outpatient Medications on File Prior to Visit   Medication Sig Dispense Refill   • rizatriptan (Maxalt) 10 MG tablet Take 1 tablet by mouth Daily As Needed for Migraine. May repeat in 2 hours if needed 12 tablet 1   • sulfamethoxazole-trimethoprim (BACTRIM DS,SEPTRA DS) 800-160 MG per tablet Take 1 tablet by mouth 2 (Two) Times a Day for 7 days. 14 tablet 0   • [DISCONTINUED] Drospiren-Eth Estrad-Levomefol 3-0.02-0.451 MG tablet TAKE 1 TABLET BY MOUTH DAILY 28 tablet 5     No current facility-administered medications on file prior to visit.       Allergies  Allergies   Allergen Reactions   • Augmentin [Amoxicillin-Pot Clavulanate] Hives       Objective  Visit Vitals  /64   Pulse 79   Temp  "97.3 °F (36.3 °C)   Resp 18   Ht 172.7 cm (67.99\")   Wt 53.5 kg (118 lb)   LMP 10/29/2021 (LMP Unknown)   SpO2 98%   BMI 17.95 kg/m²        Physical Exam  Physical Exam  Vitals and nursing note reviewed.   Constitutional:       General: She is not in acute distress.     Appearance: She is well-developed and normal weight. She is not toxic-appearing.   HENT:      Head: Normocephalic and atraumatic.        Comments: Erythema and swelling localized to skin beneath R eye abutting nose and midline forehead. Open areas within cellulitis of R cheek.  Eyes:      General:         Right eye: No discharge.         Left eye: No discharge.      Extraocular Movements: Extraocular movements intact.      Conjunctiva/sclera: Conjunctivae normal.      Pupils: Pupils are equal, round, and reactive to light.   Cardiovascular:      Rate and Rhythm: Normal rate and regular rhythm.      Heart sounds: Normal heart sounds. No murmur heard.      Pulmonary:      Effort: Pulmonary effort is normal. No respiratory distress.      Breath sounds: Normal breath sounds.   Skin:     General: Skin is warm and dry.   Neurological:      Mental Status: She is alert and oriented to person, place, and time. Mental status is at baseline.         Results  Results for orders placed or performed during the hospital encounter of 11/08/21   Blood Culture - Blood, Arm, Right    Specimen: Arm, Right; Blood   Result Value Ref Range    Blood Culture No growth at 5 days    Blood Culture - Blood, Arm, Left    Specimen: Arm, Left; Blood   Result Value Ref Range    Blood Culture No growth at 5 days    Comprehensive Metabolic Panel    Specimen: Blood   Result Value Ref Range    Glucose 87 65 - 99 mg/dL    BUN 12 6 - 20 mg/dL    Creatinine 0.71 0.57 - 1.00 mg/dL    Sodium 144 136 - 145 mmol/L    Potassium 4.3 3.5 - 5.2 mmol/L    Chloride 108 (H) 98 - 107 mmol/L    CO2 27.0 22.0 - 29.0 mmol/L    Calcium 9.1 8.6 - 10.5 mg/dL    Total Protein 7.0 6.0 - 8.5 g/dL    Albumin " 4.30 3.50 - 5.20 g/dL    ALT (SGPT) 17 1 - 33 U/L    AST (SGOT) 21 1 - 32 U/L    Alkaline Phosphatase 50 39 - 117 U/L    Total Bilirubin 0.5 0.0 - 1.2 mg/dL    eGFR Non African Amer 100 >60 mL/min/1.73    Globulin 2.7 gm/dL    A/G Ratio 1.6 g/dL    BUN/Creatinine Ratio 16.9 7.0 - 25.0    Anion Gap 9.0 5.0 - 15.0 mmol/L   Lactic Acid, Plasma    Specimen: Blood   Result Value Ref Range    Lactate 0.5 0.5 - 2.0 mmol/L   CBC Auto Differential    Specimen: Blood   Result Value Ref Range    WBC 7.95 3.40 - 10.80 10*3/mm3    RBC 3.52 (L) 3.77 - 5.28 10*6/mm3    Hemoglobin 10.8 (L) 12.0 - 15.9 g/dL    Hematocrit 32.3 (L) 34.0 - 46.6 %    MCV 91.8 79.0 - 97.0 fL    MCH 30.7 26.6 - 33.0 pg    MCHC 33.4 31.5 - 35.7 g/dL    RDW 13.2 12.3 - 15.4 %    RDW-SD 44.3 37.0 - 54.0 fl    MPV 11.3 6.0 - 12.0 fL    Platelets 257 140 - 450 10*3/mm3    Neutrophil % 53.3 42.7 - 76.0 %    Lymphocyte % 32.8 19.6 - 45.3 %    Monocyte % 11.6 5.0 - 12.0 %    Eosinophil % 1.4 0.3 - 6.2 %    Basophil % 0.6 0.0 - 1.5 %    Immature Grans % 0.3 0.0 - 0.5 %    Neutrophils, Absolute 4.24 1.70 - 7.00 10*3/mm3    Lymphocytes, Absolute 2.61 0.70 - 3.10 10*3/mm3    Monocytes, Absolute 0.92 (H) 0.10 - 0.90 10*3/mm3    Eosinophils, Absolute 0.11 0.00 - 0.40 10*3/mm3    Basophils, Absolute 0.05 0.00 - 0.20 10*3/mm3    Immature Grans, Absolute 0.02 0.00 - 0.05 10*3/mm3    nRBC 0.0 0.0 - 0.2 /100 WBC   Green Top (Gel)   Result Value Ref Range    Extra Tube Hold for add-ons.    Lavender Top   Result Value Ref Range    Extra Tube hold for add-on    Gold Top - SST   Result Value Ref Range    Extra Tube Hold for add-ons.    Gray Top   Result Value Ref Range    Extra Tube Hold for add-ons.    Light Blue Top   Result Value Ref Range    Extra Tube hold for add-on         Assessment and Plan  Diagnoses and all orders for this visit:    Facial cellulitis  - Failed to respond to outpatient antibiotics.  - Vitals stable but she has had fever on one occasion  - Recommend  admission for IV abx, consider repeat CT scan to reevaluate for abscess given new draining wounds.  - Discussed case with Dr. Moseley and she will be directly admitted to the hospital for care. Patient informed and will self transport.    Return if symptoms worsen or fail to improve.

## 2021-11-15 NOTE — PLAN OF CARE
Problem: Adult Inpatient Plan of Care  Goal: Plan of Care Review  Outcome: Ongoing, Progressing  Flowsheets (Taken 11/15/2021 1827)  Progress: no change  Plan of Care Reviewed With: patient  Outcome Summary: Pt direct admit. No complaints at this time. Will continue plan of care.  Goal: Patient-Specific Goal (Individualized)  Outcome: Ongoing, Progressing  Goal: Absence of Hospital-Acquired Illness or Injury  Outcome: Ongoing, Progressing  Intervention: Identify and Manage Fall Risk  Recent Flowsheet Documentation  Taken 11/15/2021 1800 by Myrna Porter RN  Safety Promotion/Fall Prevention:   activity supervised   assistive device/personal items within reach   clutter free environment maintained   fall prevention program maintained   lighting adjusted   gait belt   mobility aid in reach   nonskid shoes/slippers when out of bed   room organization consistent   safety round/check completed  Taken 11/15/2021 1600 by Myrna Porter RN  Safety Promotion/Fall Prevention:   activity supervised   assistive device/personal items within reach   clutter free environment maintained   fall prevention program maintained   gait belt   lighting adjusted   mobility aid in reach   nonskid shoes/slippers when out of bed   room organization consistent   safety round/check completed  Taken 11/15/2021 1400 by Myrna Porter RN  Safety Promotion/Fall Prevention:   activity supervised   assistive device/personal items within reach   clutter free environment maintained   fall prevention program maintained   gait belt   lighting adjusted   mobility aid in reach   nonskid shoes/slippers when out of bed   room organization consistent  Taken 11/15/2021 1200 by Myrna Porter RN  Safety Promotion/Fall Prevention:   activity supervised   assistive device/personal items within reach   clutter free environment maintained   fall prevention program maintained   gait belt   lighting adjusted   mobility aid in reach   nonskid shoes/slippers when out  of bed   room organization consistent   safety round/check completed  Taken 11/15/2021 1118 by Myrna Porter RN  Safety Promotion/Fall Prevention:   activity supervised   assistive device/personal items within reach   clutter free environment maintained   fall prevention program maintained   gait belt   lighting adjusted   mobility aid in reach   nonskid shoes/slippers when out of bed   room organization consistent   safety round/check completed  Intervention: Prevent Skin Injury  Recent Flowsheet Documentation  Taken 11/15/2021 1800 by Myrna Porter RN  Body Position: position changed independently  Taken 11/15/2021 1600 by Myrna Porter RN  Body Position: position changed independently  Taken 11/15/2021 1400 by Myrna Porter RN  Body Position: position changed independently  Taken 11/15/2021 1200 by Myrna Porter RN  Body Position: position changed independently  Taken 11/15/2021 1118 by Myrna Porter RN  Body Position: position changed independently  Intervention: Prevent and Manage VTE (venous thromboembolism) Risk  Recent Flowsheet Documentation  Taken 11/15/2021 1118 by Myrna Porter RN  VTE Prevention/Management:   bilateral   dorsiflexion/plantar flexion performed  Intervention: Prevent Infection  Recent Flowsheet Documentation  Taken 11/15/2021 1800 by Myrna Porter RN  Infection Prevention:   hand hygiene promoted   rest/sleep promoted   single patient room provided  Taken 11/15/2021 1600 by Myrna Porter RN  Infection Prevention:   hand hygiene promoted   rest/sleep promoted   single patient room provided  Taken 11/15/2021 1400 by Myrna Porter RN  Infection Prevention:   hand hygiene promoted   rest/sleep promoted   single patient room provided  Taken 11/15/2021 1200 by Myrna Porter RN  Infection Prevention:   hand hygiene promoted   rest/sleep promoted   single patient room provided  Taken 11/15/2021 1118 by Myrna Porter RN  Infection Prevention:   hand hygiene promoted   rest/sleep  promoted   single patient room provided  Goal: Optimal Comfort and Wellbeing  Outcome: Ongoing, Progressing  Intervention: Provide Person-Centered Care  Recent Flowsheet Documentation  Taken 11/15/2021 1118 by Myrna Porter, RN  Trust Relationship/Rapport:   care explained   choices provided  Goal: Readiness for Transition of Care  Outcome: Ongoing, Progressing  Intervention: Mutually Develop Transition Plan  Recent Flowsheet Documentation  Taken 11/15/2021 1109 by Myrna Porter, RN  Transportation Anticipated: family or friend will provide  Patient/Family Anticipated Services at Transition: none  Patient/Family Anticipates Transition to: home  Taken 11/15/2021 1108 by Myrna Porter, RN  Equipment Currently Used at Home: none   Goal Outcome Evaluation:  Plan of Care Reviewed With: patient        Progress: no change  Outcome Summary: Pt direct admit. No complaints at this time. Will continue plan of care.

## 2021-11-15 NOTE — H&P
[facility] Medicine Services  HISTORY AND PHYSICAL    Patient Name: Kelly Humphrey  : 1995  MRN: [id number]  Primary Care Physician: Faye Gramajo MD  Date of admission: 11/15/2021      Subjective   Subjective     Chief Complaint:  Facial cellulitis    HPI:  Kelly Humphrey is a 26 y.o. female with h/o migraines states that 9 days ago/last Saturday that she had an area that she thought was a pimple on her face and tried topical acne treatment but became much worse by  with swelling to the point right eyelid was shut and went to ER.  She had a CT that reportedly showed cellulitis only and was discharged with bactrim.  She states that she has not really improved.  She started not feeling good again on Friday and Saturday and right face was swelling/no better despite hot/cold compresses, ibuprofen, and bactrim.  She states that right eye was swollen shut again on Saturday morning, that has now improved but she saw her PCP today who sent patient for direct admit to the hospital for facial cellulitis not improving with PO/outpatient treatment.  Denies any vision changes currently though had a weird feeling with her vision in her right eye on Saturday, this has now resolved.  States that last fever was last Tuesday and was up to 101.        COVID Details:    Symptoms:    [] NONE [x] Fever []  Cough [] Shortness of breath [] Change in taste/smell      The patient has a COVID HM Topic on their chart, and they are fully vaccinated.      Review of Systems   Gen- +fevers last on Tuesday, chills  CV- No chest pain, palpitations  Resp- No cough, dyspnea  GI- No N/V/D, abd pain    All other systems reviewed and are negative.     Personal History     Past Medical History:   Diagnosis Date   • ADHD (attention deficit hyperactivity disorder)    • Anxiety        Past Surgical History:   Procedure Laterality Date   • TONSILLECTOMY  2017       Family History:  family history includes  Kidney disease in an other family member. She was adopted. Otherwise pertinent FHx was reviewed and unremarkable. Famhx Unknown/noncontributory as patient is adopted    Social History:  reports that she has never smoked. She has never used smokeless tobacco. She reports current alcohol use. She reports that she does not use drugs. occasional etoh  Social History     Social History Narrative   • Not on file       Medications:  Available home medication information reviewed.  Medications Prior to Admission   Medication Sig Dispense Refill Last Dose   • sulfamethoxazole-trimethoprim (BACTRIM DS,SEPTRA DS) 800-160 MG per tablet Take 1 tablet by mouth 2 (Two) Times a Day for 7 days. 14 tablet 0 11/14/2021 at Unknown time   • rizatriptan (Maxalt) 10 MG tablet Take 1 tablet by mouth Daily As Needed for Migraine. May repeat in 2 hours if needed 12 tablet 1        Allergies   Allergen Reactions   • Augmentin [Amoxicillin-Pot Clavulanate] Hives       Objective   Objective     Vital Signs:   Temp:  [97.3 °F (36.3 °C)-98.4 °F (36.9 °C)] 98.4 °F (36.9 °C)  Heart Rate:  [58-79] 58  Resp:  [18] 18  BP: (100-114)/(64-66) 100/66       Physical Exam   Constitutional: Awake, alert  Eyes: PERRLA, sclerae anicteric, no conjunctival injection  HENT: NCAT, mucous membranes moist  Neck: Supple, no thyromegaly, no lymphadenopathy, trachea midline  Respiratory: Clear to auscultation bilaterally, nonlabored respirations   Cardiovascular: RRR, no murmurs, rubs, or gallops, palpable pedal pulses bilaterally  Gastrointestinal: Positive bowel sounds, soft, nontender, nondistended  Musculoskeletal: No bilateral ankle edema, no clubbing or cyanosis to extremities  Psychiatric: Appropriate affect, cooperative  Neurologic: Oriented x 3, strength symmetric in all extremities, Cranial Nerves grossly intact to confrontation, speech clear  Skin:erythema and swelling right maxillary area and 2 localized areas of circumferential swelling erythema on right  forehead.      Result Review:  I have personally reviewed the results from the time of this admission to 11/15/2021 12:44 EST and agree with these findings:  [x]  Laboratory  [x]  Microbiology  []  Radiology  []  EKG/Telemetry   []  Cardiology/Vascular   []  Pathology  [x]  Old records  []  Other:  Most notable findings include:     LAB RESULTS:      Lab 11/08/21 2027   WBC 7.95   HEMOGLOBIN 10.8*   HEMATOCRIT 32.3*   PLATELETS 257   NEUTROS ABS 4.24   IMMATURE GRANS (ABS) 0.02   LYMPHS ABS 2.61   MONOS ABS 0.92*   EOS ABS 0.11   MCV 91.8   LACTATE 0.5         Lab 11/08/21 2027   SODIUM 144   POTASSIUM 4.3   CHLORIDE 108*   CO2 27.0   ANION GAP 9.0   BUN 12   CREATININE 0.71   GLUCOSE 87   CALCIUM 9.1         Lab 11/08/21 2027   TOTAL PROTEIN 7.0   ALBUMIN 4.30   GLOBULIN 2.7   ALT (SGPT) 17   AST (SGOT) 21   BILIRUBIN 0.5   ALK PHOS 50                     UA    Urinalysis 2/10/21 2/10/21 6/12/21    1042 1042    Squamous Epithelial Cells, UA  13-20 (A)    Specific Gravity, UA 1.029     Ketones, UA Trace (A)  Negative   Blood, UA Negative     Leukocytes, UA Small (1+) (A)  Negative   Nitrite, UA Negative     RBC, UA  0-2    WBC, UA  6-12 (A)    Bacteria, UA  2+ (A)    (A) Abnormal value              Microbiology Results (last 10 days)     Procedure Component Value - Date/Time    Blood Culture - Blood, Arm, Left [[id number]]  (Normal) Collected: 11/08/21 2349    Lab Status: Final result Specimen: Blood from Arm, Left Updated: 11/14/21 0015     Blood Culture No growth at 5 days    Blood Culture - Blood, Arm, Right [[id number]]  (Normal) Collected: 11/08/21 2348    Lab Status: Final result Specimen: Blood from Arm, Right Updated: 11/14/21 0015     Blood Culture No growth at 5 days          No radiology results from the last 24 hrs        Assessment/Plan   Assessment & Plan     Active Hospital Problems    Diagnosis  POA   • Facial cellulitis [L03.211]  Yes       27yo with h/o migraines with facial cellulitis refractory  to outpatient treatment    Right Facial Cellulitis  --refractory to outpatient Bactrim  --has hives with pcn so trying to avoid crossreactivity, pharm to dose vanc and aztreonam for now  --ID to see  --stat labs including cultures, CBC, CMP, ESR, CRP  --repeat CT face    Migraines  --prescribed maxalt recently but hasn't taken any yet    DVT prophylaxis:  SCDs, ambulate      CODE STATUS:  FULL Code  Code Status and Medical Interventions:   Ordered at: 11/15/21 1241     Level Of Support Discussed With:    Patient     Code Status (Patient has no pulse and is not breathing):    CPR (Attempt to Resuscitate)     Medical Interventions (Patient has pulse or is breathing):    Full Support       Admission Status:  I believe this patient meets INPATIENT status due to facial cellulitis/failed outpatient treatment.   I feel patient’s risk for adverse outcomes and need for care warrant INPATIENT evaluation and I predict the patient’s care encounter to likely last beyond 2 midnights.      Tyrone Noriega MD  11/15/21

## 2021-11-16 LAB
ANION GAP SERPL CALCULATED.3IONS-SCNC: 11 MMOL/L (ref 5–15)
BUN SERPL-MCNC: 7 MG/DL (ref 6–20)
BUN/CREAT SERPL: 8.5 (ref 7–25)
CALCIUM SPEC-SCNC: 8.9 MG/DL (ref 8.6–10.5)
CHLORIDE SERPL-SCNC: 105 MMOL/L (ref 98–107)
CO2 SERPL-SCNC: 24 MMOL/L (ref 22–29)
CREAT SERPL-MCNC: 0.82 MG/DL (ref 0.57–1)
CRP SERPL-MCNC: <0.3 MG/DL (ref 0–0.5)
DEPRECATED RDW RBC AUTO: 45.1 FL (ref 37–54)
ERYTHROCYTE [DISTWIDTH] IN BLOOD BY AUTOMATED COUNT: 13.2 % (ref 12.3–15.4)
GFR SERPL CREATININE-BSD FRML MDRD: 84 ML/MIN/1.73
GLUCOSE SERPL-MCNC: 77 MG/DL (ref 65–99)
HCT VFR BLD AUTO: 32.1 % (ref 34–46.6)
HGB BLD-MCNC: 10.4 G/DL (ref 12–15.9)
MCH RBC QN AUTO: 30.1 PG (ref 26.6–33)
MCHC RBC AUTO-ENTMCNC: 32.4 G/DL (ref 31.5–35.7)
MCV RBC AUTO: 92.8 FL (ref 79–97)
PLATELET # BLD AUTO: 259 10*3/MM3 (ref 140–450)
PMV BLD AUTO: 10.6 FL (ref 6–12)
POTASSIUM SERPL-SCNC: 4 MMOL/L (ref 3.5–5.2)
PROCALCITONIN SERPL-MCNC: 0.03 NG/ML (ref 0–0.25)
RBC # BLD AUTO: 3.46 10*6/MM3 (ref 3.77–5.28)
SODIUM SERPL-SCNC: 140 MMOL/L (ref 136–145)
WBC # BLD AUTO: 6.22 10*3/MM3 (ref 3.4–10.8)

## 2021-11-16 PROCEDURE — 84145 PROCALCITONIN (PCT): CPT | Performed by: INTERNAL MEDICINE

## 2021-11-16 PROCEDURE — 25010000002 ERTAPENEM PER 500 MG: Performed by: INTERNAL MEDICINE

## 2021-11-16 PROCEDURE — 86140 C-REACTIVE PROTEIN: CPT | Performed by: INTERNAL MEDICINE

## 2021-11-16 PROCEDURE — 25010000002 VANCOMYCIN PER 500 MG: Performed by: INTERNAL MEDICINE

## 2021-11-16 PROCEDURE — 85027 COMPLETE CBC AUTOMATED: CPT | Performed by: INTERNAL MEDICINE

## 2021-11-16 PROCEDURE — 80048 BASIC METABOLIC PNL TOTAL CA: CPT | Performed by: INTERNAL MEDICINE

## 2021-11-16 PROCEDURE — 99232 SBSQ HOSP IP/OBS MODERATE 35: CPT | Performed by: INTERNAL MEDICINE

## 2021-11-16 PROCEDURE — 25010000002 DIPHENHYDRAMINE PER 50 MG: Performed by: PHYSICIAN ASSISTANT

## 2021-11-16 RX ORDER — L.ACID,PARA/B.BIFIDUM/S.THERM 8B CELL
1 CAPSULE ORAL DAILY
Status: DISCONTINUED | OUTPATIENT
Start: 2021-11-16 | End: 2021-11-18 | Stop reason: HOSPADM

## 2021-11-16 RX ORDER — CLINDAMYCIN PHOSPHATE 600 MG/50ML
600 INJECTION INTRAVENOUS EVERY 8 HOURS
Status: COMPLETED | OUTPATIENT
Start: 2021-11-16 | End: 2021-11-18

## 2021-11-16 RX ADMIN — SODIUM CHLORIDE, PRESERVATIVE FREE 10 ML: 5 INJECTION INTRAVENOUS at 20:25

## 2021-11-16 RX ADMIN — SODIUM CHLORIDE, PRESERVATIVE FREE 10 ML: 5 INJECTION INTRAVENOUS at 08:54

## 2021-11-16 RX ADMIN — ERTAPENEM 1 G: 1 INJECTION INTRAMUSCULAR; INTRAVENOUS at 11:37

## 2021-11-16 RX ADMIN — DIPHENHYDRAMINE HYDROCHLORIDE 25 MG: 50 INJECTION INTRAMUSCULAR; INTRAVENOUS at 02:18

## 2021-11-16 RX ADMIN — CLINDAMYCIN IN 5 PERCENT DEXTROSE 600 MG: 12 INJECTION, SOLUTION INTRAVENOUS at 20:25

## 2021-11-16 RX ADMIN — AZTREONAM 1 G: 1 INJECTION, POWDER, LYOPHILIZED, FOR SOLUTION INTRAMUSCULAR; INTRAVENOUS at 04:58

## 2021-11-16 RX ADMIN — CLINDAMYCIN IN 5 PERCENT DEXTROSE 600 MG: 12 INJECTION, SOLUTION INTRAVENOUS at 12:09

## 2021-11-16 RX ADMIN — ACETAMINOPHEN 650 MG: 325 TABLET, FILM COATED ORAL at 21:31

## 2021-11-16 RX ADMIN — SODIUM CHLORIDE 500 ML: 9 INJECTION, SOLUTION INTRAVENOUS at 14:24

## 2021-11-16 RX ADMIN — Medication 1 CAPSULE: at 08:54

## 2021-11-16 RX ADMIN — VANCOMYCIN HYDROCHLORIDE 1000 MG: 1 INJECTION, SOLUTION INTRAVENOUS at 02:18

## 2021-11-16 NOTE — PROGRESS NOTES
[facility] Medicine Services  PROGRESS NOTE    Patient Name: Kelly Humphrey  : 1995  MRN: [id number]    Date of Admission: 11/15/2021  Primary Care Physician: Faye Gramajo MD    Subjective   Subjective     CC:  Facial redness, swelling    HPI:  Face may be a little better but not sure.  Feels ok otw.  She did have red man's syndrome with vanc yesterday but did ok with giving vanc slower and benadryl    ROS:  Gen- No fevers, chills  CV- No chest pain, palpitations  Resp- No cough, dyspnea  GI- No N/V/D, abd pain        Objective   Objective     Vital Signs:   Temp:  [97.3 °F (36.3 °C)-98.5 °F (36.9 °C)] 97.6 °F (36.4 °C)  Heart Rate:  [58-79] 59  Resp:  [18-20] 18  BP: ()/(54-66) 97/62     Physical Exam:  Constitutional: Awake, alert  Eyes: PERRLA, sclerae anicteric, no conjunctival injection  HENT: NCAT, mucous membranes moist  Neck: Supple, no thyromegaly, no lymphadenopathy, trachea midline  Respiratory: Clear to auscultation bilaterally, nonlabored respirations   Cardiovascular: RRR, no murmurs, rubs, or gallops, palpable pedal pulses bilaterally  Gastrointestinal: Positive bowel sounds, soft, nontender, nondistended  Musculoskeletal: No bilateral ankle edema, no clubbing or cyanosis to extremities  Psychiatric: Appropriate affect, cooperative  Neurologic: Oriented x 3, strength symmetric in all extremities, Cranial Nerves grossly intact to confrontation, speech clear  Skin:localized area of erythema and induration/swelling right maxillary area and 2 smaller quarter sized localized areas of circumferential swelling erythema on right forehead    Results Reviewed:  LAB RESULTS:      Lab 21  0533 11/15/21  1413   WBC 6.22 5.70   HEMOGLOBIN 10.4* 10.6*   HEMATOCRIT 32.1* 32.6*   PLATELETS 259 262   NEUTROS ABS  --  2.36   IMMATURE GRANS (ABS)  --  0.01   LYMPHS ABS  --  2.68   MONOS ABS  --  0.49   EOS ABS  --  0.11   MCV 92.8 92.4   SED RATE  --  5   CRP  --   <0.30   LACTATE  --  0.6         Lab 11/15/21  1413   SODIUM 137   POTASSIUM 4.6   CHLORIDE 105   CO2 22.0   ANION GAP 10.0   BUN 8   CREATININE 0.86   GLUCOSE 88   CALCIUM 9.0         Lab 11/15/21  1413   TOTAL PROTEIN 6.8   ALBUMIN 4.30   GLOBULIN 2.5   ALT (SGPT) 13   AST (SGOT) 19   BILIRUBIN 0.4   ALK PHOS 49                     Brief Urine Lab Results  (Last result in the past 365 days)      Color   Clarity   Blood   Leuk Est   Nitrite   Protein   CREAT   Urine HCG        06/12/21 1435 Dark Yellow   Clear   Negative   Negative   Negative   Negative                 Microbiology Results Abnormal     Procedure Component Value - Date/Time    MRSA Screen, PCR (Inpatient) - Swab, Nares [[id number]]  (Normal) Collected: 11/15/21 2052    Lab Status: Final result Specimen: Swab from Nares Updated: 11/15/21 2208     MRSA PCR Negative    Narrative:      MRSA Negative    COVID PRE-OP / PRE-PROCEDURE SCREENING ORDER (NO ISOLATION) - Swab, Nasopharynx [[id number]]  (Normal) Collected: 11/15/21 2052    Lab Status: Final result Specimen: Swab from Nasopharynx Updated: 11/15/21 2115    Narrative:      The following orders were created for panel order COVID PRE-OP / PRE-PROCEDURE SCREENING ORDER (NO ISOLATION) - Swab, Nasopharynx.  Procedure                               Abnormality         Status                     ---------                               -----------         ------                     COVID-19, ABBOTT IN-HOUS...[[id number]]  Normal              Final result                 Please view results for these tests on the individual orders.    COVID-19, ABBOTT IN-HOUSE,NASAL Swab (NO TRANSPORT MEDIA) 2 HR TAT - Swab, Nasopharynx [[id number]]  (Normal) Collected: 11/15/21 2052    Lab Status: Final result Specimen: Swab from Nasopharynx Updated: 11/15/21 2115     COVID19 Presumptive Negative    Narrative:      Fact sheet for providers: https://www.fda.gov/media/439506/download     Fact sheet for patients:  https://www.fda.gov/media/606935/download    Test performed by PCR.  If inconsistent with clinical signs and symptoms patient should be tested with different authorized molecular test.          No radiology results from the last 24 hrs        I have reviewed the medications:  Scheduled Meds:!Vancomycin Level Draw Needed, , Does not apply, Once  aztreonam, 1 g, Intravenous, Q8H  lactobacillus acidophilus, 1 capsule, Oral, Daily  sodium chloride, 10 mL, Intravenous, Q12H  vancomycin, 1,000 mg, Intravenous, Q12H      Continuous Infusions:Pharmacy to dose vancomycin,       PRN Meds:.acetaminophen **OR** acetaminophen **OR** acetaminophen  •  diphenhydrAMINE  •  ondansetron **OR** ondansetron  •  Pharmacy to dose vancomycin  •  sodium chloride    Assessment/Plan   Assessment & Plan     Active Hospital Problems    Diagnosis  POA   • Facial cellulitis [L03.211]  Yes      Resolved Hospital Problems   No resolved problems to display.        Brief Hospital Course to date:  Kelly Humphrey is a 26 y.o. female with h/o migraines with facial cellulitis refractory to outpatient treatment     Right Facial Cellulitis  --refractory to outpatient Bactrim  --has hives with pcn so trying to avoid crossreactivity, pharm to dose vanc and aztreonam day 2  --ID following  --repeat CT face shows cellulitis with skin thickening and subq edema in frontal regions, prenasal soft tissues and premaxillary region but no orbital involvement or signs of abscess     Migraines  --prescribed maxalt recently but hasn't taken any yet    Anemia  --likely normal in young menstruating female, monitor    Hypotension  --likely normal for her as she is thin, nontoxic appearing.  Will bolus 500NS        DVT prophylaxis:  Mechanical DVT prophylaxis orders are present. SCDs, ambulate      AM-PAC 6 Clicks Score (PT): 24 (11/15/21 2000)    Disposition: I expect the patient to be discharged TBD    CODE STATUS: FULL Code  Code Status and Medical Interventions:    Ordered at: 11/15/21 1241     Level Of Support Discussed With:    Patient     Code Status (Patient has no pulse and is not breathing):    CPR (Attempt to Resuscitate)     Medical Interventions (Patient has pulse or is breathing):    Full Support       Tyrone Noriega MD  11/16/21

## 2021-11-16 NOTE — PROGRESS NOTES
Clinical Nutrition   Reason For Visit: Identified at risk by screening criteria, BMI    Patient Name: Kelly Humphrey  YOB: 1995  MRN: [id number]  Date of Encounter: 11/16/21 08:10 EST  Admission date: 11/15/2021      Nutrition Assessment     Admission Problem List:    Facial cellulitis         PMH: She  has a past medical history of ADHD (attention deficit hyperactivity disorder) and Anxiety.   PSxH: She  has a past surgical history that includes Tonsillectomy (2017).        Reported/Observed/Food/Nutrition Related History     11/16) Pt reports good appetite. States that she does not have any food allergies, no issues chewing/swallowing. Reports that she does not have any food preferences at this time.       Anthropometrics   Height: 68 in  Weight: 118 lb per standing scale   BMI: 18.0  BMI classification: Underweight:<18.5kg/m2     Weight change: Pt reports no recent unintentional weight loss or weight changes. EMR weight hx reviewed, appears pt has gained 8 lb over the past 7 months.     Date Weight (kg) Weight (lbs) Weight Method VISIT REPORT   11/15/2021 53.524 kg 118 lb Standing scale Report   11/15/2021 53.524 kg 118 lb - Report   11/8/2021 49.896 kg 110 lb Stated -   11/8/2021 49.896 kg 110 lb Stated -   9/16/2021 51.075 kg 112 lb 9.6 oz - Report   6/12/2021 52.164 kg 115 lb Stated -   4/9/2021 49.896 kg 110 lb Standing scale -   3/23/2021 51.529 kg 113 lb 9.6 oz - Report   2/15/2021 50.621 kg 111 lb 9.6 oz - Report   2/10/2021 49.896 kg 110 lb - -   2/8/2021 49.896 kg 110 lb Stated -   12/7/2020 53.524 kg 118 lb Stated -   7/21/2020 52.254 kg 115 lb 3.2 oz - Report   6/17/2020 52.617 kg 116 lb - Report   6/3/2020 53.524 kg 118 lb - -     Labs reviewed   Labs reviewed: Yes    Results from last 7 days   Lab Units 11/15/21  1413   SODIUM mmol/L 137   POTASSIUM mmol/L 4.6   CHLORIDE mmol/L 105   CO2 mmol/L 22.0   BUN mg/dL 8   CREATININE mg/dL 0.86   GLUCOSE mg/dL 88   CALCIUM mg/dL 9.0      Results from last 7 days   Lab Units 11/16/21  0533 11/15/21  1413   WBC 10*3/mm3 6.22 5.70   ALBUMIN g/dL  --  4.30   CRP mg/dL  --  <0.30         No results found for: HGBA1C  Medications reviewed   Medications reviewed: Yes  Scheduled: antibiotics, risaquad  PRN: benadryl    Current Nutrition Prescription   PO: Diet Regular  Oral Nutrition Supplement: No active supplement orders     Average PO intake: 11/16) No data      Nutrition Diagnosis   11/16  Problem Underweight   Etiology Baseline weight   Signs/Symptoms BMI= 18.0; appears weight at baseline, has gained 8 lb over the past 7 months       Goal:   General: Nutrition support treatment  PO: Establish PO       Intervention   Intervention: Follow treatment progress, Care plan reviewed, Interview for preferences, Encourage intake      Monitoring/Evaluation:   Monitoring/Evaluation: Per protocol, PO intake, Symptoms    Liz Jeffers MS RD/LD CNSC  Time Spent: 20 minutes

## 2021-11-16 NOTE — PLAN OF CARE
Problem: Adult Inpatient Plan of Care  Goal: Plan of Care Review  Outcome: Ongoing, Progressing  Flowsheets (Taken 11/16/2021 1811)  Progress: improving  Plan of Care Reviewed With: patient  Outcome Summary: VSS. No complaints throughout the shift. Pt up ad chelle. Will continue plan of care  Goal: Patient-Specific Goal (Individualized)  Outcome: Ongoing, Progressing  Goal: Absence of Hospital-Acquired Illness or Injury  Outcome: Ongoing, Progressing  Intervention: Identify and Manage Fall Risk  Recent Flowsheet Documentation  Taken 11/16/2021 1800 by Myrna Porter RN  Safety Promotion/Fall Prevention:   activity supervised   assistive device/personal items within reach   clutter free environment maintained   fall prevention program maintained   gait belt   lighting adjusted   mobility aid in reach   nonskid shoes/slippers when out of bed   room organization consistent   safety round/check completed  Taken 11/16/2021 1600 by Myrna Porter RN  Safety Promotion/Fall Prevention:   activity supervised   assistive device/personal items within reach   clutter free environment maintained   fall prevention program maintained   gait belt   lighting adjusted   mobility aid in reach   nonskid shoes/slippers when out of bed   room organization consistent   safety round/check completed  Taken 11/16/2021 1400 by Myrna Porter RN  Safety Promotion/Fall Prevention:   activity supervised   assistive device/personal items within reach   clutter free environment maintained   fall prevention program maintained   gait belt   lighting adjusted   mobility aid in reach   nonskid shoes/slippers when out of bed   safety round/check completed   room organization consistent  Taken 11/16/2021 1200 by Myrna Porter RN  Safety Promotion/Fall Prevention:   activity supervised   assistive device/personal items within reach   clutter free environment maintained   fall prevention program maintained   gait belt   lighting adjusted   mobility aid in  reach   nonskid shoes/slippers when out of bed   room organization consistent   safety round/check completed  Taken 11/16/2021 1000 by Myrna Porter RN  Safety Promotion/Fall Prevention:   activity supervised   assistive device/personal items within reach   clutter free environment maintained   fall prevention program maintained   gait belt   lighting adjusted   mobility aid in reach   nonskid shoes/slippers when out of bed   room organization consistent   safety round/check completed  Taken 11/16/2021 0800 by Myrna Porter RN  Safety Promotion/Fall Prevention:   activity supervised   assistive device/personal items within reach   clutter free environment maintained   fall prevention program maintained   gait belt   lighting adjusted   mobility aid in reach   nonskid shoes/slippers when out of bed   room organization consistent   safety round/check completed  Intervention: Prevent Skin Injury  Recent Flowsheet Documentation  Taken 11/16/2021 1800 by Myrna Porter RN  Body Position: position changed independently  Taken 11/16/2021 1600 by Myrna Porter RN  Body Position: position changed independently  Taken 11/16/2021 1400 by Myrna Porter RN  Body Position: position changed independently  Taken 11/16/2021 1200 by Myrna Porter RN  Body Position: position changed independently  Taken 11/16/2021 1000 by Myrna Porter RN  Body Position: position changed independently  Taken 11/16/2021 0800 by Myrna Porter RN  Body Position: position changed independently  Intervention: Prevent and Manage VTE (venous thromboembolism) Risk  Recent Flowsheet Documentation  Taken 11/16/2021 0800 by Myrna Porter RN  VTE Prevention/Management:   bilateral   dorsiflexion/plantar flexion performed  Intervention: Prevent Infection  Recent Flowsheet Documentation  Taken 11/16/2021 1800 by Myrna Porter RN  Infection Prevention:   hand hygiene promoted   rest/sleep promoted   single patient room provided  Taken 11/16/2021 1600 by  Porter, Myrna, RN  Infection Prevention:   hand hygiene promoted   rest/sleep promoted   single patient room provided  Taken 11/16/2021 1400 by Myrna Porter RN  Infection Prevention:   hand hygiene promoted   rest/sleep promoted   single patient room provided  Taken 11/16/2021 1200 by Myrna Porter RN  Infection Prevention:   hand hygiene promoted   rest/sleep promoted   single patient room provided  Taken 11/16/2021 1000 by Myrna Porter RN  Infection Prevention:   hand hygiene promoted   rest/sleep promoted   single patient room provided  Taken 11/16/2021 0800 by Myran Porter RN  Infection Prevention:   hand hygiene promoted   rest/sleep promoted   single patient room provided  Goal: Optimal Comfort and Wellbeing  Outcome: Ongoing, Progressing  Intervention: Provide Person-Centered Care  Recent Flowsheet Documentation  Taken 11/16/2021 0800 by Myrna Porter RN  Trust Relationship/Rapport:   care explained   choices provided  Goal: Readiness for Transition of Care  Outcome: Ongoing, Progressing     Problem: Skin or Soft Tissue Infection  Goal: Infection Symptom Resolution  Outcome: Ongoing, Progressing  Intervention: Provide Meticulous Infection Site Care  Recent Flowsheet Documentation  Taken 11/16/2021 1800 by Myrna Porter RN  Infection Prevention:   hand hygiene promoted   rest/sleep promoted   single patient room provided  Taken 11/16/2021 1600 by Myrna Porter RN  Infection Prevention:   hand hygiene promoted   rest/sleep promoted   single patient room provided  Taken 11/16/2021 1400 by Myrna Porter RN  Infection Prevention:   hand hygiene promoted   rest/sleep promoted   single patient room provided  Taken 11/16/2021 1200 by Myrna Porter RN  Infection Prevention:   hand hygiene promoted   rest/sleep promoted   single patient room provided  Taken 11/16/2021 1000 by Myrna Porter RN  Infection Prevention:   hand hygiene promoted   rest/sleep promoted   single patient room provided  Taken  11/16/2021 0800 by Myrna Porter, RN  Infection Prevention:   hand hygiene promoted   rest/sleep promoted   single patient room provided   Goal Outcome Evaluation:  Plan of Care Reviewed With: patient        Progress: improving  Outcome Summary: VSS. No complaints throughout the shift. Pt up ad chelle. Will continue plan of care

## 2021-11-16 NOTE — CONSULTS
INFECTIOUS DISEASE CONSULT/INITIAL HOSPITAL VISIT    Kelly Humphrey  1995  [id number]    Date of Consult: 11/15/2021    Admission Date: 11/15/2021      Requesting Provider: Mica Moseley MD  Evaluating Physician: Maci Hinojosa MD    Reason for Consultation: facial cellulitis      HPI:   26 y.o  who developed a pimple on her face 9 days ago. She did not improve with topical retinoid products and presented to the ED 11/8. She had a CT that reportedly showed cellulitis only and was discharged with bactrim. She was prescribed bactrim but did not improve. When she followed up with her pcp today, the decision was made to admit her for parenteral therapy since she had failed oral therapy. She had severe swelling which resulted in her right eyelid being shut. She denies any vision changes currently though had a weird feeling with her vision in her right eye on Saturday whch has now resolved. Her  last fever was 11/9  up to 101.      Past Medical History:   Diagnosis Date   • ADHD (attention deficit hyperactivity disorder)    • Anxiety        Past Surgical History:   Procedure Laterality Date   • TONSILLECTOMY  2017       Family History   Adopted: Yes   Problem Relation Age of Onset   • Kidney disease Other        Social History     Socioeconomic History   • Marital status: Single   Tobacco Use   • Smoking status: Never Smoker   • Smokeless tobacco: Never Used   Vaping Use   • Vaping Use: Never used   Substance and Sexual Activity   • Alcohol use: Yes     Comment: occasional beer, once every 2 weeks   • Drug use: No   • Sexual activity: Yes     Partners: Male     Birth control/protection: OCP, Condom     Comment: single, steady partner       Allergies   Allergen Reactions   • Augmentin [Amoxicillin-Pot Clavulanate] Hives         Medication:    Current Facility-Administered Medications:   •  [START ON 11/16/2021] !Vancomycin Level Draw Needed 11/16 @ 1400 - HOLD 1500 dose until PharmD  evaluates, , Does not apply, Once, Tyrone Noriega MD  •  acetaminophen (TYLENOL) tablet 650 mg, 650 mg, Oral, Q4H PRN **OR** acetaminophen (TYLENOL) 160 MG/5ML solution 650 mg, 650 mg, Oral, Q4H PRN **OR** acetaminophen (TYLENOL) suppository 650 mg, 650 mg, Rectal, Q4H PRN, Tyrone Noriega MD  •  aztreonam (AZACTAM) 1 g/100 mL 0.9% NS IVPB (mbp), 1 g, Intravenous, Q8H, Tyrone Noriega MD  •  ondansetron (ZOFRAN) tablet 4 mg, 4 mg, Oral, Q6H PRN **OR** ondansetron (ZOFRAN) injection 4 mg, 4 mg, Intravenous, Q6H PRN, Tyrone Noriega MD  •  Pharmacy to dose vancomycin, , Does not apply, Continuous PRN, Tyrone Noriega MD  •  sodium chloride 0.9 % flush 10 mL, 10 mL, Intravenous, Q12H, Tyrone Noriega MD  •  sodium chloride 0.9 % flush 10 mL, 10 mL, Intravenous, PRN, Tyrone Noriega MD  •  [START ON 11/16/2021] vancomycin (VANCOCIN) in iso-osmotic dextrose IVPB 1 g (premix) 200 mL, 1,000 mg, Intravenous, Q12H, Tryone Noriega MD    Antibiotics:  Anti-Infectives (From admission, onward)    Ordered     Dose/Rate Route Frequency Start Stop    11/15/21 1556  !Vancomycin Level Draw Needed 11/16 @ 1400 - HOLD 1500 dose until PharmD evaluates        Ordering Provider: Tyrone Noriega MD     Does not apply Once 11/16/21 1400      11/15/21 1258  vancomycin (VANCOCIN) in iso-osmotic dextrose IVPB 1 g (premix) 200 mL        Ordering Provider: Tyrone Noriega MD    1,000 mg  over 60 Minutes Intravenous Every 12 Hours 11/16/21 0300 11/22/21 0259    11/15/21 1229  aztreonam (AZACTAM) 1 g/100 mL 0.9% NS IVPB (mbp)        Ordering Provider: Tyrone Noriega MD    1 g  over 4 Hours Intravenous Every 8 Hours 11/15/21 2000 11/22/21 1959    11/15/21 1234  vancomycin 1250 mg/250 mL 0.9% NS IVPB ([facility])        Ordering Provider: Tyrone Noriega MD    1,250 mg  over 90 Minutes Intravenous Once 11/15/21 1330 11/15/21 1726    11/15/21 1229  aztreonam (AZACTAM) 2 g/100 mL 0.9% NS (mbp)        Ordering Provider: Tyrone Noriega MD    2  g  over 30 Minutes Intravenous Once 11/15/21 1315 11/15/21 1445    11/15/21 1229  Pharmacy to dose vancomycin        Ordering Provider: Tyrone Noriega MD     Does not apply Continuous PRN 11/15/21 1205 21 1204            Review of Systems:  Constitutional-- + Fever, chills & sweats.  Appetite good, and no malaise. No fatigue.  HEENT-- No new vision, hearing or throat complaints.  No epistaxis or oral sores.  Denies odynophagia or dysphagia. No headache, photophobia or neck stiffness.  CV-- No chest pain, palpitation or syncope  Resp-- No SOB/cough/Hemoptysis  GI- No nausea, vomiting, or diarrhea.  No hematochezia, melena, or hematemesis. Denies jaundice or chronic liver disease.  -- No dysuria, hematuria, or flank pain.  Denies hesitancy, urgency or flank pain.  Lymph- no swollen lymph nodes in neck/axilla or groin.   Heme- No active bruising or bleeding; no Hx of DVT or PE.  MS-- no swelling or pain in the bones or joints of arms/legs.  No new back pain.  Neuro-- No acute focal weakness or numbness in the arms or legs.  No seizures.  Skin--No rashes or lesions      Physical Exam:   Vital Signs  Temp (24hrs), Av.1 °F (36.7 °C), Min:97.3 °F (36.3 °C), Max:98.5 °F (36.9 °C)    Temp  Min: 97.3 °F (36.3 °C)  Max: 98.5 °F (36.9 °C)  BP  Min: 91/55  Max: 114/64  Pulse  Min: 58  Max: 79  Resp  Min: 18  Max: 18  SpO2  Min: 98 %  Max: 99 %    GENERAL: Awake and alert, in no acute distress but anxious.   HEENT: Normocephalic, atraumatic.  PERRL. EOMI. No conjunctival injection. No icterus. Oropharynx clear without evidence of thrush or exudate. Acneiform lesions right forehead, 1 x 2 cm area of erythema and induration to the right of the nose     NECK: Supple without nuchal rigidity. No mass.  LYMPH: No cervical, axillary or inguinal lymphadenopathy.  HEART: RRR; No murmur, rubs, gallops.   LUNGS: Clear to auscultation bilaterally without wheezing, rales, rhonchi. Normal respiratory effort. Nonlabored. No  dullness.  ABDOMEN: Soft, nontender, nondistended. Positive bowel sounds. No rebound or guarding. NO mass or HSM.  EXT:  No cyanosis, clubbing or edema. No cord.  :  Without Zamora catheter.  MSK:  No deformity  SKIN: Warm and dry without cutaneous eruptions on Inspection/palpation.    NEURO: Oriented to PPT  Nonfocal, EOMI  PSYCHIATRIC: Normal insight and judgement. Cooperative with PE    Laboratory Data    Results from last 7 days   Lab Units 11/15/21  1413 11/08/21 2027   WBC 10*3/mm3 5.70 7.95   HEMOGLOBIN g/dL 10.6* 10.8*   HEMATOCRIT % 32.6* 32.3*   PLATELETS 10*3/mm3 262 257     Results from last 7 days   Lab Units 11/15/21  1413   SODIUM mmol/L 137   POTASSIUM mmol/L 4.6   CHLORIDE mmol/L 105   CO2 mmol/L 22.0   BUN mg/dL 8   CREATININE mg/dL 0.86   GLUCOSE mg/dL 88   CALCIUM mg/dL 9.0     Results from last 7 days   Lab Units 11/15/21  1413   ALK PHOS U/L 49   BILIRUBIN mg/dL 0.4   ALT (SGPT) U/L 13   AST (SGOT) U/L 19     Results from last 7 days   Lab Units 11/15/21  1413   SED RATE mm/hr 5     Results from last 7 days   Lab Units 11/15/21  1413   CRP mg/dL <0.30     Results from last 7 days   Lab Units 11/15/21  1413   LACTATE mmol/L 0.6             Estimated Creatinine Clearance: 83.7 mL/min (by C-G formula based on SCr of 0.86 mg/dL).      Microbiology:  Blood Culture   Date Value Ref Range Status   11/08/2021 No growth at 5 days  Final     No results found for: BCIDPCR, CXREFLEX, CSFCX, CULTURETIS  No results found for: CULTURES, HSVCX, URCX  No results found for: EYECULTURE, GCCX, HSVCULTURE, LABHSV  No results found for: LEGIONELLA, MRSACX, MUMPSCX, MYCOPLASCX  No results found for: NOCARDIACX, STOOLCX  No results found for: THROATCX, UNSTIMCULT, URINECX, CULTURE, VZVCULTUR  No results found for: VIRALCULTU, WOUNDCX        Radiology:  Imaging Results (Last 72 Hours)     ** No results found for the last 72 hours. **            Impression:       -- Severe facial cellulitis complicating acneiform  lesions  She had a poor or limited response to Bactrim suggesting that a streptococcal infection may have been the pathogen    -- Amoxacillin allergy, cephalosporin status unknown     PLAN/RECOMMENDATIONS:   Thank you for asking us to see Kelly Humphrey, I recommend the following:      -- MRSA nasal pcr     -- continue vancomycin and aztreonam    - agree with repeat CT facial bones    -- probiotic while on antibiotics       Maci Hinojosa MD  11/15/2021  19:25 EST

## 2021-11-16 NOTE — PROGRESS NOTES
[facility] Progress Note    Admission Date: 11/15/2021    Kelly Humphrey  1995  [id number]    Date: 11/16/2021    Antibiotics:  Anti-Infectives (From admission, onward)    Ordered     Dose/Rate Route Frequency Start Stop    11/16/21 1017  clindamycin (CLEOCIN) 600 mg in dextrose 5% 50 mL IVPB (premix)        Ordering Provider: Maci Hinojosa MD    600 mg Intravenous Every 8 Hours 11/16/21 1200 11/18/21 1159    11/16/21 1017  ertapenem (INVanz) 1 g/100 mL 0.9% NS VTB (mbp)        Ordering Provider: Maci Hinojosa MD    1 g  over 30 Minutes Intravenous Every 24 Hours Scheduled 11/16/21 1130 11/24/21 0859    11/15/21 1234  vancomycin 1250 mg/250 mL 0.9% NS IVPB ([facility])        Ordering Provider: Tyrone Noriega MD    1,250 mg  over 90 Minutes Intravenous Once 11/15/21 1330 11/15/21 1726    11/15/21 1229  aztreonam (AZACTAM) 2 g/100 mL 0.9% NS (mbp)        Ordering Provider: Tyrone Noriega MD    2 g  over 30 Minutes Intravenous Once 11/15/21 1315 11/15/21 1445               HPI:   26 y.o  who developed a pimple on her face 9 days ago. She did not improve with topical retinoid products and presented to the ED 11/8. She had a CT that reportedly showed cellulitis only and was discharged with bactrim. She was prescribed bactrim but did not improve. When she followed up with her pcp today, the decision was made to admit her for parenteral therapy since she had failed oral therapy. She had severe swelling which resulted in her right eyelid being shut. She denies any vision changes currently though had a weird feeling with her vision in her right eye on Saturday whch has now resolved. Her  last fever was 11/9  up to 101.    11/16 afebrile, ongoing discomfort from right forehead and maxillary swellings which have not decreased although they are a little less erythematous  CT w/o evidence of orbit involvement                PE:  Vital Signs  Temp  Min: 97.6 °F (36.4 °C)  Max: 98.5 °F (36.9 °C)  BP   Min: 90/54  Max: 118/59  Pulse  Min: 58  Max: 78  Resp  Min: 18  Max: 20  SpO2  Min: 94 %  Max: 99 %  GENERAL: Awake and alert, in no acute distress but anxious.   HEENT: Normocephalic, atraumatic.  PERRL. EOMI. No conjunctival injection. No icterus. Oropharynx clear without evidence of thrush or exudate. Acneiform lesions right forehead, 1 x 2 cm area of erythema and induration to the right of the nose may be a little less erythematous but certainly is not decreased in size  NECK: Supple without nuchal rigidity. No mass.  LYMPH: No cervical, axillary or inguinal lymphadenopathy.  HEART: RRR; No murmur, rubs, gallops.   LUNGS: Clear to auscultation bilaterally without wheezing, rales, rhonchi. Normal respiratory effort. Nonlabored. No dullness.  ABDOMEN: Soft, nontender, nondistended. Positive bowel sounds. No rebound or guarding. NO mass or HSM.  EXT:  No cyanosis, clubbing or edema. No cord.  :  Without Zamora catheter.  MSK:  No deformity  SKIN: Warm and dry without cutaneous eruptions on Inspection/palpation.    NEURO: Oriented to PPT  Nonfocal, EOMI  PSYCHIATRIC: Normal insight and judgement. Cooperative with PE       Laboratory Data    Results from last 7 days   Lab Units 11/16/21  0533 11/15/21  1413   WBC 10*3/mm3 6.22 5.70   HEMOGLOBIN g/dL 10.4* 10.6*   HEMATOCRIT % 32.1* 32.6*   PLATELETS 10*3/mm3 259 262     Results from last 7 days   Lab Units 11/16/21  0533   SODIUM mmol/L 140   POTASSIUM mmol/L 4.0   CHLORIDE mmol/L 105   CO2 mmol/L 24.0   BUN mg/dL 7   CREATININE mg/dL 0.82   GLUCOSE mg/dL 77   CALCIUM mg/dL 8.9     Results from last 7 days   Lab Units 11/15/21  1413   ALK PHOS U/L 49   BILIRUBIN mg/dL 0.4   ALT (SGPT) U/L 13   AST (SGOT) U/L 19     Results from last 7 days   Lab Units 11/15/21  1413   SED RATE mm/hr 5     Results from last 7 days   Lab Units 11/16/21  0533   CRP mg/dL <0.30       Estimated Creatinine Clearance: 87.8 mL/min (by C-G formula based on SCr of 0.82  mg/dL).      Microbiology:  pending    Radiology:  Imaging Results (Last 72 Hours)     Procedure Component Value Units Date/Time    CT Maxillofacial With & Without Contrast [[id number]] Collected: 11/16/21 0907     Updated: 11/16/21 0917    Narrative:      EXAMINATION: CT MAXILLOFACIAL W WO CON-      INDICATION: refractory cellulitis not improving wtih po abx     TECHNIQUE: Axial CT of the face performed without and with IV contrast  with multiplanar reconstruction     The radiation dose reduction device was turned on for each scan per the  ALARA (As Low as Reasonably Achievable) protocol.     COMPARISON: 11/8/2021     FINDINGS: Limited intracranial evaluation demonstrates no acute finding.  The orbits are again normal in appearance without evidence of retroconal  fluid collection or acute inflammatory change. The optic nerves and  extraocular muscles are symmetric. The facial bones demonstrate no  evidence of acute fracture or new lytic osseous process. No significant  odontogenic disease is appreciated. The paranasal sinuses demonstrate no  significant mucosal thickening or fluid opacification to suggest  component of acute sinusitis. The mastoid air cells are clear.  Evaluation of the superficial facial soft tissues demonstrate skin  thickening and subcutaneous edema, similar in appearance to recent  comparison and noted over the left midline/frontal region, right  supraorbital region, prenasal soft tissues and along the right  premaxillary region. There is no new distinct focal fluid collection  concerning for abscess. No new deep or soft tissue extent. Partially  imaged neck demonstrates no distinct suspicious pathologic  lymphadenopathy of the upper cervical chain. Imaged vascular structures  are normal.       Impression:      Overall unchanged CT appearance of the facial soft tissues  with multifocal skin thickening and subcutaneous edema noted along the  frontal regions, prenasal soft tissues and premaxillary  region. There is  no evidence of new orbital involvement, focal fluid collection  concerning for abscess or deeper soft tissue extent.        This report was finalized on 11/16/2021 9:14 AM by Montez Felix.             I personally reviewed the radiographic studies          Impression:         -- Severe facial cellulitis complicating acneiform lesions  She had a poor or limited response to Bactrim suggesting that a streptococcal infection may have been the pathogen  MRSA pcr negative  Her improvement on vanc and aztreonam fas been limited   I think that she would definitely benefit with treatment from either a cephalosporin or carbapenem     -- Amoxacillin allergy, cephalosporin status unknown      PLAN/RECOMMENDATIONS:   Thank you for asking us to see Kelly Humphrey, I recommend the following:        -- MRSA nasal pcr- negative     -- change vancomycin and aztreonam to ertapenem 1g daily  Watch for evidence of an allergic reaction  If she improves on this she could be discharged on outpatient treatment    - repeat CT facial bones and orbit- no change   -- probiotic while on antibiotics    -- will discuss with Plastic surgery    -- Dermatology referral as outpatient     Discussed with CHAVO Hinojosa MD  11/16/2021

## 2021-11-17 LAB
ANION GAP SERPL CALCULATED.3IONS-SCNC: 11 MMOL/L (ref 5–15)
BUN SERPL-MCNC: 9 MG/DL (ref 6–20)
BUN/CREAT SERPL: 13.4 (ref 7–25)
CALCIUM SPEC-SCNC: 9.4 MG/DL (ref 8.6–10.5)
CHLORIDE SERPL-SCNC: 104 MMOL/L (ref 98–107)
CO2 SERPL-SCNC: 25 MMOL/L (ref 22–29)
CREAT SERPL-MCNC: 0.67 MG/DL (ref 0.57–1)
DEPRECATED RDW RBC AUTO: 43.2 FL (ref 37–54)
ERYTHROCYTE [DISTWIDTH] IN BLOOD BY AUTOMATED COUNT: 13.1 % (ref 12.3–15.4)
GFR SERPL CREATININE-BSD FRML MDRD: 106 ML/MIN/1.73
GLUCOSE SERPL-MCNC: 88 MG/DL (ref 65–99)
HCT VFR BLD AUTO: 31.9 % (ref 34–46.6)
HGB BLD-MCNC: 10.6 G/DL (ref 12–15.9)
MCH RBC QN AUTO: 29.9 PG (ref 26.6–33)
MCHC RBC AUTO-ENTMCNC: 33.2 G/DL (ref 31.5–35.7)
MCV RBC AUTO: 90.1 FL (ref 79–97)
PLATELET # BLD AUTO: 263 10*3/MM3 (ref 140–450)
PMV BLD AUTO: 10.2 FL (ref 6–12)
POTASSIUM SERPL-SCNC: 4.4 MMOL/L (ref 3.5–5.2)
RBC # BLD AUTO: 3.54 10*6/MM3 (ref 3.77–5.28)
SODIUM SERPL-SCNC: 140 MMOL/L (ref 136–145)
WBC NRBC COR # BLD: 6.8 10*3/MM3 (ref 3.4–10.8)

## 2021-11-17 PROCEDURE — 80048 BASIC METABOLIC PNL TOTAL CA: CPT | Performed by: INTERNAL MEDICINE

## 2021-11-17 PROCEDURE — 99232 SBSQ HOSP IP/OBS MODERATE 35: CPT | Performed by: INTERNAL MEDICINE

## 2021-11-17 PROCEDURE — 25010000002 ERTAPENEM PER 500 MG: Performed by: INTERNAL MEDICINE

## 2021-11-17 PROCEDURE — 85027 COMPLETE CBC AUTOMATED: CPT | Performed by: INTERNAL MEDICINE

## 2021-11-17 RX ADMIN — CLINDAMYCIN IN 5 PERCENT DEXTROSE 600 MG: 12 INJECTION, SOLUTION INTRAVENOUS at 14:48

## 2021-11-17 RX ADMIN — Medication 1 CAPSULE: at 08:52

## 2021-11-17 RX ADMIN — ERTAPENEM 1 G: 1 INJECTION INTRAMUSCULAR; INTRAVENOUS at 09:45

## 2021-11-17 RX ADMIN — CLINDAMYCIN IN 5 PERCENT DEXTROSE 600 MG: 12 INJECTION, SOLUTION INTRAVENOUS at 20:35

## 2021-11-17 RX ADMIN — CLINDAMYCIN IN 5 PERCENT DEXTROSE 600 MG: 12 INJECTION, SOLUTION INTRAVENOUS at 04:02

## 2021-11-17 RX ADMIN — SODIUM CHLORIDE, PRESERVATIVE FREE 10 ML: 5 INJECTION INTRAVENOUS at 20:37

## 2021-11-17 NOTE — PROGRESS NOTES
[facility] Progress Note    Admission Date: 11/15/2021    Kelly Humphrey  1995  [id number]    Date: 11/17/2021    Antibiotics:  Anti-Infectives (From admission, onward)    Ordered     Dose/Rate Route Frequency Start Stop    11/16/21 1017  clindamycin (CLEOCIN) 600 mg in dextrose 5% 50 mL IVPB (premix)        Ordering Provider: Maci Hinojosa MD    600 mg Intravenous Every 8 Hours 11/16/21 1200 11/18/21 1159    11/16/21 1017  ertapenem (INVanz) 1 g/100 mL 0.9% NS VTB (mbp)        Ordering Provider: Maci Hinojosa MD    1 g  over 30 Minutes Intravenous Every 24 Hours Scheduled 11/16/21 1130 11/24/21 0859    11/15/21 1234  vancomycin 1250 mg/250 mL 0.9% NS IVPB ([facility])        Ordering Provider: Tyrone Noriega MD    1,250 mg  over 90 Minutes Intravenous Once 11/15/21 1330 11/15/21 1726    11/15/21 1229  aztreonam (AZACTAM) 2 g/100 mL 0.9% NS (mbp)        Ordering Provider: Tyrone Noriega MD    2 g  over 30 Minutes Intravenous Once 11/15/21 1315 11/15/21 1445               HPI:   26 y.o  who developed a pimple on her face 9 days ago. She did not improve with topical retinoid products and presented to the ED 11/8. She had a CT that reportedly showed cellulitis only and was discharged with bactrim. She was prescribed bactrim but did not improve. When she followed up with her pcp today, the decision was made to admit her for parenteral therapy since she had failed oral therapy. She had severe swelling which resulted in her right eyelid being shut. She denies any vision changes currently though had a weird feeling with her vision in her right eye on Saturday whch has now resolved. Her  last fever was 11/9  up to 101.      11/16 afebrile, ongoing discomfort from right forehead and maxillary swellings which have not decreased although they are a little less erythematous  CT w/o evidence of orbit involvement  11/17 afebrile, tolerating invanz, induration at forehead and cheek slightly improved,  still with significant discomfort                PE:  Vital Signs  Temp  Min: 97.5 °F (36.4 °C)  Max: 99 °F (37.2 °C)  BP  Min: 87/56  Max: 99/61  Pulse  Min: 50  Max: 81  Resp  Min: 16  Max: 20  SpO2  Min: 93 %  Max: 100 %  GENERAL: Awake and alert, very pleasant . in no acute distress but anxious.   HEENT: Normocephalic, atraumatic.  PERRL. EOMI. No conjunctival injection. No icterus. Oropharynx clear without evidence of thrush or exudate. Acneiform lesions right forehead, 1 x 2 cm area of erythema and induration to the right of the nose may be a little less erythematous but certainly is not decreased in size  NECK: Supple without nuchal rigidity. No mass.  LYMPH: No cervical, axillary or inguinal lymphadenopathy.  HEART: RRR; No murmur, rubs, gallops.   LUNGS: Clear to auscultation bilaterally without wheezing, rales, rhonchi. Normal respiratory effort. Nonlabored. No dullness.  ABDOMEN: Soft, nontender, nondistended. Positive bowel sounds. No rebound or guarding. NO mass or HSM.  EXT:  No cyanosis, clubbing or edema. No cord.  :  Without Zamora catheter.  MSK:  No deformity  SKIN: Warm and dry without cutaneous eruptions on Inspection/palpation.    NEURO: Oriented to PPT  Nonfocal, EOMI  PSYCHIATRIC: Normal insight and judgement. Cooperative with PE       Laboratory Data    Results from last 7 days   Lab Units 11/16/21  0533 11/15/21  1413   WBC 10*3/mm3 6.22 5.70   HEMOGLOBIN g/dL 10.4* 10.6*   HEMATOCRIT % 32.1* 32.6*   PLATELETS 10*3/mm3 259 262     Results from last 7 days   Lab Units 11/16/21  0533   SODIUM mmol/L 140   POTASSIUM mmol/L 4.0   CHLORIDE mmol/L 105   CO2 mmol/L 24.0   BUN mg/dL 7   CREATININE mg/dL 0.82   GLUCOSE mg/dL 77   CALCIUM mg/dL 8.9     Results from last 7 days   Lab Units 11/15/21  1413   ALK PHOS U/L 49   BILIRUBIN mg/dL 0.4   ALT (SGPT) U/L 13   AST (SGOT) U/L 19     Results from last 7 days   Lab Units 11/15/21  1413   SED RATE mm/hr 5     Results from last 7 days   Lab Units  11/16/21  0533   CRP mg/dL <0.30       Estimated Creatinine Clearance: 87.8 mL/min (by C-G formula based on SCr of 0.82 mg/dL).      Microbiology:  pending    Radiology:  Imaging Results (Last 72 Hours)     Procedure Component Value Units Date/Time    CT Maxillofacial With & Without Contrast [[id number]] Collected: 11/16/21 0907     Updated: 11/16/21 0917    Narrative:      EXAMINATION: CT MAXILLOFACIAL W WO CON-      INDICATION: refractory cellulitis not improving wtih po abx     TECHNIQUE: Axial CT of the face performed without and with IV contrast  with multiplanar reconstruction     The radiation dose reduction device was turned on for each scan per the  ALARA (As Low as Reasonably Achievable) protocol.     COMPARISON: 11/8/2021     FINDINGS: Limited intracranial evaluation demonstrates no acute finding.  The orbits are again normal in appearance without evidence of retroconal  fluid collection or acute inflammatory change. The optic nerves and  extraocular muscles are symmetric. The facial bones demonstrate no  evidence of acute fracture or new lytic osseous process. No significant  odontogenic disease is appreciated. The paranasal sinuses demonstrate no  significant mucosal thickening or fluid opacification to suggest  component of acute sinusitis. The mastoid air cells are clear.  Evaluation of the superficial facial soft tissues demonstrate skin  thickening and subcutaneous edema, similar in appearance to recent  comparison and noted over the left midline/frontal region, right  supraorbital region, prenasal soft tissues and along the right  premaxillary region. There is no new distinct focal fluid collection  concerning for abscess. No new deep or soft tissue extent. Partially  imaged neck demonstrates no distinct suspicious pathologic  lymphadenopathy of the upper cervical chain. Imaged vascular structures  are normal.       Impression:      Overall unchanged CT appearance of the facial soft tissues  with  multifocal skin thickening and subcutaneous edema noted along the  frontal regions, prenasal soft tissues and premaxillary region. There is  no evidence of new orbital involvement, focal fluid collection  concerning for abscess or deeper soft tissue extent.        This report was finalized on 11/16/2021 9:14 AM by Montez Felix.             I personally reviewed the radiographic studies          Impression:         -- Severe facial cellulitis complicating acneiform lesions  She had a poor or limited response to Bactrim suggesting that a streptococcal infection may have been the pathogen  MRSA pcr negative  She has had some further improvement on invanz  The fluid collection appears to be better circumscribed     -- Amoxacillin allergy, cephalosporin status unknown  Tolerating Invanz     PLAN/RECOMMENDATIONS:   Thank you for asking us to see Kelly Humphrey, I recommend the following:        -- MRSA nasal pcr- negative     --  ertapenem 1g daily  Watch for evidence of an allergic reaction  If she improves on this she could be discharged on outpatient treatment    -- short course parenteral clindamycin for Eagle effect  After the clindamycin is completed I will consider adding doxycycline or minocycline for the antiinflammatory effect    - repeat CT facial bones and orbit- no change     -- probiotic while on antibiotics Reviewed cdiff precautions    -- Discussed with Dr Bernardo Posada who will see tomorrow re: possible drainage    -- Dermatology referral as outpatient     Discussed with CHAVO Hinojosa MD  11/17/2021

## 2021-11-17 NOTE — PROGRESS NOTES
[facility] Medicine Services  PROGRESS NOTE    Patient Name: Kelly Humphrey  : 1995  MRN: [id number]    Date of Admission: 11/15/2021  Primary Care Physician: Faye Gramajo MD    Subjective   Subjective     CC:  Facial redness, swelling    HPI:  Some improvement today since changing abx.     ROS:  Gen- No fevers, chills  CV- No chest pain, palpitations  Resp- No cough, dyspnea  GI- No N/V/D, abd pain        Objective   Objective     Vital Signs:   Temp:  [97.5 °F (36.4 °C)-99 °F (37.2 °C)] 97.5 °F (36.4 °C)  Heart Rate:  [50-74] 50  Resp:  [16-20] 16  BP: ()/(50-66) 87/56     Physical Exam:  Constitutional: Awake, alert  Eyes: PERRLA, sclerae anicteric, no conjunctival injection  HENT: NCAT, mucous membranes moist  Neck: Supple, no thyromegaly, no lymphadenopathy, trachea midline  Respiratory: Clear to auscultation bilaterally, nonlabored respirations   Cardiovascular: RRR, no murmurs, rubs, or gallops, palpable pedal pulses bilaterally  Gastrointestinal: Positive bowel sounds, soft, nontender, nondistended  Musculoskeletal: No bilateral ankle edema, no clubbing or cyanosis to extremities  Psychiatric: Appropriate affect, cooperative  Neurologic: Oriented x 3, strength symmetric in all extremities, Cranial Nerves grossly intact to confrontation, speech clear  Skin:localized area of erythema and induration/swelling right maxillary area a little smaller today/some imrpovement and 2 smaller quarter sized localized areas of circumferential swelling erythema on right forehead    Results Reviewed:  LAB RESULTS:      Lab 11/16/21  0533 11/15/21  1413   WBC 6.22 5.70   HEMOGLOBIN 10.4* 10.6*   HEMATOCRIT 32.1* 32.6*   PLATELETS 259 262   NEUTROS ABS  --  2.36   IMMATURE GRANS (ABS)  --  0.01   LYMPHS ABS  --  2.68   MONOS ABS  --  0.49   EOS ABS  --  0.11   MCV 92.8 92.4   SED RATE  --  5   CRP <0.30 <0.30   PROCALCITONIN 0.03  --    LACTATE  --  0.6         Lab 2133  11/15/21  1413   SODIUM 140 137   POTASSIUM 4.0 4.6   CHLORIDE 105 105   CO2 24.0 22.0   ANION GAP 11.0 10.0   BUN 7 8   CREATININE 0.82 0.86   GLUCOSE 77 88   CALCIUM 8.9 9.0         Lab 11/15/21  1413   TOTAL PROTEIN 6.8   ALBUMIN 4.30   GLOBULIN 2.5   ALT (SGPT) 13   AST (SGOT) 19   BILIRUBIN 0.4   ALK PHOS 49                     Brief Urine Lab Results  (Last result in the past 365 days)      Color   Clarity   Blood   Leuk Est   Nitrite   Protein   CREAT   Urine HCG        06/12/21 1435 Dark Yellow   Clear   Negative   Negative   Negative   Negative                 Microbiology Results Abnormal     Procedure Component Value - Date/Time    Blood Culture - Blood, Hand, Right [[id number]]  (Normal) Collected: 11/15/21 1413    Lab Status: Preliminary result Specimen: Blood from Hand, Right Updated: 11/16/21 1502     Blood Culture No growth at 24 hours    Blood Culture - Blood, Arm, Right [[id number]]  (Normal) Collected: 11/15/21 1413    Lab Status: Preliminary result Specimen: Blood from Arm, Right Updated: 11/16/21 1502     Blood Culture No growth at 24 hours    MRSA Screen, PCR (Inpatient) - Swab, Nares [[id number]]  (Normal) Collected: 11/15/21 2052    Lab Status: Final result Specimen: Swab from Nares Updated: 11/15/21 2208     MRSA PCR Negative    Narrative:      MRSA Negative    COVID PRE-OP / PRE-PROCEDURE SCREENING ORDER (NO ISOLATION) - Swab, Nasopharynx [[id number]]  (Normal) Collected: 11/15/21 2052    Lab Status: Final result Specimen: Swab from Nasopharynx Updated: 11/15/21 2115    Narrative:      The following orders were created for panel order COVID PRE-OP / PRE-PROCEDURE SCREENING ORDER (NO ISOLATION) - Swab, Nasopharynx.  Procedure                               Abnormality         Status                     ---------                               -----------         ------                     COVID-19, ABBOTT IN-HOUS...[[id number]]  Normal              Final result                 Please view results  for these tests on the individual orders.    COVID-19, ABBOTT IN-HOUSE,NASAL Swab (NO TRANSPORT MEDIA) 2 HR TAT - Swab, Nasopharynx [[id number]]  (Normal) Collected: 11/15/21 2052    Lab Status: Final result Specimen: Swab from Nasopharynx Updated: 11/15/21 2115     COVID19 Presumptive Negative    Narrative:      Fact sheet for providers: https://www.fda.gov/media/362885/download     Fact sheet for patients: https://www.fda.gov/media/499596/download    Test performed by PCR.  If inconsistent with clinical signs and symptoms patient should be tested with different authorized molecular test.          CT Maxillofacial With & Without Contrast    Result Date: 11/16/2021  EXAMINATION: CT MAXILLOFACIAL W WO CON-  INDICATION: refractory cellulitis not improving wtih po abx  TECHNIQUE: Axial CT of the face performed without and with IV contrast with multiplanar reconstruction  The radiation dose reduction device was turned on for each scan per the ALARA (As Low as Reasonably Achievable) protocol.  COMPARISON: 11/8/2021  FINDINGS: Limited intracranial evaluation demonstrates no acute finding. The orbits are again normal in appearance without evidence of retroconal fluid collection or acute inflammatory change. The optic nerves and extraocular muscles are symmetric. The facial bones demonstrate no evidence of acute fracture or new lytic osseous process. No significant odontogenic disease is appreciated. The paranasal sinuses demonstrate no significant mucosal thickening or fluid opacification to suggest component of acute sinusitis. The mastoid air cells are clear. Evaluation of the superficial facial soft tissues demonstrate skin thickening and subcutaneous edema, similar in appearance to recent comparison and noted over the left midline/frontal region, right supraorbital region, prenasal soft tissues and along the right premaxillary region. There is no new distinct focal fluid collection concerning for abscess. No new deep or  soft tissue extent. Partially imaged neck demonstrates no distinct suspicious pathologic lymphadenopathy of the upper cervical chain. Imaged vascular structures are normal.      Impression: Overall unchanged CT appearance of the facial soft tissues with multifocal skin thickening and subcutaneous edema noted along the frontal regions, prenasal soft tissues and premaxillary region. There is no evidence of new orbital involvement, focal fluid collection concerning for abscess or deeper soft tissue extent.   This report was finalized on 11/16/2021 9:14 AM by Montez Felix.            I have reviewed the medications:  Scheduled Meds:clindamycin, 600 mg, Intravenous, Q8H  ertapenem, 1 g, Intravenous, Q24H  lactobacillus acidophilus, 1 capsule, Oral, Daily  sodium chloride, 10 mL, Intravenous, Q12H      Continuous Infusions:   PRN Meds:.•  acetaminophen **OR** acetaminophen **OR** acetaminophen  •  diphenhydrAMINE  •  ondansetron **OR** ondansetron  •  sodium chloride    Assessment/Plan   Assessment & Plan     Active Hospital Problems    Diagnosis  POA   • Facial cellulitis [L03.211]  Yes      Resolved Hospital Problems   No resolved problems to display.        Brief Hospital Course to date:  Kelly Humphrey is a 26 y.o. female with h/o migraines with facial cellulitis refractory to outpatient treatment     Right Facial Cellulitis complicating anceiform lesions  --refractory to outpatient Bactrim  --has hives with pcn not much response to vanc and aztreonam, MRSA pcr negative  --ID changed abx to ertapenem 1g daily with some improvement today with abx change.   Probiotic while on abx  --repeat CT face shows cellulitis with skin thickening and subq edema in frontal regions, prenasal soft tissues and premaxillary region but no orbital involvement or signs of abscess  --blood cultures no growth day 5 and at 24 hours  --Dr Posada with plastics to see tomorrow for ?of possible drainage  --needs to see derm  outpatient     Migraines  --prescribed maxalt recently but hasn't taken any yet    Anemia  --likely normal in young menstruating female, monitor    Hypotension  --likely normal for her as she is thin, nontoxic appearing.          DVT prophylaxis:  Mechanical DVT prophylaxis orders are present. SCDs, ambulate      AM-PAC 6 Clicks Score (PT): 24 (11/16/21 2000)    Disposition: I expect the patient to be discharged TBD    CODE STATUS: FULL Code  Code Status and Medical Interventions:   Ordered at: 11/15/21 1241     Level Of Support Discussed With:    Patient     Code Status (Patient has no pulse and is not breathing):    CPR (Attempt to Resuscitate)     Medical Interventions (Patient has pulse or is breathing):    Full Support       Tyrone Noriega MD  11/17/21

## 2021-11-17 NOTE — PLAN OF CARE
Problem: Adult Inpatient Plan of Care  Goal: Plan of Care Review  Outcome: Ongoing, Progressing  Flowsheets (Taken 11/17/2021 1721)  Plan of Care Reviewed With: patient  Outcome Summary: PT VSS, denies pain, denies nausea. Tolerating diet. IV abx. Visited by mother today. Per Hospitalist note, Dr. Posada w/ Plastics to evaluate tomorrow.  Goal: Patient-Specific Goal (Individualized)  Outcome: Ongoing, Progressing  Goal: Absence of Hospital-Acquired Illness or Injury  Outcome: Ongoing, Progressing  Intervention: Identify and Manage Fall Risk  Recent Flowsheet Documentation  Taken 11/17/2021 1600 by Roselyn Hart RN  Safety Promotion/Fall Prevention:   activity supervised   fall prevention program maintained   nonskid shoes/slippers when out of bed   safety round/check completed  Taken 11/17/2021 1400 by Roselyn Hart RN  Safety Promotion/Fall Prevention:   activity supervised   fall prevention program maintained   nonskid shoes/slippers when out of bed   safety round/check completed  Taken 11/17/2021 1200 by Roselyn Hart RN  Safety Promotion/Fall Prevention:   activity supervised   fall prevention program maintained   nonskid shoes/slippers when out of bed   safety round/check completed  Taken 11/17/2021 1000 by Roselyn Hart RN  Safety Promotion/Fall Prevention:   activity supervised   fall prevention program maintained   nonskid shoes/slippers when out of bed   safety round/check completed  Taken 11/17/2021 0800 by Roselyn Hart RN  Safety Promotion/Fall Prevention:   activity supervised   fall prevention program maintained   nonskid shoes/slippers when out of bed   safety round/check completed  Intervention: Prevent Skin Injury  Recent Flowsheet Documentation  Taken 11/17/2021 1600 by Roselyn Hart RN  Body Position: sitting up in bed  Taken 11/17/2021 1400 by Roselyn Hart RN  Body Position: sitting up in bed  Taken 11/17/2021 1200 by Roselyn Hart RN  Body Position: sitting up in bed  Taken 11/17/2021  1000 by Roselyn Hart RN  Body Position: sitting up in bed  Taken 11/17/2021 0800 by Roselyn Hart RN  Body Position: sitting up in bed  Goal: Optimal Comfort and Wellbeing  Outcome: Ongoing, Progressing  Intervention: Provide Person-Centered Care  Recent Flowsheet Documentation  Taken 11/17/2021 0800 by Roselyn Hart RN  Trust Relationship/Rapport:   care explained   thoughts/feelings acknowledged  Goal: Readiness for Transition of Care  Outcome: Ongoing, Progressing   Goal Outcome Evaluation:  Plan of Care Reviewed With: patient           Outcome Summary: PT VSS, denies pain, denies nausea. Tolerating diet. IV abx. Visited by mother today. Per Hospitalist note, Dr. Posada w/ Plastics to evaluate tomorrow.

## 2021-11-17 NOTE — CASE MANAGEMENT/SOCIAL WORK
Continued Stay Note  [facility]     Patient Name: Kelly Humphrey  MRN: [id number]  Today's Date: 11/17/2021    Admit Date: 11/15/2021     Discharge Plan     Row Name 11/17/21 1535       Plan    Plan Ongoing    Plan Comments CM continues to follow for discharge needs/plans. ID following and has a plastics referral. May need to be discharged on IV antibiotics. Tessa @ [id number]    Final Discharge Disposition Code 01 - home or self-care               Discharge Codes    No documentation.                     Hazel Kenney RN

## 2021-11-18 ENCOUNTER — READMISSION MANAGEMENT (OUTPATIENT)
Dept: CALL CENTER | Facility: HOSPITAL | Age: 26
End: 2021-11-18

## 2021-11-18 VITALS
TEMPERATURE: 98.7 F | DIASTOLIC BLOOD PRESSURE: 84 MMHG | HEART RATE: 60 BPM | OXYGEN SATURATION: 98 % | WEIGHT: 118 LBS | BODY MASS INDEX: 17.88 KG/M2 | HEIGHT: 68 IN | RESPIRATION RATE: 18 BRPM | SYSTOLIC BLOOD PRESSURE: 106 MMHG

## 2021-11-18 PROCEDURE — 87070 CULTURE OTHR SPECIMN AEROBIC: CPT | Performed by: HOSPITALIST

## 2021-11-18 PROCEDURE — 0J910ZZ DRAINAGE OF FACE SUBCUTANEOUS TISSUE AND FASCIA, OPEN APPROACH: ICD-10-PCS | Performed by: PLASTIC SURGERY

## 2021-11-18 PROCEDURE — 99239 HOSP IP/OBS DSCHRG MGMT >30: CPT | Performed by: HOSPITALIST

## 2021-11-18 PROCEDURE — 87205 SMEAR GRAM STAIN: CPT | Performed by: HOSPITALIST

## 2021-11-18 PROCEDURE — 25010000002 ERTAPENEM PER 500 MG: Performed by: INTERNAL MEDICINE

## 2021-11-18 RX ORDER — L.ACID,PARA/B.BIFIDUM/S.THERM 8B CELL
1 CAPSULE ORAL DAILY
Qty: 30 CAPSULE | Refills: 0 | Status: SHIPPED | OUTPATIENT
Start: 2021-11-19 | End: 2021-12-19

## 2021-11-18 RX ORDER — LIDOCAINE HYDROCHLORIDE AND EPINEPHRINE 10; 10 MG/ML; UG/ML
20 INJECTION, SOLUTION INFILTRATION; PERINEURAL ONCE
Status: DISCONTINUED | OUTPATIENT
Start: 2021-11-18 | End: 2021-11-18 | Stop reason: HOSPADM

## 2021-11-18 RX ADMIN — SODIUM CHLORIDE 1000 ML: 9 INJECTION, SOLUTION INTRAVENOUS at 11:23

## 2021-11-18 RX ADMIN — ERTAPENEM 1 G: 1 INJECTION INTRAMUSCULAR; INTRAVENOUS at 08:30

## 2021-11-18 RX ADMIN — ACETAMINOPHEN 650 MG: 325 TABLET, FILM COATED ORAL at 17:17

## 2021-11-18 RX ADMIN — CLINDAMYCIN IN 5 PERCENT DEXTROSE 600 MG: 12 INJECTION, SOLUTION INTRAVENOUS at 04:51

## 2021-11-18 RX ADMIN — Medication 1 CAPSULE: at 08:29

## 2021-11-18 NOTE — PROCEDURES
Preoperative diagnosis: 1.  Right facial abscess    Postoperative diagnosis: 1.  Right facial abscess    Surgeon: Merlin Posada MD    Anesthesia: 1% lidocaine with 1-100,000 epinephrine    Procedure: 1.  Incision and drainage right face    Indication: The patient is a 26-year-old white female with a right facial abscess.  I recommended incision and drainage.  The technique potential complications and typical postoperative course were discussed with the patient.  She indicated her understanding wish to proceed.    Findings: 1.  Right facial subcutaneous abscess    Description: The area on her right face was infiltrated with 1% lidocaine with 1-100,000 epinephrine.  Her face was prepped and draped in the usual sterile fashion.  After performing a timeout, a 15 blade was used to incise her right face over the most fluctuant area oriented along the relaxed skin tension lines.  Purulent drainage was noted to be emanating once a full-thickness incision had been made.  This was cultured.  The area was probed with a hemostat and did not track in any direction.  It was then irrigated with 10 cc of saline.  It was then packed with Nu Gauze.  A dressing was applied.    Estimated blood loss: Minimal    Drains: None    Complications: None immediate

## 2021-11-18 NOTE — CONSULTS
Requesting provider: Maci Hinojosa MD    Reason for consultation: Facial abscess    HPI: The patient is a 26-year-old  who presented on 11 8 with facial cellulitis.  She was sent home after a CT scan did not show an abscess on Bactrim.  She failed oral antibiotics and represented with worsening symptoms.  She was admitted to the hospital with IV antibiotics.  Infectious disease was consulted and noted a fluctuant area on her right face.  I have been asked to evaluate.    Past medical history: 1.  ADHD  2.  Anxiety    Past surgical history: 1.  Tonsillectomy    Medications: None    Allergies: Augmentin which causes hives    Family history: Noncontributory    Social history: Non-smoker    Physical exam: The patient is awake alert and oriented x3.  She is in no acute distress.  She has acne pimples on her face.  She has a fluctuant area with erythema and tenderness on her right malar region.    Assessment: Right facial abscess    Plan: Incision and drainage

## 2021-11-18 NOTE — PLAN OF CARE
Problem: Adult Inpatient Plan of Care  Goal: Plan of Care Review  Outcome: Met  Flowsheets (Taken 11/18/2021 1731)  Progress: improving  Plan of Care Reviewed With: patient  Outcome Summary: Pt Dr Albino GILLESPIE with Plastics performed an I and D at bedside. Wound culture sent. Pt to follow up with ID office tomorrow. Pt aware. RN educated pt on 2x daily wet to dry dressing changed. Pt able to repeat/ teachback. Pt provied with 1/4 in packing strip, sterile swabs for packing, sterile NS, and gloves, and 2x2 gauze.  Pt to be discharged  Goal: Patient-Specific Goal (Individualized)  Outcome: Met  Goal: Absence of Hospital-Acquired Illness or Injury  Outcome: Met  Intervention: Identify and Manage Fall Risk  Recent Flowsheet Documentation  Taken 11/18/2021 1400 by Roselyn Hart RN  Safety Promotion/Fall Prevention:   activity supervised   fall prevention program maintained   nonskid shoes/slippers when out of bed   safety round/check completed  Taken 11/18/2021 1200 by Roselyn Hart RN  Safety Promotion/Fall Prevention:   activity supervised   fall prevention program maintained   nonskid shoes/slippers when out of bed   safety round/check completed  Intervention: Prevent Skin Injury  Recent Flowsheet Documentation  Taken 11/18/2021 1600 by Roselyn Hart RN  Body Position: supine  Taken 11/18/2021 1400 by Roselyn Hart RN  Body Position: sitting up in bed  Taken 11/18/2021 1200 by Roselyn Hart RN  Body Position: sitting up in bed  Goal: Optimal Comfort and Wellbeing  Outcome: Met  Goal: Readiness for Transition of Care  Outcome: Met   Goal Outcome Evaluation:  Plan of Care Reviewed With: patient        Progress: improving  Outcome Summary: Pt Dr Albino GILLESPIE with Plastics performed an I and D at bedside. Wound culture sent. Pt to follow up with ID office tomorrow. Pt aware. RN educated pt on 2x daily wet to dry dressing changed. Pt able to repeat/ teachback. Pt provied with 1/4 in packing strip, sterile swabs for  packing, sterile NS, and gloves, and 2x2 gauze.  Pt to be discharged

## 2021-11-18 NOTE — DISCHARGE SUMMARY
[facility] Medicine Services  DISCHARGE SUMMARY    Patient Name: Kelly Humphrey  : 1995  MRN: [id number]    Date of Admission: 11/15/2021 11:07 AM  Date of Discharge:  2021 (Thursday)  Primary Care Physician: Faye Gramajo MD    Consults     Date and Time Order Name Status Description    2021 12:33 AM Inpatient Plastic Surgery Consult      11/15/2021 12:29 PM Inpatient Infectious Diseases Consult Completed         Merlin Posada MD - Plastic Surgery  Maci Hinojosa MD - Infectious Diseases    Hospital Course     Presenting Problem:   Facial cellulitis [L03.211]    Active Hospital Problems    Diagnosis  POA   • Facial cellulitis [L03.211]  Yes      Resolved Hospital Problems   No resolved problems to display.      Right Facial Abscess  hypotension    Hospital Course:  Kelly Humphrey is a 26 y.o. female with history of acne, migraines who presented as a direct admission from Primary Care Doctor for facial cellulitis and failure of outpatient oral antimicrobial regimen.    She was seen by ID and initially on Vanc and Aztreonam.  MRSA nasal pcr - negative and she was changed to ertapenem 1 g daily.  Repeat of CT facial bones and orbid no change and was placed on probiotics.    She received short course of IV clindamycin for Eagle effect.  Dr. Posada was consulted today for possible incision and drainage.      Due to Hypotension today, she received a bolus of NS - 1,000 mL    She was seen by Plastic Surgery and had a bedside I&D of Right Facial Abscess with Dr. Posada around 4:30 pm today    Discharge Follow Up Recommendations for outpatient labs/diagnostics:   follow wound culture from bedside procedure today - follow up in ID offices for daily infusion of IV abx    Day of Discharge     HPI:    Wanting to go home after being seen by ID/Plastic Surgery.  She is hypotensive today and typically does not have low blood pressure.  She is on no anti-hypertensive  medications at baseline    Review of Systems    Gen- No fevers, chills  CV- No chest pain, palpitations  Resp- No cough, dyspnea  GI- No N/V/D, abd pain    Vital Signs:   Temp:  [97.7 °F (36.5 °C)-98.9 °F (37.2 °C)] 98.7 °F (37.1 °C)  Heart Rate:  [54-85] 60  Resp:  [16-18] 18  BP: ()/(57-84) 106/84     Physical Exam:    Constitutional: No acute distress, awake, alert  HENT: NCAT, acneiform lesions in mid forehead and Right cheek region to the Right of nose  Respiratory: Clear to auscultation bilaterally, respiratory effort normal   Cardiovascular: RRR, no murmurs, rubs, or gallops  Gastrointestinal: Positive bowel sounds, soft, nontender, nondistended  Musculoskeletal: No bilateral ankle edema  Psychiatric: Appropriate affect, cooperative  Neurologic: Oriented x 3, strength symmetric in all extremities, Cranial Nerves grossly intact to confrontation, speech clear  Skin: dry, + skin tenting, skin changes of acne    Pertinent  and/or Most Recent Results     LAB RESULTS:      Lab 11/17/21  1152 11/16/21  0533 11/15/21  1413   WBC 6.80 6.22 5.70   HEMOGLOBIN 10.6* 10.4* 10.6*   HEMATOCRIT 31.9* 32.1* 32.6*   PLATELETS 263 259 262   NEUTROS ABS  --   --  2.36   IMMATURE GRANS (ABS)  --   --  0.01   LYMPHS ABS  --   --  2.68   MONOS ABS  --   --  0.49   EOS ABS  --   --  0.11   MCV 90.1 92.8 92.4   SED RATE  --   --  5   CRP  --  <0.30 <0.30   PROCALCITONIN  --  0.03  --    LACTATE  --   --  0.6         Lab 11/17/21  1152 11/16/21  0533 11/15/21  1413   SODIUM 140 140 137   POTASSIUM 4.4 4.0 4.6   CHLORIDE 104 105 105   CO2 25.0 24.0 22.0   ANION GAP 11.0 11.0 10.0   BUN 9 7 8   CREATININE 0.67 0.82 0.86   GLUCOSE 88 77 88   CALCIUM 9.4 8.9 9.0         Lab 11/15/21  1413   TOTAL PROTEIN 6.8   ALBUMIN 4.30   GLOBULIN 2.5   ALT (SGPT) 13   AST (SGOT) 19   BILIRUBIN 0.4   ALK PHOS 49                     Brief Urine Lab Results  (Last result in the past 365 days)      Color   Clarity   Blood   Leuk Est   Nitrite    Protein   CREAT   Urine HCG        06/12/21 1435 Dark Yellow   Clear   Negative   Negative   Negative   Negative               Microbiology Results (last 10 days)     Procedure Component Value - Date/Time    MRSA Screen, PCR (Inpatient) - Swab, Nares [[id number]]  (Normal) Collected: 11/15/21 2052    Lab Status: Final result Specimen: Swab from Nares Updated: 11/15/21 2208     MRSA PCR Negative    Narrative:      MRSA Negative    COVID PRE-OP / PRE-PROCEDURE SCREENING ORDER (NO ISOLATION) - Swab, Nasopharynx [[id number]]  (Normal) Collected: 11/15/21 2052    Lab Status: Final result Specimen: Swab from Nasopharynx Updated: 11/15/21 2115    Narrative:      The following orders were created for panel order COVID PRE-OP / PRE-PROCEDURE SCREENING ORDER (NO ISOLATION) - Swab, Nasopharynx.  Procedure                               Abnormality         Status                     ---------                               -----------         ------                     COVID-19, ABBOTT IN-HOUS...[[id number]]  Normal              Final result                 Please view results for these tests on the individual orders.    COVID-19, ABBOTT IN-HOUSE,NASAL Swab (NO TRANSPORT MEDIA) 2 HR TAT - Swab, Nasopharynx [[id number]]  (Normal) Collected: 11/15/21 2052    Lab Status: Final result Specimen: Swab from Nasopharynx Updated: 11/15/21 2115     COVID19 Presumptive Negative    Narrative:      Fact sheet for providers: https://www.fda.gov/media/552607/download     Fact sheet for patients: https://www.fda.gov/media/109691/download    Test performed by PCR.  If inconsistent with clinical signs and symptoms patient should be tested with different authorized molecular test.    Blood Culture - Blood, Hand, Right [[id number]]  (Normal) Collected: 11/15/21 1413    Lab Status: Preliminary result Specimen: Blood from Hand, Right Updated: 11/18/21 1502     Blood Culture No growth at 3 days    Blood Culture - Blood, Arm, Right [[id number]]  (Normal)  Collected: 11/15/21 1413    Lab Status: Preliminary result Specimen: Blood from Arm, Right Updated: 11/18/21 1502     Blood Culture No growth at 3 days    Blood Culture - Blood, Arm, Left [[id number]]  (Normal) Collected: 11/08/21 2349    Lab Status: Final result Specimen: Blood from Arm, Left Updated: 11/14/21 0015     Blood Culture No growth at 5 days    Blood Culture - Blood, Arm, Right [[id number]]  (Normal) Collected: 11/08/21 2348    Lab Status: Final result Specimen: Blood from Arm, Right Updated: 11/14/21 0015     Blood Culture No growth at 5 days          CT Maxillofacial With Contrast    Result Date: 11/9/2021  CT Max Facial Area W INDICATION: Facial pain and swelling for 2 days TECHNIQUE: Maxillofacial CT with 100 cc of Isovue-300 IV contrast. Coronal and sagittal reconstructions were obtained.  Radiation dose reduction techniques included automated exposure control or exposure modulation based on body size. Count of known CT and cardiac nuc med studies performed in previous 12 months: 1. COMPARISON:  None available. FINDINGS: There is irregular soft tissue swelling over the right side of the face lateral to the nose and below the eye. No evidence of radiodense foreign body. No discrete, well-developed abscess is seen. Blood vessels are somewhat more prominent superficially in this area compared to the other side suggesting an inflammatory component. The paranasal sinuses are clear. Images at bone window show no destructive bone lesions. No fractures are seen. No retro-orbital masses are seen. The inferior ophthalmic vein is normal on both sides.     Irregular, nodular induration of the skin and subcutaneous tissues on the right suggesting an acute inflammatory process without a well-formed abscess. No bony abnormalities are seen. No retro-orbital extension of inflammation is noted. Signer Name: Prashant Saenz MD  Signed: 11/9/2021 12:17 AM  Workstation Name: [id number]  [facility]    CT Maxillofacial With & Without Contrast    Result Date: 11/16/2021  EXAMINATION: CT MAXILLOFACIAL W WO CON-  INDICATION: refractory cellulitis not improving wtih po abx  TECHNIQUE: Axial CT of the face performed without and with IV contrast with multiplanar reconstruction  The radiation dose reduction device was turned on for each scan per the ALARA (As Low as Reasonably Achievable) protocol.  COMPARISON: 11/8/2021  FINDINGS: Limited intracranial evaluation demonstrates no acute finding. The orbits are again normal in appearance without evidence of retroconal fluid collection or acute inflammatory change. The optic nerves and extraocular muscles are symmetric. The facial bones demonstrate no evidence of acute fracture or new lytic osseous process. No significant odontogenic disease is appreciated. The paranasal sinuses demonstrate no significant mucosal thickening or fluid opacification to suggest component of acute sinusitis. The mastoid air cells are clear. Evaluation of the superficial facial soft tissues demonstrate skin thickening and subcutaneous edema, similar in appearance to recent comparison and noted over the left midline/frontal region, right supraorbital region, prenasal soft tissues and along the right premaxillary region. There is no new distinct focal fluid collection concerning for abscess. No new deep or soft tissue extent. Partially imaged neck demonstrates no distinct suspicious pathologic lymphadenopathy of the upper cervical chain. Imaged vascular structures are normal.      Overall unchanged CT appearance of the facial soft tissues with multifocal skin thickening and subcutaneous edema noted along the frontal regions, prenasal soft tissues and premaxillary region. There is no evidence of new orbital involvement, focal fluid collection concerning for abscess or deeper soft tissue extent.   This report was finalized on 11/16/2021 9:14 AM by Montez Felix.                   Pending Labs     Order Current Status    Blood Culture - Blood, Arm, Right Preliminary result    Blood Culture - Blood, Hand, Right Preliminary result        Discharge Details        Discharge Medications      New Medications      Instructions Start Date   ertapenem 1 gm/100ml solution IV  Commonly known as: INVanz   1 g, Intravenous, Every 24 Hours Scheduled   Start Date: November 19, 2021     lactobacillus acidophilus capsule capsule   1 capsule, Oral, Daily   Start Date: November 19, 2021        Continue These Medications      Instructions Start Date   rizatriptan 10 MG tablet  Commonly known as: Maxalt   10 mg, Oral, Daily PRN, May repeat in 2 hours if needed         Stop These Medications    sulfamethoxazole-trimethoprim 800-160 MG per tablet  Commonly known as: BACTRIM DS,SEPTRA DS          Allergies   Allergen Reactions   • Augmentin [Amoxicillin-Pot Clavulanate] Hives   • Vancomycin Hives and Itching     Discharge Disposition:  Home or Self Care    Diet:  Hospital:  Diet Order   Procedures   • Diet Regular     Activity:  As tolerated    Restrictions or Other Recommendations:   Follow up with Dr. Hinojosa in the office for daily infusion of IV Invanz       CODE STATUS:    Code Status and Medical Interventions:   Ordered at: 11/15/21 1241     Level Of Support Discussed With:    Patient     Code Status (Patient has no pulse and is not breathing):    CPR (Attempt to Resuscitate)     Medical Interventions (Patient has pulse or is breathing):    Full Support       Future Appointments   Date Time Provider Department Center   12/15/2021  8:00 AM Faye Gramajo MD [facility]   3/29/2022  8:15 AM Faye Gramajo MD [facility]       Additional Instructions for the Follow-ups that You Need to Schedule     Discharge Follow-up with PCP   As directed       Currently Documented PCP:    Faye Gramajo MD    PCP Phone Number:    [phone]     Follow Up Details: Primary Care Doctor  - 1 week - bedside I and D for facial cellulitis         Discharge Follow-up with Specialty: Infectious Diseases; 1 Day   As directed      Specialty: Infectious Diseases    Follow Up: 1 Day    Follow Up Details: follow up tomorrow in ID offices tomorrow for daily IV infusion of Invanz             Will send sample for wound culture from Dr. Posada's I&D    Hussein Shankar MD  11/18/21    Time Spent on Discharge:  I spent  36  minutes on this discharge activity which included: face-to-face encounter with the patient, reviewing the data in the system, coordination of the care with the nursing staff as well as consultants, documentation, and entering orders.

## 2021-11-18 NOTE — PROGRESS NOTES
[facility] Progress Note    Admission Date: 11/15/2021    Kelly Humphrey  1995  [id number]    Date: 11/18/2021    Antibiotics:  Anti-Infectives (From admission, onward)    Ordered     Dose/Rate Route Frequency Start Stop    11/16/21 1017  clindamycin (CLEOCIN) 600 mg in dextrose 5% 50 mL IVPB (premix)        Ordering Provider: Maci Hinojosa MD    600 mg Intravenous Every 8 Hours 11/16/21 1200 11/18/21 0451    11/16/21 1017  ertapenem (INVanz) 1 g/100 mL 0.9% NS VTB (mbp)        Ordering Provider: Maci Hinojosa MD    1 g  over 30 Minutes Intravenous Every 24 Hours Scheduled 11/16/21 1130 11/24/21 0859    11/15/21 1234  vancomycin 1250 mg/250 mL 0.9% NS IVPB ([facility])        Ordering Provider: Tyrone Noriega MD    1,250 mg  over 90 Minutes Intravenous Once 11/15/21 1330 11/15/21 1726    11/15/21 1229  aztreonam (AZACTAM) 2 g/100 mL 0.9% NS (mbp)        Ordering Provider: Tyrone Noriega MD    2 g  over 30 Minutes Intravenous Once 11/15/21 1315 11/15/21 1445               HPI:   26 y.o  who developed a pimple on her face 9 days ago. She did not improve with topical retinoid products and presented to the ED 11/8. She had a CT that reportedly showed cellulitis only and was discharged with bactrim. She was prescribed bactrim but did not improve. When she followed up with her pcp today, the decision was made to admit her for parenteral therapy since she had failed oral therapy. She had severe swelling which resulted in her right eyelid being shut. She denies any vision changes currently though had a weird feeling with her vision in her right eye on Saturday whch has now resolved. Her  last fever was 11/9  up to 101.      11/16 afebrile, ongoing discomfort from right forehead and maxillary swellings which have not decreased although they are a little less erythematous  CT w/o evidence of orbit involvement  11/17 afebrile, tolerating invanz, induration at forehead and cheek slightly improved,  still with significant discomfort  11/18 afebrile, abscess is maturing and Dr Posada plans to do bedside I&D  No diarrhea                PE:  Vital Signs  Temp  Min: 97.7 °F (36.5 °C)  Max: 98.9 °F (37.2 °C)  BP  Min: 94/60  Max: 110/61  Pulse  Min: 54  Max: 85  Resp  Min: 16  Max: 18  SpO2  Min: 95 %  Max: 100 %  GENERAL: Awake and alert, very pleasant . in no acute distress but anxious.   HEENT: Normocephalic, atraumatic.  PERRL. EOMI. No conjunctival injection. No icterus. Oropharynx clear without evidence of thrush or exudate. Acneiform lesions right forehead, 1 x 2 cm area of erythema and induration to the right of the nose may be a little less erythematous but certainly is not decreased in size  NECK: Supple without nuchal rigidity. No mass.  LYMPH: No cervical, axillary or inguinal lymphadenopathy.  HEART: RRR; No murmur, rubs, gallops.   LUNGS: Clear to auscultation bilaterally without wheezing, rales, rhonchi. Normal respiratory effort. Nonlabored. No dullness.  ABDOMEN: Soft, nontender, nondistended. Positive bowel sounds. No rebound or guarding. NO mass or HSM.  EXT:  No cyanosis, clubbing or edema. No cord.  :  Without Zamora catheter.  MSK:  No deformity  SKIN: Warm and dry without cutaneous eruptions on Inspection/palpation.    NEURO: Oriented to PPT  Nonfocal, EOMI  PSYCHIATRIC: Normal insight and judgement. Cooperative with PE       Laboratory Data    Results from last 7 days   Lab Units 11/17/21  1152 11/16/21  0533 11/15/21  1413   WBC 10*3/mm3 6.80 6.22 5.70   HEMOGLOBIN g/dL 10.6* 10.4* 10.6*   HEMATOCRIT % 31.9* 32.1* 32.6*   PLATELETS 10*3/mm3 263 259 262     Results from last 7 days   Lab Units 11/17/21  1152   SODIUM mmol/L 140   POTASSIUM mmol/L 4.4   CHLORIDE mmol/L 104   CO2 mmol/L 25.0   BUN mg/dL 9   CREATININE mg/dL 0.67   GLUCOSE mg/dL 88   CALCIUM mg/dL 9.4     Results from last 7 days   Lab Units 11/15/21  1413   ALK PHOS U/L 49   BILIRUBIN mg/dL 0.4   ALT (SGPT) U/L 13   AST (SGOT)  U/L 19     Results from last 7 days   Lab Units 11/15/21  1413   SED RATE mm/hr 5     Results from last 7 days   Lab Units 11/16/21  0533   CRP mg/dL <0.30       Estimated Creatinine Clearance: 107.5 mL/min (by C-G formula based on SCr of 0.67 mg/dL).      Microbiology:  pending    Radiology:  Imaging Results (Last 72 Hours)     Procedure Component Value Units Date/Time    CT Maxillofacial With & Without Contrast [[id number]] Collected: 11/16/21 0907     Updated: 11/16/21 0917    Narrative:      EXAMINATION: CT MAXILLOFACIAL W WO CON-      INDICATION: refractory cellulitis not improving wtih po abx     TECHNIQUE: Axial CT of the face performed without and with IV contrast  with multiplanar reconstruction     The radiation dose reduction device was turned on for each scan per the  ALARA (As Low as Reasonably Achievable) protocol.     COMPARISON: 11/8/2021     FINDINGS: Limited intracranial evaluation demonstrates no acute finding.  The orbits are again normal in appearance without evidence of retroconal  fluid collection or acute inflammatory change. The optic nerves and  extraocular muscles are symmetric. The facial bones demonstrate no  evidence of acute fracture or new lytic osseous process. No significant  odontogenic disease is appreciated. The paranasal sinuses demonstrate no  significant mucosal thickening or fluid opacification to suggest  component of acute sinusitis. The mastoid air cells are clear.  Evaluation of the superficial facial soft tissues demonstrate skin  thickening and subcutaneous edema, similar in appearance to recent  comparison and noted over the left midline/frontal region, right  supraorbital region, prenasal soft tissues and along the right  premaxillary region. There is no new distinct focal fluid collection  concerning for abscess. No new deep or soft tissue extent. Partially  imaged neck demonstrates no distinct suspicious pathologic  lymphadenopathy of the upper cervical chain. Imaged  vascular structures  are normal.       Impression:      Overall unchanged CT appearance of the facial soft tissues  with multifocal skin thickening and subcutaneous edema noted along the  frontal regions, prenasal soft tissues and premaxillary region. There is  no evidence of new orbital involvement, focal fluid collection  concerning for abscess or deeper soft tissue extent.        This report was finalized on 11/16/2021 9:14 AM by Montez Felix.             I personally reviewed the radiographic studies          Impression:         -- Severe facial cellulitis complicating acneiform lesions  She had a poor or limited response to Bactrim suggesting that a streptococcal infection may have been the pathogen  MRSA pcr negative  She has had some further improvement on invanz  The fluid collection appears to be better circumscribed     -- Amoxacillin allergy, cephalosporin status unknown  Tolerating Invanz     PLAN/RECOMMENDATIONS:   Thank you for asking us to see Kelly Humphrey, I recommend the following:        -- MRSA nasal pcr- negative     --  ertapenem 1g daily  If discharge later today is ok with all she will start invanz 1g daily tomorrow at my office  -- s/p short course parenteral clindamycin     -- add doxycycline to Invanz for its antiinflammatory effects   Recommend 100mg bid x 10 days    -- probiotic while on antibiotics Reviewed cdiff precautions    -- Dermatology referral as outpatient     -- F/U with me Monday at 11    Discussed with RN  Maci Hinojosa MD  11/18/2021

## 2021-11-19 ENCOUNTER — TRANSITIONAL CARE MANAGEMENT TELEPHONE ENCOUNTER (OUTPATIENT)
Dept: CALL CENTER | Facility: HOSPITAL | Age: 26
End: 2021-11-19

## 2021-11-19 NOTE — OUTREACH NOTE
Prep Survey      Responses   [facility] patient discharged from? [place]   Is LACE score < 7 ? No   Emergency Room discharge w/ pulse ox? No   Eligibility [facility]   Date of Admission 11/15/21   Date of Discharge 11/18/21   Discharge Disposition Home or Self Care   Discharge diagnosis Facial cellulitis I&D of Right Facial Abscess    Does the patient have one of the following disease processes/diagnoses(primary or secondary)? General Surgery   Does the patient have Home health ordered? No   Is there a DME ordered? No   Prep survey completed? Yes          Lucrecia Holly RN

## 2021-11-19 NOTE — OUTREACH NOTE
Call Center TCM Note      Responses   [facility] patient discharged from? [place]   Does the patient have one of the following disease processes/diagnoses(primary or secondary)? General Surgery   TCM attempt successful? No   Unsuccessful attempts Attempt 1  [attempted both patient and father]          Belle Baker RN    11/19/2021, 16:13 EST

## 2021-11-19 NOTE — OUTREACH NOTE
Call Center TCM Note      Responses   [facility] patient discharged from? [place]   Does the patient have one of the following disease processes/diagnoses(primary or secondary)? General Surgery   TCM attempt successful? No          Belle Baker RN    11/19/2021, 16:30 EST

## 2021-11-20 LAB
BACTERIA SPEC AEROBE CULT: NORMAL
BACTERIA SPEC AEROBE CULT: NORMAL

## 2021-11-21 LAB
BACTERIA SPEC AEROBE CULT: NORMAL
GRAM STN SPEC: NORMAL
GRAM STN SPEC: NORMAL

## 2021-11-27 ENCOUNTER — READMISSION MANAGEMENT (OUTPATIENT)
Dept: CALL CENTER | Facility: HOSPITAL | Age: 26
End: 2021-11-27

## 2021-11-27 NOTE — OUTREACH NOTE
General Surgery Week 2 Survey      Responses   [facility] patient discharged from? [place]   Does the patient have one of the following disease processes/diagnoses(primary or secondary)? General Surgery   Week 2 attempt successful? No   Unsuccessful attempts Attempt 1          Lacey Christianson RN

## 2021-11-30 ENCOUNTER — READMISSION MANAGEMENT (OUTPATIENT)
Dept: CALL CENTER | Facility: HOSPITAL | Age: 26
End: 2021-11-30

## 2021-11-30 NOTE — OUTREACH NOTE
General Surgery Week 2 Survey      Responses   [facility] patient discharged from? [place]   Does the patient have one of the following disease processes/diagnoses(primary or secondary)? General Surgery   Week 2 attempt successful? No   Unsuccessful attempts Attempt 2          Bethany Doll RN

## 2021-12-08 ENCOUNTER — READMISSION MANAGEMENT (OUTPATIENT)
Dept: CALL CENTER | Facility: HOSPITAL | Age: 26
End: 2021-12-08

## 2021-12-08 NOTE — OUTREACH NOTE
General Surgery Week 3 Survey      Responses   [facility] patient discharged from? [place]   Does the patient have one of the following disease processes/diagnoses(primary or secondary)? General Surgery   Week 3 attempt successful? No   Unsuccessful attempts Attempt 1  [attempted both patient and father]          Belle Baker RN

## 2021-12-13 ENCOUNTER — READMISSION MANAGEMENT (OUTPATIENT)
Dept: CALL CENTER | Facility: HOSPITAL | Age: 26
End: 2021-12-13

## 2021-12-13 NOTE — OUTREACH NOTE
General Surgery Week 3 Survey      Responses   [facility] patient discharged from? [place]   Does the patient have one of the following disease processes/diagnoses(primary or secondary)? General Surgery   Week 3 attempt successful? Yes   Rescheduled Rescheduled-pt requested   Discharge diagnosis Facial cellulitis I&D of Right Facial Abscess    Person spoke with today (if not patient) and relationship Dimas, haleigh other          Lacey Christianson RN

## 2021-12-17 ENCOUNTER — READMISSION MANAGEMENT (OUTPATIENT)
Dept: CALL CENTER | Facility: HOSPITAL | Age: 26
End: 2021-12-17

## 2021-12-17 NOTE — OUTREACH NOTE
General Surgery Week 3 Survey      Responses   [facility] patient discharged from? [place]   Does the patient have one of the following disease processes/diagnoses(primary or secondary)? General Surgery   Week 3 attempt successful? No   Rescheduled Revoked          Marcy Mak RN

## 2021-12-22 ENCOUNTER — LAB (OUTPATIENT)
Dept: LAB | Facility: HOSPITAL | Age: 26
End: 2021-12-22

## 2021-12-22 ENCOUNTER — OFFICE VISIT (OUTPATIENT)
Dept: INTERNAL MEDICINE | Facility: CLINIC | Age: 26
End: 2021-12-22

## 2021-12-22 VITALS
SYSTOLIC BLOOD PRESSURE: 110 MMHG | OXYGEN SATURATION: 99 % | TEMPERATURE: 97.1 F | DIASTOLIC BLOOD PRESSURE: 60 MMHG | RESPIRATION RATE: 16 BRPM | WEIGHT: 118.4 LBS | BODY MASS INDEX: 17.95 KG/M2 | HEART RATE: 66 BPM | HEIGHT: 68 IN

## 2021-12-22 DIAGNOSIS — D64.9 NORMOCYTIC ANEMIA: ICD-10-CM

## 2021-12-22 DIAGNOSIS — G43.709 CHRONIC MIGRAINE WITHOUT AURA WITHOUT STATUS MIGRAINOSUS, NOT INTRACTABLE: ICD-10-CM

## 2021-12-22 DIAGNOSIS — D64.9 NORMOCYTIC ANEMIA: Primary | ICD-10-CM

## 2021-12-22 DIAGNOSIS — L03.211 FACIAL CELLULITIS: ICD-10-CM

## 2021-12-22 PROCEDURE — 99214 OFFICE O/P EST MOD 30 MIN: CPT | Performed by: INTERNAL MEDICINE

## 2021-12-22 RX ORDER — DOXYCYCLINE HYCLATE 100 MG/1
CAPSULE ORAL
COMMUNITY
Start: 2021-11-23 | End: 2022-06-20

## 2021-12-22 NOTE — PROGRESS NOTES
"Internal Medicine Follow Up    Chief Complaint  Kelly Humphrey is a 26 y.o. female who presents today for follow up of chronic medical conditions outlined below.    Chief Complaint   Patient presents with   • Transitional Care Management     Hospital follow up        HPI  Ms. Humphrey comes in today for follow up. She was admitted in November with facial cellulitis and discharged on ertapenem injections which she discontinued after a few days. She has been following with ID and now on doxycycline. She is doing well. Has a small scar from bedside I and D and slight residual hyperpigmentation. She will no longer be seeing ID but has upcoming appointment with dermatology. She has not been having migraines so long as she stays hydrated. She has used maxalt once with relief.       Review of Systems  Review of Systems   Constitutional: Negative.    Skin: Positive for color change.   Neurological: Negative for headache.        Current Medications  Current Outpatient Medications on File Prior to Visit   Medication Sig Dispense Refill   • doxycycline (VIBRAMYCIN) 100 MG capsule      • rizatriptan (Maxalt) 10 MG tablet Take 1 tablet by mouth Daily As Needed for Migraine. May repeat in 2 hours if needed 12 tablet 1   • [DISCONTINUED] Drospiren-Eth Estrad-Levomefol 3-0.02-0.451 MG tablet TAKE 1 TABLET BY MOUTH DAILY 28 tablet 5     No current facility-administered medications on file prior to visit.       Allergies  Allergies   Allergen Reactions   • Augmentin [Amoxicillin-Pot Clavulanate] Hives   • Vancomycin Hives and Itching       Objective  Visit Vitals  /60   Pulse 66   Temp 97.1 °F (36.2 °C)   Resp 16   Ht 172.7 cm (67.99\")   Wt 53.7 kg (118 lb 6.4 oz)   SpO2 99%   BMI 18.01 kg/m²        Physical Exam  Physical Exam  Vitals and nursing note reviewed.   Constitutional:       General: She is not in acute distress.     Appearance: Normal appearance. She is well-developed and normal weight. She is not ill-appearing or " toxic-appearing.   HENT:      Head: Normocephalic and atraumatic.   Eyes:      Conjunctiva/sclera: Conjunctivae normal.   Pulmonary:      Effort: Pulmonary effort is normal. No respiratory distress.   Skin:     General: Skin is warm and dry.      Comments: Slight hyperpigmentation of skin on R cheek with small scar. This is covered in makeup so only partially visible. No swelling.   Neurological:      Mental Status: She is alert and oriented to person, place, and time.         Results  Results for orders placed or performed during the hospital encounter of 11/15/21   Blood Culture - Blood, Hand, Right    Specimen: Hand, Right; Blood   Result Value Ref Range    Blood Culture No growth at 5 days    Blood Culture - Blood, Arm, Right    Specimen: Arm, Right; Blood   Result Value Ref Range    Blood Culture No growth at 5 days    MRSA Screen, PCR (Inpatient) - Swab, Nares    Specimen: Nares; Swab   Result Value Ref Range    MRSA PCR Negative Negative   COVID-19, [facility] IN-HOUSE,NASAL Swab (NO TRANSPORT MEDIA) 2 HR TAT - Swab, Nasopharynx    Specimen: Nasopharynx; Swab   Result Value Ref Range    COVID19 Presumptive Negative Presumptive Negative - Ref. Range   Wound Culture - Wound, Face    Specimen: Face; Wound   Result Value Ref Range    Wound Culture No growth at 3 days     Gram Stain Many (4+) WBCs seen     Gram Stain No organisms seen    CBC Auto Differential    Specimen: Blood   Result Value Ref Range    WBC 5.70 3.40 - 10.80 10*3/mm3    RBC 3.53 (L) 3.77 - 5.28 10*6/mm3    Hemoglobin 10.6 (L) 12.0 - 15.9 g/dL    Hematocrit 32.6 (L) 34.0 - 46.6 %    MCV 92.4 79.0 - 97.0 fL    MCH 30.0 26.6 - 33.0 pg    MCHC 32.5 31.5 - 35.7 g/dL    RDW 13.1 12.3 - 15.4 %    RDW-SD 44.4 37.0 - 54.0 fl    MPV 10.7 6.0 - 12.0 fL    Platelets 262 140 - 450 10*3/mm3    Neutrophil % 41.4 (L) 42.7 - 76.0 %    Lymphocyte % 47.0 (H) 19.6 - 45.3 %    Monocyte % 8.6 5.0 - 12.0 %    Eosinophil % 1.9 0.3 - 6.2 %    Basophil % 0.9 0.0 - 1.5 %     Immature Grans % 0.2 0.0 - 0.5 %    Neutrophils, Absolute 2.36 1.70 - 7.00 10*3/mm3    Lymphocytes, Absolute 2.68 0.70 - 3.10 10*3/mm3    Monocytes, Absolute 0.49 0.10 - 0.90 10*3/mm3    Eosinophils, Absolute 0.11 0.00 - 0.40 10*3/mm3    Basophils, Absolute 0.05 0.00 - 0.20 10*3/mm3    Immature Grans, Absolute 0.01 0.00 - 0.05 10*3/mm3    nRBC 0.0 0.0 - 0.2 /100 WBC   Comprehensive Metabolic Panel    Specimen: Blood   Result Value Ref Range    Glucose 88 65 - 99 mg/dL    BUN 8 6 - 20 mg/dL    Creatinine 0.86 0.57 - 1.00 mg/dL    Sodium 137 136 - 145 mmol/L    Potassium 4.6 3.5 - 5.2 mmol/L    Chloride 105 98 - 107 mmol/L    CO2 22.0 22.0 - 29.0 mmol/L    Calcium 9.0 8.6 - 10.5 mg/dL    Total Protein 6.8 6.0 - 8.5 g/dL    Albumin 4.30 3.50 - 5.20 g/dL    ALT (SGPT) 13 1 - 33 U/L    AST (SGOT) 19 1 - 32 U/L    Alkaline Phosphatase 49 39 - 117 U/L    Total Bilirubin 0.4 0.0 - 1.2 mg/dL    eGFR Non African Amer 80 >60 mL/min/1.73    Globulin 2.5 gm/dL    A/G Ratio 1.7 g/dL    BUN/Creatinine Ratio 9.3 7.0 - 25.0    Anion Gap 10.0 5.0 - 15.0 mmol/L   Sedimentation Rate    Specimen: Blood   Result Value Ref Range    Sed Rate 5 0 - 20 mm/hr   C-reactive Protein    Specimen: Blood   Result Value Ref Range    C-Reactive Protein <0.30 0.00 - 0.50 mg/dL   Lactic Acid, Plasma    Specimen: Blood   Result Value Ref Range    Lactate 0.6 0.5 - 2.0 mmol/L   CBC (No Diff)    Specimen: Blood   Result Value Ref Range    WBC 6.22 3.40 - 10.80 10*3/mm3    RBC 3.46 (L) 3.77 - 5.28 10*6/mm3    Hemoglobin 10.4 (L) 12.0 - 15.9 g/dL    Hematocrit 32.1 (L) 34.0 - 46.6 %    MCV 92.8 79.0 - 97.0 fL    MCH 30.1 26.6 - 33.0 pg    MCHC 32.4 31.5 - 35.7 g/dL    RDW 13.2 12.3 - 15.4 %    RDW-SD 45.1 37.0 - 54.0 fl    MPV 10.6 6.0 - 12.0 fL    Platelets 259 140 - 450 10*3/mm3   Basic Metabolic Panel    Specimen: Blood   Result Value Ref Range    Glucose 77 65 - 99 mg/dL    BUN 7 6 - 20 mg/dL    Creatinine 0.82 0.57 - 1.00 mg/dL    Sodium 140 136 - 145  mmol/L    Potassium 4.0 3.5 - 5.2 mmol/L    Chloride 105 98 - 107 mmol/L    CO2 24.0 22.0 - 29.0 mmol/L    Calcium 8.9 8.6 - 10.5 mg/dL    eGFR Non African Amer 84 >60 mL/min/1.73    BUN/Creatinine Ratio 8.5 7.0 - 25.0    Anion Gap 11.0 5.0 - 15.0 mmol/L   C-reactive Protein    Specimen: Blood   Result Value Ref Range    C-Reactive Protein <0.30 0.00 - 0.50 mg/dL   Procalcitonin    Specimen: Blood   Result Value Ref Range    Procalcitonin 0.03 0.00 - 0.25 ng/mL   CBC (No Diff)    Specimen: Blood   Result Value Ref Range    WBC 6.80 3.40 - 10.80 10*3/mm3    RBC 3.54 (L) 3.77 - 5.28 10*6/mm3    Hemoglobin 10.6 (L) 12.0 - 15.9 g/dL    Hematocrit 31.9 (L) 34.0 - 46.6 %    MCV 90.1 79.0 - 97.0 fL    MCH 29.9 26.6 - 33.0 pg    MCHC 33.2 31.5 - 35.7 g/dL    RDW 13.1 12.3 - 15.4 %    RDW-SD 43.2 37.0 - 54.0 fl    MPV 10.2 6.0 - 12.0 fL    Platelets 263 140 - 450 10*3/mm3   Basic Metabolic Panel    Specimen: Blood   Result Value Ref Range    Glucose 88 65 - 99 mg/dL    BUN 9 6 - 20 mg/dL    Creatinine 0.67 0.57 - 1.00 mg/dL    Sodium 140 136 - 145 mmol/L    Potassium 4.4 3.5 - 5.2 mmol/L    Chloride 104 98 - 107 mmol/L    CO2 25.0 22.0 - 29.0 mmol/L    Calcium 9.4 8.6 - 10.5 mg/dL    eGFR Non African Amer 106 >60 mL/min/1.73    BUN/Creatinine Ratio 13.4 7.0 - 25.0    Anion Gap 11.0 5.0 - 15.0 mmol/L        Assessment and Plan  Diagnoses and all orders for this visit:    Normocytic anemia  - New anemia during hospitalization with hgb 10.6  - No overt bleeding, menstrual flow normal  - May be dilutional as she received IVF during admission  - check CBC, iron studies, ferritin, B12, folate    Facial cellulitis  - improving after inpatient admission for IV abx which she has completed. Now on doxycycline per ID with plan to see dermatology.    Chronic migraine without aura without status migrainosus, not intractable  - Headaches improved on maxalt 10mg daily PRN. Has only had 1 recent migraine.     Health Maintenance  - Pap  smear: She reports last pap smear 2018 at [facility]. Results normal. Referring to GYN.  - Mammogram: Start screening at age 40.  - Colonoscopy: Start screening at age 45.  - Immunizations: COVID complete, recommend booster. Flu deferred as she thinks she may have already had it. Tdap 2021.  - HCV: negative.  - Depression screening: negative 3/2021    Return in about 3 months (around 3/29/2022) for as scheduled, Labs today.

## 2021-12-23 LAB
BASOPHILS # BLD AUTO: 0.07 10*3/MM3 (ref 0–0.2)
BASOPHILS NFR BLD AUTO: 1.1 % (ref 0–1.5)
EOSINOPHIL # BLD AUTO: 0.07 10*3/MM3 (ref 0–0.4)
EOSINOPHIL NFR BLD AUTO: 1.1 % (ref 0.3–6.2)
ERYTHROCYTE [DISTWIDTH] IN BLOOD BY AUTOMATED COUNT: 13.8 % (ref 12.3–15.4)
FERRITIN SERPL-MCNC: 14.3 NG/ML (ref 13–150)
FOLATE SERPL-MCNC: 11 NG/ML (ref 4.78–24.2)
HCT VFR BLD AUTO: 34.5 % (ref 34–46.6)
HGB BLD-MCNC: 11.5 G/DL (ref 12–15.9)
IMM GRANULOCYTES # BLD AUTO: 0.01 10*3/MM3 (ref 0–0.05)
IMM GRANULOCYTES NFR BLD AUTO: 0.2 % (ref 0–0.5)
IRON SATN MFR SERPL: 10 % (ref 20–50)
IRON SERPL-MCNC: 54 MCG/DL (ref 37–145)
LYMPHOCYTES # BLD AUTO: 2.37 10*3/MM3 (ref 0.7–3.1)
LYMPHOCYTES NFR BLD AUTO: 35.6 % (ref 19.6–45.3)
MCH RBC QN AUTO: 30.3 PG (ref 26.6–33)
MCHC RBC AUTO-ENTMCNC: 33.3 G/DL (ref 31.5–35.7)
MCV RBC AUTO: 91 FL (ref 79–97)
MONOCYTES # BLD AUTO: 0.63 10*3/MM3 (ref 0.1–0.9)
MONOCYTES NFR BLD AUTO: 9.5 % (ref 5–12)
NEUTROPHILS # BLD AUTO: 3.5 10*3/MM3 (ref 1.7–7)
NEUTROPHILS NFR BLD AUTO: 52.5 % (ref 42.7–76)
NRBC BLD AUTO-RTO: 0 /100 WBC (ref 0–0.2)
PLATELET # BLD AUTO: 267 10*3/MM3 (ref 140–450)
RBC # BLD AUTO: 3.79 10*6/MM3 (ref 3.77–5.28)
TIBC SERPL-MCNC: 532 MCG/DL
UIBC SERPL-MCNC: 478 MCG/DL (ref 112–346)
VIT B12 SERPL-MCNC: 545 PG/ML (ref 211–946)
WBC # BLD AUTO: 6.65 10*3/MM3 (ref 3.4–10.8)

## 2022-06-20 ENCOUNTER — OFFICE VISIT (OUTPATIENT)
Dept: INTERNAL MEDICINE | Facility: CLINIC | Age: 27
End: 2022-06-20

## 2022-06-20 ENCOUNTER — LAB (OUTPATIENT)
Dept: LAB | Facility: HOSPITAL | Age: 27
End: 2022-06-20

## 2022-06-20 VITALS
TEMPERATURE: 97 F | DIASTOLIC BLOOD PRESSURE: 70 MMHG | OXYGEN SATURATION: 99 % | BODY MASS INDEX: 17.88 KG/M2 | HEART RATE: 62 BPM | SYSTOLIC BLOOD PRESSURE: 108 MMHG | HEIGHT: 68 IN | WEIGHT: 118 LBS

## 2022-06-20 DIAGNOSIS — R11.2 NAUSEA AND VOMITING, UNSPECIFIED VOMITING TYPE: ICD-10-CM

## 2022-06-20 DIAGNOSIS — Z00.00 ANNUAL PHYSICAL EXAM: Primary | ICD-10-CM

## 2022-06-20 DIAGNOSIS — D64.9 NORMOCYTIC ANEMIA: ICD-10-CM

## 2022-06-20 DIAGNOSIS — Z00.00 ANNUAL PHYSICAL EXAM: ICD-10-CM

## 2022-06-20 DIAGNOSIS — G43.709 CHRONIC MIGRAINE WITHOUT AURA WITHOUT STATUS MIGRAINOSUS, NOT INTRACTABLE: ICD-10-CM

## 2022-06-20 DIAGNOSIS — L70.0 ACNE VULGARIS: ICD-10-CM

## 2022-06-20 DIAGNOSIS — Z01.419 WELL WOMAN EXAM WITH ROUTINE GYNECOLOGICAL EXAM: ICD-10-CM

## 2022-06-20 PROBLEM — F07.81 POST CONCUSSION SYNDROME: Status: RESOLVED | Noted: 2021-02-15 | Resolved: 2022-06-20

## 2022-06-20 PROBLEM — L03.211 FACIAL CELLULITIS: Status: RESOLVED | Noted: 2021-11-15 | Resolved: 2022-06-20

## 2022-06-20 LAB
ALBUMIN SERPL-MCNC: 4.2 G/DL (ref 3.5–5.2)
ALBUMIN/GLOB SERPL: 1.9 G/DL
ALP SERPL-CCNC: 26 U/L (ref 39–117)
ALT SERPL W P-5'-P-CCNC: 7 U/L (ref 1–33)
ANION GAP SERPL CALCULATED.3IONS-SCNC: 12.6 MMOL/L (ref 5–15)
AST SERPL-CCNC: 19 U/L (ref 1–32)
BASOPHILS # BLD AUTO: 0.04 10*3/MM3 (ref 0–0.2)
BASOPHILS NFR BLD AUTO: 0.6 % (ref 0–1.5)
BILIRUB SERPL-MCNC: 0.4 MG/DL (ref 0–1.2)
BUN SERPL-MCNC: 15 MG/DL (ref 6–20)
BUN/CREAT SERPL: 18.8 (ref 7–25)
CALCIUM SPEC-SCNC: 9.2 MG/DL (ref 8.6–10.5)
CHLORIDE SERPL-SCNC: 104 MMOL/L (ref 98–107)
CHOLEST SERPL-MCNC: 174 MG/DL (ref 0–200)
CO2 SERPL-SCNC: 24.4 MMOL/L (ref 22–29)
CREAT SERPL-MCNC: 0.8 MG/DL (ref 0.57–1)
DEPRECATED RDW RBC AUTO: 45.4 FL (ref 37–54)
EGFRCR SERPLBLD CKD-EPI 2021: 104.4 ML/MIN/1.73
EOSINOPHIL # BLD AUTO: 0.15 10*3/MM3 (ref 0–0.4)
EOSINOPHIL NFR BLD AUTO: 2.4 % (ref 0.3–6.2)
ERYTHROCYTE [DISTWIDTH] IN BLOOD BY AUTOMATED COUNT: 13.4 % (ref 12.3–15.4)
GLOBULIN UR ELPH-MCNC: 2.2 GM/DL
GLUCOSE SERPL-MCNC: 77 MG/DL (ref 65–99)
HBA1C MFR BLD: 4.8 % (ref 4.8–5.6)
HCT VFR BLD AUTO: 36.5 % (ref 34–46.6)
HDLC SERPL-MCNC: 56 MG/DL (ref 40–60)
HGB BLD-MCNC: 12 G/DL (ref 12–15.9)
IMM GRANULOCYTES # BLD AUTO: 0.01 10*3/MM3 (ref 0–0.05)
IMM GRANULOCYTES NFR BLD AUTO: 0.2 % (ref 0–0.5)
LDLC SERPL CALC-MCNC: 101 MG/DL (ref 0–100)
LDLC/HDLC SERPL: 1.79 {RATIO}
LYMPHOCYTES # BLD AUTO: 2.67 10*3/MM3 (ref 0.7–3.1)
LYMPHOCYTES NFR BLD AUTO: 42.4 % (ref 19.6–45.3)
MCH RBC QN AUTO: 30.2 PG (ref 26.6–33)
MCHC RBC AUTO-ENTMCNC: 32.9 G/DL (ref 31.5–35.7)
MCV RBC AUTO: 91.7 FL (ref 79–97)
MONOCYTES # BLD AUTO: 0.69 10*3/MM3 (ref 0.1–0.9)
MONOCYTES NFR BLD AUTO: 11 % (ref 5–12)
NEUTROPHILS NFR BLD AUTO: 2.74 10*3/MM3 (ref 1.7–7)
NEUTROPHILS NFR BLD AUTO: 43.4 % (ref 42.7–76)
NRBC BLD AUTO-RTO: 0 /100 WBC (ref 0–0.2)
PLATELET # BLD AUTO: 251 10*3/MM3 (ref 140–450)
PMV BLD AUTO: 11.8 FL (ref 6–12)
POTASSIUM SERPL-SCNC: 4.2 MMOL/L (ref 3.5–5.2)
PROT SERPL-MCNC: 6.4 G/DL (ref 6–8.5)
RBC # BLD AUTO: 3.98 10*6/MM3 (ref 3.77–5.28)
SODIUM SERPL-SCNC: 141 MMOL/L (ref 136–145)
TRIGL SERPL-MCNC: 90 MG/DL (ref 0–150)
TSH SERPL DL<=0.05 MIU/L-ACNC: 2.23 UIU/ML (ref 0.27–4.2)
VLDLC SERPL-MCNC: 17 MG/DL (ref 5–40)
WBC NRBC COR # BLD: 6.3 10*3/MM3 (ref 3.4–10.8)

## 2022-06-20 PROCEDURE — 84443 ASSAY THYROID STIM HORMONE: CPT

## 2022-06-20 PROCEDURE — 99395 PREV VISIT EST AGE 18-39: CPT | Performed by: INTERNAL MEDICINE

## 2022-06-20 PROCEDURE — 80053 COMPREHEN METABOLIC PANEL: CPT

## 2022-06-20 PROCEDURE — 80061 LIPID PANEL: CPT

## 2022-06-20 PROCEDURE — 83013 H PYLORI (C-13) BREATH: CPT

## 2022-06-20 PROCEDURE — 83036 HEMOGLOBIN GLYCOSYLATED A1C: CPT

## 2022-06-20 PROCEDURE — 85025 COMPLETE CBC W/AUTO DIFF WBC: CPT

## 2022-06-20 PROCEDURE — 99214 OFFICE O/P EST MOD 30 MIN: CPT | Performed by: INTERNAL MEDICINE

## 2022-06-20 PROCEDURE — 36415 COLL VENOUS BLD VENIPUNCTURE: CPT

## 2022-06-20 RX ORDER — SPIRONOLACTONE 100 MG/1
100 TABLET, FILM COATED ORAL DAILY
COMMUNITY
Start: 2022-02-26

## 2022-06-20 RX ORDER — NORGESTIMATE AND ETHINYL ESTRADIOL 0.25-0.035
KIT ORAL
COMMUNITY
Start: 2022-02-19

## 2022-06-20 RX ORDER — RIZATRIPTAN BENZOATE 10 MG/1
10 TABLET ORAL DAILY PRN
Qty: 12 TABLET | Refills: 1 | Status: SHIPPED | OUTPATIENT
Start: 2022-06-20

## 2022-06-20 RX ORDER — MULTIVIT/IRON SULF/FOLIC ACID 15MG-0.4MG
TABLET ORAL DAILY
COMMUNITY

## 2022-06-20 RX ORDER — FAMOTIDINE 40 MG/1
40 TABLET, FILM COATED ORAL NIGHTLY PRN
Qty: 90 TABLET | Refills: 1 | Status: SHIPPED | OUTPATIENT
Start: 2022-06-20 | End: 2023-02-06

## 2022-06-20 NOTE — PROGRESS NOTES
Internal Medicine Annual Exam  Kelly Humphrey is a 26 y.o. female who presents today for an annual exam and with concerns as outlined below.    Chief Complaint  Chief Complaint   Patient presents with   • Annual Exam   • Nausea     About a couple months, occasional vomiting        HPI  Ms. Humphrey comes in today for her physical. She notes losing insurance when she lost her job at end of last year. Now working at a pharmacy. Has had a month or two of AM nausea with subsequent vomiting maybe once a week. She notes emesis appears like stomach acid. No blood. No change in stools or blood in stool. No symptoms during the day. No weight loss. She is sexually active but not lately and LMP 6/1. She declines pregnancy test. She continues to control occasional migraine with maxalt. She is on OCP and aldactone for acne. She needs GYN exam and referral. She also needs COVID19 booster. She has upcoming dental work planned. Denies use of tobacco, ecigarettes, illicit drugs, and alcohol.         Review of Systems  Review of Systems   Constitutional: Negative.    HENT: Positive for dental problem (upcoming dental work). Negative for hearing loss.    Eyes: Negative.    Respiratory: Negative.    Cardiovascular: Negative.    Gastrointestinal: Positive for abdominal pain, nausea, vomiting and indigestion. Negative for anal bleeding, blood in stool, constipation, diarrhea and GERD.   Genitourinary: Negative.    Musculoskeletal: Negative.    Skin: Negative.    Neurological: Positive for headache (migraines).   Psychiatric/Behavioral: Negative.         Past Medical History  Past Medical History:   Diagnosis Date   • ADHD (attention deficit hyperactivity disorder)    • Anxiety    • Facial cellulitis 11/15/2021   • Post concussion syndrome 2/15/2021        Surgical History  Past Surgical History:   Procedure Laterality Date   • TONSILLECTOMY  2017        Family History  Family History   Adopted: Yes   Problem Relation Age of Onset   • Kidney  "disease Other         Social History  Social History     Socioeconomic History   • Marital status: Single   Tobacco Use   • Smoking status: Never Smoker   • Smokeless tobacco: Never Used   Vaping Use   • Vaping Use: Never used   Substance and Sexual Activity   • Alcohol use: Not Currently     Comment: occasional beer, once every 2 weeks   • Drug use: No   • Sexual activity: Yes     Partners: Male     Birth control/protection: Condom, OCP     Comment: single, steady partner        Current Medications  Current Outpatient Medications on File Prior to Visit   Medication Sig Dispense Refill   • Multiple Vitamins-Iron (multivitamin with iron) tablet tablet Take  by mouth Daily.     • norgestimate-ethinyl estradiol (ORTHO-CYCLEN) 0.25-35 MG-MCG per tablet      • Probiotic Product (PROBIOTIC DAILY PO) Take  by mouth.     • spironolactone (ALDACTONE) 100 MG tablet Take 100 mg by mouth Daily.     • [DISCONTINUED] rizatriptan (Maxalt) 10 MG tablet Take 1 tablet by mouth Daily As Needed for Migraine. May repeat in 2 hours if needed 12 tablet 1   • [DISCONTINUED] doxycycline (VIBRAMYCIN) 100 MG capsule      • [DISCONTINUED] Drospiren-Eth Estrad-Levomefol 3-0.02-0.451 MG tablet TAKE 1 TABLET BY MOUTH DAILY 28 tablet 5     No current facility-administered medications on file prior to visit.       Allergies  Allergies   Allergen Reactions   • Augmentin [Amoxicillin-Pot Clavulanate] Hives   • Vancomycin Hives and Itching        Objective  Visit Vitals  /70   Pulse 62   Temp 97 °F (36.1 °C)   Ht 172.7 cm (68\")   Wt 53.5 kg (118 lb)   SpO2 99%   BMI 17.94 kg/m²        Physical Exam  Physical Exam  Vitals and nursing note reviewed.   Constitutional:       General: She is not in acute distress.     Appearance: She is well-developed. She is not ill-appearing, toxic-appearing or diaphoretic.      Comments: Thin but well appearing   HENT:      Head: Normocephalic and atraumatic.      Right Ear: Tympanic membrane, ear canal and " external ear normal.      Left Ear: Tympanic membrane, ear canal and external ear normal.      Nose: Nose normal.   Eyes:      General: No scleral icterus.     Conjunctiva/sclera: Conjunctivae normal.   Cardiovascular:      Rate and Rhythm: Normal rate and regular rhythm.      Heart sounds: Normal heart sounds. No murmur heard.  Pulmonary:      Effort: Pulmonary effort is normal. No respiratory distress.      Breath sounds: Normal breath sounds.   Abdominal:      General: Bowel sounds are normal. There is no distension.      Palpations: Abdomen is soft. There is no mass.      Tenderness: There is abdominal tenderness (lower abdomen). There is no guarding or rebound.   Musculoskeletal:         General: No deformity.      Cervical back: Neck supple.      Right lower leg: No edema.      Left lower leg: No edema.   Lymphadenopathy:      Cervical: No cervical adenopathy.   Skin:     General: Skin is warm and dry.      Findings: No rash.   Neurological:      Mental Status: She is alert and oriented to person, place, and time. Mental status is at baseline.      Gait: Gait normal.   Psychiatric:         Mood and Affect: Mood normal.         Behavior: Behavior normal.         Thought Content: Thought content normal.         Judgment: Judgment normal.          Results  Results for orders placed or performed in visit on 12/22/21   Folate    Specimen: Blood    Blood  Release to [facility]   Result Value Ref Range    Folate 11.00 4.78 - 24.20 ng/mL   Vitamin B12    Specimen: Blood    Blood  Release to [facility]   Result Value Ref Range    Vitamin B-12 545 211 - 946 pg/mL   Ferritin    Specimen: Blood    Blood  Release to [facility]   Result Value Ref Range    Ferritin 14.30 13.00 - 150.00 ng/mL   Iron Profile    Specimen: Blood    Blood  Release to [facility]   Result Value Ref Range    TIBC 532 mcg/dL    UIBC 478 (H) 112 - 346 mcg/dL    Iron 54 37 - 145 mcg/dL    Iron Saturation 10 (L) 20 - 50 %   CBC & Differential    Specimen: Blood    Blood   Release to [facility]   Result Value Ref Range    WBC 6.65 3.40 - 10.80 10*3/mm3    RBC 3.79 3.77 - 5.28 10*6/mm3    Hemoglobin 11.5 (L) 12.0 - 15.9 g/dL    Hematocrit 34.5 34.0 - 46.6 %    MCV 91.0 79.0 - 97.0 fL    MCH 30.3 26.6 - 33.0 pg    MCHC 33.3 31.5 - 35.7 g/dL    RDW 13.8 12.3 - 15.4 %    Platelets 267 140 - 450 10*3/mm3    Neutrophil Rel % 52.5 42.7 - 76.0 %    Lymphocyte Rel % 35.6 19.6 - 45.3 %    Monocyte Rel % 9.5 5.0 - 12.0 %    Eosinophil Rel % 1.1 0.3 - 6.2 %    Basophil Rel % 1.1 0.0 - 1.5 %    Neutrophils Absolute 3.50 1.70 - 7.00 10*3/mm3    Lymphocytes Absolute 2.37 0.70 - 3.10 10*3/mm3    Monocytes Absolute 0.63 0.10 - 0.90 10*3/mm3    Eosinophils Absolute 0.07 0.00 - 0.40 10*3/mm3    Basophils Absolute 0.07 0.00 - 0.20 10*3/mm3    Immature Granulocyte Rel % 0.2 0.0 - 0.5 %    Immature Grans Absolute 0.01 0.00 - 0.05 10*3/mm3    nRBC 0.0 0.0 - 0.2 /100 WBC        Assessment and Plan  Diagnoses and all orders for this visit:    Annual physical exam  - Counseling was given to patient for the following topics:  disease prevention and importance of immunizations, including risks and benefits. Also discussed the importance of regular dental and vision care.  -     Comprehensive Metabolic Panel; Future  -     Lipid Panel; Future  -     Hemoglobin A1c; Future  -     TSH Rfx On Abnormal To Free T4; Future    Chronic migraine without aura without status migrainosus, not intractable  - Well controlled, continue maxalt PRN    Acne vulgaris  - Hx of facial cellulitis secondary to acne  - On aldactone and OCP to control acne    Normocytic anemia  - Stable, mild anemia   - Folate and B12 normal. Iron sat low.  - On MVI with iron  - Will recheck CBC    Well woman exam with routine gynecological exam  -     Ambulatory Referral to Obstetrics / Gynecology    Nausea and vomiting, unspecified vomiting type  - Will test for h pylori. Declines UPT, has not been sexually active in over a month and LMP 6/1.  - Start pepcid  40mg qhs for GERD  - Consider GI referral if not improving    Health Maintenance   Topic Date Due   • HPV VACCINES (1 - 2-dose series) Never done   • PAP SMEAR  Never done   • COVID-19 Vaccine (3 - Booster for Moderna series) 07/24/2021   • ANNUAL PHYSICAL  03/24/2022   • INFLUENZA VACCINE  10/01/2022   • TDAP/TD VACCINES (2 - Td or Tdap) 03/23/2031   • HEPATITIS C SCREENING  Completed   • Pneumococcal Vaccine 0-64  Aged Out     Health Maintenance  - Pap smear: She reports last pap smear 2018 at [facility]. Results normal. Referring to GYN.  - Mammogram: Start screening at age 40.  - Colonoscopy: Start screening at age 45.  - Immunizations: COVID complete, recommend booster. Tdap 2021.  - HCV: negative.  - Depression screening: negative 6/2022    Return in about 6 weeks (around 8/1/2022) for Follow up N/V. 1 year for annual., Labs today.

## 2022-06-21 ENCOUNTER — TELEPHONE (OUTPATIENT)
Dept: INTERNAL MEDICINE | Facility: CLINIC | Age: 27
End: 2022-06-21

## 2022-06-21 LAB — UREA BREATH TEST QL: NEGATIVE

## 2022-06-21 NOTE — TELEPHONE ENCOUNTER
THE PATIENT STATES  THAT SHE WAS SEEN 06/20/2022 AND PRESCRIBED FAMOTIDINE THE PATIENT STATES THAT THE PHARMACY TOLD HER THAT THE MEDICATION WILL NEED A PRIOR AUTHORIZATION THE PATIENT WOULD LIKE TO KNOW IF THAT WAS STARTED YET     CALL [phone]

## 2022-06-22 ENCOUNTER — PRIOR AUTHORIZATION (OUTPATIENT)
Dept: INTERNAL MEDICINE | Facility: CLINIC | Age: 27
End: 2022-06-22

## 2022-06-22 NOTE — TELEPHONE ENCOUNTER
Spoke to Pt and informed her that PA request has been sent in and just waiting on reply. Pt voiced understanding.

## 2022-06-22 NOTE — TELEPHONE ENCOUNTER
Received a fax for denial of famotidine. Information packet placed in your yellow folder for review.

## 2022-08-04 ENCOUNTER — OFFICE VISIT (OUTPATIENT)
Dept: INTERNAL MEDICINE | Facility: CLINIC | Age: 27
End: 2022-08-04

## 2022-08-04 VITALS
TEMPERATURE: 97.3 F | DIASTOLIC BLOOD PRESSURE: 68 MMHG | HEIGHT: 68 IN | HEART RATE: 60 BPM | SYSTOLIC BLOOD PRESSURE: 104 MMHG | WEIGHT: 116 LBS | BODY MASS INDEX: 17.58 KG/M2 | OXYGEN SATURATION: 100 %

## 2022-08-04 DIAGNOSIS — H69.83 EUSTACHIAN TUBE DYSFUNCTION, BILATERAL: ICD-10-CM

## 2022-08-04 DIAGNOSIS — K21.9 GASTROESOPHAGEAL REFLUX DISEASE WITHOUT ESOPHAGITIS: Primary | ICD-10-CM

## 2022-08-04 PROCEDURE — 99213 OFFICE O/P EST LOW 20 MIN: CPT | Performed by: INTERNAL MEDICINE

## 2022-08-04 NOTE — PROGRESS NOTES
"Internal Medicine Follow Up    Chief Complaint  Kelly Humphrey is a 27 y.o. female who presents today for follow up of chronic medical conditions outlined below.    Chief Complaint   Patient presents with   • Nausea     Follow up   • Vomiting     Follow up   • Earache     No drainage, no meds used        HPI  Ms. Humphrey comes in today for follow up on N/V. She also notes R earache x2 days associated with sinus symptoms. She is no longer having N/V with use of pepcid most days. She is getting the pepcid OTC as insurance did not cover it.       Review of Systems  Review of Systems   Constitutional: Negative.    HENT: Positive for ear pain, postnasal drip and rhinorrhea.    Gastrointestinal: Negative.         Current Medications  Current Outpatient Medications on File Prior to Visit   Medication Sig Dispense Refill   • famotidine (Pepcid) 40 MG tablet Take 1 tablet by mouth At Night As Needed for Indigestion or Heartburn. 90 tablet 1   • Multiple Vitamins-Iron (multivitamin with iron) tablet tablet Take  by mouth Daily.     • norgestimate-ethinyl estradiol (ORTHO-CYCLEN) 0.25-35 MG-MCG per tablet      • Probiotic Product (PROBIOTIC DAILY PO) Take  by mouth.     • rizatriptan (Maxalt) 10 MG tablet Take 1 tablet by mouth Daily As Needed for Migraine. May repeat in 2 hours if needed 12 tablet 1   • spironolactone (ALDACTONE) 100 MG tablet Take 100 mg by mouth Daily.     • [DISCONTINUED] Drospiren-Eth Estrad-Levomefol 3-0.02-0.451 MG tablet TAKE 1 TABLET BY MOUTH DAILY 28 tablet 5     No current facility-administered medications on file prior to visit.       Allergies  Allergies   Allergen Reactions   • Augmentin [Amoxicillin-Pot Clavulanate] Hives   • Vancomycin Hives and Itching       Objective  Visit Vitals  /68   Pulse 60   Temp 97.3 °F (36.3 °C)   Ht 172.7 cm (68\")   Wt 52.6 kg (116 lb)   SpO2 100%   BMI 17.64 kg/m²        Physical Exam  Physical Exam  Vitals and nursing note reviewed.   Constitutional:       " General: She is not in acute distress.     Appearance: She is well-developed. She is not ill-appearing or toxic-appearing.      Comments: Thin but well appearing   HENT:      Head: Normocephalic and atraumatic.      Right Ear: Ear canal and external ear normal. There is no impacted cerumen.      Left Ear: Ear canal and external ear normal. There is no impacted cerumen.      Ears:      Comments: Serous effusion bilaterally, no erythema or perforation  Eyes:      Conjunctiva/sclera: Conjunctivae normal.   Pulmonary:      Effort: Pulmonary effort is normal. No respiratory distress.   Abdominal:      General: There is no distension.   Skin:     General: Skin is warm and dry.   Neurological:      Mental Status: She is alert and oriented to person, place, and time. Mental status is at baseline.      Gait: Gait normal.   Psychiatric:         Mood and Affect: Mood normal.         Behavior: Behavior normal.         Results  Results for orders placed or performed in visit on 06/20/22   H. Pylori Breath Test - Breath, Lung    Specimen: Lung; Breath   Result Value Ref Range    H. pylori Breath Test Negative Negative   TSH Rfx On Abnormal To Free T4    Specimen: Blood   Result Value Ref Range    TSH 2.230 0.270 - 4.200 uIU/mL   Hemoglobin A1c    Specimen: Blood   Result Value Ref Range    Hemoglobin A1C 4.80 4.80 - 5.60 %   Lipid Panel    Specimen: Blood   Result Value Ref Range    Total Cholesterol 174 0 - 200 mg/dL    Triglycerides 90 0 - 150 mg/dL    HDL Cholesterol 56 40 - 60 mg/dL    LDL Cholesterol  101 (H) 0 - 100 mg/dL    VLDL Cholesterol 17 5 - 40 mg/dL    LDL/HDL Ratio 1.79    Comprehensive Metabolic Panel    Specimen: Blood   Result Value Ref Range    Glucose 77 65 - 99 mg/dL    BUN 15 6 - 20 mg/dL    Creatinine 0.80 0.57 - 1.00 mg/dL    Sodium 141 136 - 145 mmol/L    Potassium 4.2 3.5 - 5.2 mmol/L    Chloride 104 98 - 107 mmol/L    CO2 24.4 22.0 - 29.0 mmol/L    Calcium 9.2 8.6 - 10.5 mg/dL    Total Protein 6.4 6.0 -  8.5 g/dL    Albumin 4.20 3.50 - 5.20 g/dL    ALT (SGPT) 7 1 - 33 U/L    AST (SGOT) 19 1 - 32 U/L    Alkaline Phosphatase 26 (L) 39 - 117 U/L    Total Bilirubin 0.4 0.0 - 1.2 mg/dL    Globulin 2.2 gm/dL    A/G Ratio 1.9 g/dL    BUN/Creatinine Ratio 18.8 7.0 - 25.0    Anion Gap 12.6 5.0 - 15.0 mmol/L    eGFR 104.4 >60.0 mL/min/1.73   CBC Auto Differential    Specimen: Blood   Result Value Ref Range    WBC 6.30 3.40 - 10.80 10*3/mm3    RBC 3.98 3.77 - 5.28 10*6/mm3    Hemoglobin 12.0 12.0 - 15.9 g/dL    Hematocrit 36.5 34.0 - 46.6 %    MCV 91.7 79.0 - 97.0 fL    MCH 30.2 26.6 - 33.0 pg    MCHC 32.9 31.5 - 35.7 g/dL    RDW 13.4 12.3 - 15.4 %    RDW-SD 45.4 37.0 - 54.0 fl    MPV 11.8 6.0 - 12.0 fL    Platelets 251 140 - 450 10*3/mm3    Neutrophil % 43.4 42.7 - 76.0 %    Lymphocyte % 42.4 19.6 - 45.3 %    Monocyte % 11.0 5.0 - 12.0 %    Eosinophil % 2.4 0.3 - 6.2 %    Basophil % 0.6 0.0 - 1.5 %    Immature Grans % 0.2 0.0 - 0.5 %    Neutrophils, Absolute 2.74 1.70 - 7.00 10*3/mm3    Lymphocytes, Absolute 2.67 0.70 - 3.10 10*3/mm3    Monocytes, Absolute 0.69 0.10 - 0.90 10*3/mm3    Eosinophils, Absolute 0.15 0.00 - 0.40 10*3/mm3    Basophils, Absolute 0.04 0.00 - 0.20 10*3/mm3    Immature Grans, Absolute 0.01 0.00 - 0.05 10*3/mm3    nRBC 0.0 0.0 - 0.2 /100 WBC        Assessment and Plan  Diagnoses and all orders for this visit:    Gastroesophageal reflux disease without esophagitis  - Doing better, continue pepcid 20-40mg daily PRN    Eustachian tube dysfunction, bilateral  - Ear pain and serous effusion  - Recommend use of flonase or decongestant     Health Maintenance  - Pap smear: She reports last pap smear 2018 at [facility]. Results normal. Referring to GYN.  - Mammogram: Start screening at age 40.  - Colonoscopy: Start screening at age 45.  - Immunizations: COVID complete, recommend booster. Tdap 2021.  - HCV: negative.  - Depression screening: negative 6/2022    Return in about 6 months (around 2/4/2023) for  Follow up anemia.  Answers for HPI/ROS submitted by the patient on 8/4/2022  Please describe your symptoms.: Follow up from previous visit. Also potential ear infection  Have you had these symptoms before?: Yes  How long have you been having these symptoms?: 1-2 weeks  Please list any medications you are currently taking for this condition.: Famotidine  What is the primary reason for your visit?: Other

## 2023-01-10 ENCOUNTER — E-VISIT (OUTPATIENT)
Dept: FAMILY MEDICINE CLINIC | Facility: TELEHEALTH | Age: 28
End: 2023-01-10

## 2023-01-10 NOTE — E-VISIT ESCALATED
Patient escalated   Provider Mikki High chose to escalate patient to another level of care because: Insufficient information to diagnose   Patient was sent the following message:   Based on the information you've provided us, you need to take another step to get care.   What to do now:   Setup a video visit   Please, schedule your video visit   Video visit     You won't be charged for your eVisit. If you paid with a credit card, the charge will be reversed.   Chief Complaint: Migraine or headache   Patient introduction   Patient is 27-year-old female who has bilateral headache pain.   Warning. The following may warrant further investigation:    Pain is worse when patient bends over   Patient requests a 1-day excuse note.   General presentation   Patient's headache pain is unbearable. Headache has lasted 4 to 72 hours. Headache reached maximum intensity in 30 to 60 minutes. Headache pain described as band-like pressure, fullness, pressure, throbbing/pulsating, and tightness. Patient uncertain whether pain is worse with Valsalva maneuvers.   Pain is not brought on by intense physical activity. Also has fever and nasal congestion.   Migraine features: - Pain is accompanied by nausea, vomiting, photophobia, phonophobia, and decreased ability to perform regular daily activities. - In the 1 to 2 days leading up to headache, patient had neck stiffness. - Has visual aura just before or at the beginning of their headaches. - Has postdrome feeling drained or exhausted and pain in the location of the previous headache with sudden head movement. - No blurry vision with neck flexion, diminished peripheral vision, double vision, severe reduction in vision, halos, or complete vision loss in one eye.   Potential triggers: weather changes.   Patient has not made recent changes in hormonal birth control method.   Patient has not had similar headaches in the past. Patient has been diagnosed with migraine headaches.   No  known family history of migraine.   Prescription medications:   The following prescription medications were not helpful for headache symptoms:    Rizatriptan   Non-prescription medications:   Has not used any non-prescription medications for headache symptoms.   Review of red flags/alarm symptoms:    No exposure to carbon monoxide    No recent head trauma    No unexplained fever    No chronic night sweats    No prolonged, severe fatigue    No unintentional weight loss    No confusion    No impaired alertness    No altered speech    No loss of coordination    No prolonged limb numbness    No seizures    No diaphoresis or tachycardia    No current use of blood thinning medication    No exposure to toxic chemicals/ingredients   Self-exam:    No nuchal rigidity    Able to walk on toes/heels    Able to rise from a seated position without support    Pericranial muscle tenderness in the forehead and neck   Pregnancy/menstrual status/breastfeeding:   Not pregnant. Pre-eclampsia screening: Patient has not had a baby in last 6 weeks. Not breastfeeding. Regarding last menstrual period, patient writes: 12/27/2022.   Past medical history   Immune conditions: No immunocompromising conditions. No history of cancer.   No COPD, Lyme disease, obesity hypoventilation syndrome, or obstructive sleep apnea.   Social history   Patient does not smoke tobacco. No recreational drug use.   Current medications   Currently taking PROBIOTIC DAILY PO, multivitamin with iron tablet tablet, norgestimate-ethinyl estradiol 0.25-35 MG-MCG per tablet, and Biotin.   Medication allergies    Ibuprofen   Medication contraindication review   No history of ASA- or NSAID-induced asthma or urticaria, aspirin triad, bone marrow depression, phenothiazine-induced blood dyscrasia, catecholamine-releasing paraganglioma, coagulation disorder, depression, G6PD deficiency, GI bleeding, GI obstruction, uncontrolled hypertension, Parkinson's disease, Reye syndrome, or  seizure disorder.   No history of cardiac accessory conduction pathway disorder, cerebrovascular disease, coronary vasospasm, chronic kidney disease, hepatic impairment (severe), ischemic bowel disease, ischemic heart disease, migraine (basilar or hemiplegic), PVD, sepsis, vascular surgery (recent), or WPW syndrome.   Assessment:   Patient determined to need a level of care not appropriate to be delivered through eVisit.   Plan:   Patient informed of need to seek in-person care   ----------   Electronically signed by MIGUEL Pizarro on 2023-01-10 at 12:02PM   ----------   Patient Interview Transcript:   Where do you feel your headache pain? Select one.    On both sides of my head (or all over)   Not selected:    On one side of my head (including forehead or back of head)    I'm not sure   How long have you had this headache? Select one.    4 hours to 3 days   Not selected:    Less than 30 minutes    30 minutes to 3 hours    More than 3 days    I don't currently have a headache   How would you describe your headache? Select all that apply.    Band-like pressure or tightening    A feeling of head fullness    Pressure-like pain    It's throbbing or pulsating    It feels like I'm wearing a tight cap   Not selected:    Dull pain    None of the above   On a scale of 1 to 10, how severe is your headache pain? If you have a very severe headache, you may need to be seen in person. Select one.    Unbearable; intense pain that prevents me from doing anything   Not selected:    Mild; pain is noticeable and distracting, but I can do everyday activities    Moderate; strong pain that makes everyday activities harder    Severe; intense pain that prevents me from doing some everyday activities    The worst headache of my life   How long did it take for your headache to reach the greatest intensity? The pain of most headaches builds gradually. In rare cases, headache pain comes on suddenly, like a clap of thunder or an  explosion, and the pain reaches greatest intensity within 1 minute. Select one.    30 to 60 minutes   Not selected:    Less than 1 minute    1 to 5 minutes    5 to 30 minutes    Longer than 1 hour   Do you currently have any of these symptoms? Select all that apply.    Nausea    Vomiting    Sensitivity to light    Sensitivity to noise    Decreased ability to work, study, or do regular daily activities for at least one day   Not selected:    Headache pain that's worse with physical activity (walking or climbing stairs)    None of the above   Are any of these true for your headache? Select all that apply.    It's worse when I bend over   Not selected:    It's gotten better, but it hasn't gone away    It went away, but then it came back    It wakes me from sleep    It's worse in the morning    It's worse when I'm upright (and better when I'm lying down)    None of the above   Do you currently have any of these symptoms? Select all that apply.    Fever    Nasal congestion   Not selected:    Thick yellow or green nasal drainage (mucus)    Pain around the eyes or cheeks    None of these   Do you currently have any of these symptoms? Select all that apply.    None of the above   Not selected:    Confusion    Difficulty staying awake or alert    Difficulty speaking or understanding speech    Difficulty walking    Numbness, tingling, or weakness in any of your limbs that doesn't go away    Seizures    Severe jaw pain    Painful rash that looks like a band of blisters on the same side of your head or face as your headache   Headaches can be a sign of a serious underlying condition. Within the last month, have you had any of these symptoms? Select all that apply.    None of the above   Not selected:    Unexplained fever (no clear source or obvious infection)    Recurring night sweats    Severe fatigue that doesn't improve with rest    Unintentional weight loss   Have you had any of these vision changes? Select all that  "apply.    None of the above   Not selected:    Blurry vision when moving your chin toward your chest    Loss of peripheral vision (can't see out of the corners of the eyes)    Double vision    Severe difficulty seeing    Seeing halos (bright circles) around lights    Complete loss of vision in one eye   Sore or tight muscles in the neck and shoulders can play a part in causing headaches. Gently press against your head and neck muscles, making small circular movements on the muscles. Do this on both your right and left sides.    Forehead    Neck   Not selected:    Jaw    Back of head    Upper shoulder    None of the above   Is your headache pain worse with Valsalva maneuvers? A common Valsalva maneuver involves closing your mouth, plugging your nose shut, and breathing out gently in a way that \"pops\" your ears. Bearing down as if having a bowel movement is another example of a Valsalva maneuver. Select one.    I'm not sure   Not selected:    Yes    No   Can you move your chin toward your chest?    Yes   Not selected:    No, my neck is too stiff   We'd like you to perform three simple tasks: 1. Get up from a seated position without using your arms (or anyone's help) to push yourself up 2. Walk on your toes for 10 steps 3. Walk on your heels for 10 steps Can you perform all three tasks? Select one.    Yes   Not selected:    No, I can't do one or more of the tasks   Do any of these seem to trigger your headache symptoms? Select all that apply.    Weather changes   Not selected:    Alcohol    Artificial sweeteners (aspartame)    Bright lights or visual stimuli    Certain foods, such as cured meats or cheese    Certain smells    Changes in sleep pattern    Hunger    Stress    No, not that I know of   Does your headache only occur during or after intense physical activity (jogging, weight lifting, or having sex)? Select one.    No   Not selected:    Yes   In the last 2 weeks, have you had any kind of injury or trauma to " your head? Examples of injuries might include hitting your head or being hit in the head. Select one.    No   Not selected:    Yes   In the last month, have you had any known exposure to toxic chemicals? For example, this might include accidentally swallowing antifreeze or inhaling paint fumes. Select one.    No, not that I know of   Not selected:    Yes   Have you had any recent exposure to carbon monoxide? Common sources of carbon monoxide include motor vehicle exhaust, smoke from fires, engine fumes, and non-electric heaters. Select one.    No, not that I know of   Not selected:    Yes   Have you ever had a similar headache in the past? By similar, we mean the location of the pain is the same, the headache feels the same, and the associated symptoms are the same. Select one.    No   Not selected:    Yes   Have you ever seen a healthcare provider for your headaches? Select one.    Yes   Not selected:    No   When you saw the healthcare provider, was your headache diagnosed as any of these? Select all that apply.    Migraine headache   Not selected:    Cluster headache    Tension-type headache    Other (specify)   Do you have a family history of migraine headaches? Select one.    No, not that I know of   Not selected:    Yes   In the 1 to 2 days leading up to your headaches, do you have any of these symptoms? Select all that apply.    Neck stiffness   Not selected:    Constipation    Depression    Euphoria (feeling intensely excited or happy)    Food cravings    Increased yawning    Irritability    No, not that I've noticed   Just before or at the beginning of your headaches, do you have any of these symptoms? Some headaches can be associated with other sensory or physical symptoms. They usually last no longer than an hour and then go away completely. Select all that apply.    Seeing bright lines, shapes, or objects (visual aura)   Not selected:    Ringing in your ears or hearing other noises or music (auditory  "aura)    Jerking or other repetitive physical movements (motor aura)    Loss of vision, hearing, feeling, or ability to move a body part (negative aura)    Burning, pain, or tingling on any part of your body (somatosensory aura)    Difficulty finding words or other changes in speech    None of the above   Once the headache pain goes away, do you have any of these symptoms? Select all that apply.    Feeling drained or exhausted    Pain in the location of the previous headache with sudden head movement   Not selected:    Mild euphoria (excitement or happiness)    None of the above   Are your headaches associated with \"attacks\" or \"spells\" of excessive sweating or fast heart rate? Select all that apply.    No, neither of these   Not selected:    Excessive sweating    Fast heart rate   To make a treatment plan that's safe for you, we need to ask about your health history. Do you regularly take any blood thinning medications? Examples include aspirin, Coumadin (warfarin), Aggrenox (aspirin/dipyridamole), Plavix (clopidogrel), Pradaxa (dabigatran), Xarelto (rivaroxaban), Eliquis (apixaban) and Savaysa (edoxaban). Select one.    No   Not selected:    Yes   Do you smoke tobacco? Select one.    No, never   Not selected:    Yes, every day    Yes, some days    No, I quit   In the last month, have you used amphetamines, cocaine, hallucinogens, heroin, or other opioids? Your response to this question will be shared with your care provider only. Select one.    No   Not selected:    Yes   Do you have any of these conditions? Select all that apply.    None of the above   Not selected:    Chronic obstructive pulmonary disease (COPD)    Lyme disease    Obesity hypoventilation syndrome (also known as Pickwickian syndrome)    Obstructive sleep apnea   Do you have any of these conditions? Scroll to see all options. Select all that apply.    None of the above   Not selected:    Aspirin- or NSAID-induced asthma or urticaria    Aspirin " triad, Samter's triad, or aspirin-exacerbated respiratory disease (AERD)    Bone marrow depression    Phenothiazine-induced anemia, leukemia, lymphoma, or myeloma    Catecholamine-releasing paraganglioma    Blood clotting disorder    Depression    G6PD deficiency    Gastrointestinal (GI) bleeding    Gastrointestinal (GI) obstruction    Uncontrolled hypertension    Parkinson's disease    Reye syndrome    Seizure disorder   Do you have any of these conditions that can affect the immune system? Scroll to see all options. Select all that apply.    None of these   Not selected:    History of bone marrow transplant    Chronic kidney disease    Chronic liver disease (including cirrhosis)    HIV/AIDS    Inflammatory bowel disease (Crohn's disease or ulcerative colitis)    Lupus    Moderate to severe plaque psoriasis    Multiple sclerosis    Rheumatoid arthritis    Sickle cell anemia    Alpha or beta thalassemia    History of solid organ transplant (kidney, liver, or heart)    History of spleen removal    An autoimmune disorder not listed here    A condition requiring treatment with long-term use of oral steroids (such as prednisone, prednisolone, or dexamethasone)   Have you ever been diagnosed with cancer? Select one.    No   Not selected:    Yes, I have cancer now    Yes, but I'm in remission   Have you had any of these conditions or procedures? Scroll to see all options. Select all that apply.    None of the above   Not selected:    Cardiac accessory conduction pathway disorder-associated arrhythmia    Cerebrovascular disease (stroke or TIA)    Coronary vasospasm (angina)    Ischemic bowel disease    Ischemic heart disease (history of myocardial infarction or heart attack)    Kidney disease (chronic)    Liver disease    Metoclopramide-associated dystonic reaction    Migraine diagnosed as basilar or hemiplegic    Peripheral vascular disease (PVD)    Sepsis    Tardive dyskinesia    Vascular surgery (recent)     Amado-Parkinson-White (WPW) syndrome   Are you pregnant? Select one.    No   Not selected:    Yes   Have you had a baby within the last 6 weeks? Select one.    No   Not selected:    Yes   When was your last menstrual period? If you don't currently have periods or no longer have periods, please briefly explain.    12/27/2022   Are you breastfeeding? Select one.    No   Not selected:    Yes   Have you recently made any changes to your hormonal birth control method (pill, ring, implant, injection, IUD with progesterone)? Select one.    No   Not selected:    Started using hormonal birth control    Stopped using hormonal birth control    Switched hormonal birth control methods    I don't use hormonal birth control   Have you used any non-prescription medications for your headache symptoms? Select one.    No   Not selected:    Yes   Have you used any prescription medications for your headache symptoms? Select one.    Yes   Not selected:    No   Rizatriptan (Maxalt)    Not helpful   Not selected:    Helpful   Are you still taking these medications listed in your medical record? If you're not taking any of these, click Next. Select all that apply.    PROBIOTIC DAILY PO    multivitamin with iron tablet tablet    norgestimate-ethinyl estradiol 0.25-35 MG-MCG per tablet   Are you taking any other medications, vitamins, or supplements? Select one.    Yes   Not selected:    No   Have you ever had an allergic or bad reaction to any medication? Select one.    Yes   Not selected:    No   Have you had an allergic or bad reaction to any of these medications? Select all that apply.    None of the above   Not selected:    Triptans, such as sumatriptan (Imitrex), zolmitriptan (Zomig), eletriptan (Relpax), naratriptan (Amerge), or rizatriptan (Maxalt)    Prochlorperazine (Compazine)    Metoclopramide (Reglan)    Medications containing naproxen (Naprosyn, Aleve)    Dihydroergotamine   Have you had an allergic or bad reaction to any of  these non-prescription medications? Select all that apply.    Ibuprofen (Advil, Motrin)   Not selected:    Acetaminophen (Tylenol)    Aspirin    Excedrin Extra Strength or Excedrin Migraine (acetaminophen/aspirin/caffeine)    Naproxen or naproxen sodium (Aleve)    None of the above   Do you need a doctor's note? A doctor's note confirms that you received care today and states when you can return to school or work. It does not contain information about your diagnosis or treatment plan. Your provider will make the final decision on whether to give you a doctor's note. Doctor's notes CANNOT be backdated. Select one.    Today only (1 day)   Not selected:    Today and tomorrow (2 days)    3 days    No   Is there anything else you'd like to tell us about your symptoms?   The patient did not enter any additional information.   ----------   Medical history   Medical history data does not currently exist for this patient.

## 2023-02-06 ENCOUNTER — OFFICE VISIT (OUTPATIENT)
Dept: INTERNAL MEDICINE | Facility: CLINIC | Age: 28
End: 2023-02-06
Payer: COMMERCIAL

## 2023-02-06 ENCOUNTER — LAB (OUTPATIENT)
Dept: LAB | Facility: HOSPITAL | Age: 28
End: 2023-02-06
Payer: COMMERCIAL

## 2023-02-06 VITALS
BODY MASS INDEX: 17.73 KG/M2 | OXYGEN SATURATION: 100 % | DIASTOLIC BLOOD PRESSURE: 68 MMHG | TEMPERATURE: 98 F | HEART RATE: 82 BPM | HEIGHT: 68 IN | WEIGHT: 117 LBS | SYSTOLIC BLOOD PRESSURE: 112 MMHG

## 2023-02-06 DIAGNOSIS — K21.9 GASTROESOPHAGEAL REFLUX DISEASE WITHOUT ESOPHAGITIS: Primary | ICD-10-CM

## 2023-02-06 DIAGNOSIS — D64.9 NORMOCYTIC ANEMIA: ICD-10-CM

## 2023-02-06 DIAGNOSIS — G43.709 CHRONIC MIGRAINE WITHOUT AURA WITHOUT STATUS MIGRAINOSUS, NOT INTRACTABLE: ICD-10-CM

## 2023-02-06 LAB
DEPRECATED RDW RBC AUTO: 44 FL (ref 37–54)
ERYTHROCYTE [DISTWIDTH] IN BLOOD BY AUTOMATED COUNT: 13.3 % (ref 12.3–15.4)
FERRITIN SERPL-MCNC: 17.3 NG/ML (ref 13–150)
HCT VFR BLD AUTO: 36.3 % (ref 34–46.6)
HGB BLD-MCNC: 12.2 G/DL (ref 12–15.9)
IRON 24H UR-MRATE: 57 MCG/DL (ref 37–145)
IRON SATN MFR SERPL: 9 % (ref 20–50)
MCH RBC QN AUTO: 30.5 PG (ref 26.6–33)
MCHC RBC AUTO-ENTMCNC: 33.6 G/DL (ref 31.5–35.7)
MCV RBC AUTO: 90.8 FL (ref 79–97)
PLATELET # BLD AUTO: 292 10*3/MM3 (ref 140–450)
PMV BLD AUTO: 11.7 FL (ref 6–12)
RBC # BLD AUTO: 4 10*6/MM3 (ref 3.77–5.28)
TIBC SERPL-MCNC: 629 MCG/DL (ref 298–536)
TRANSFERRIN SERPL-MCNC: 422 MG/DL (ref 200–360)
WBC NRBC COR # BLD: 6.99 10*3/MM3 (ref 3.4–10.8)

## 2023-02-06 PROCEDURE — 85027 COMPLETE CBC AUTOMATED: CPT

## 2023-02-06 PROCEDURE — 99214 OFFICE O/P EST MOD 30 MIN: CPT | Performed by: INTERNAL MEDICINE

## 2023-02-06 PROCEDURE — 83540 ASSAY OF IRON: CPT

## 2023-02-06 PROCEDURE — 82728 ASSAY OF FERRITIN: CPT

## 2023-02-06 PROCEDURE — 84466 ASSAY OF TRANSFERRIN: CPT

## 2023-02-06 RX ORDER — PANTOPRAZOLE SODIUM 40 MG/1
40 TABLET, DELAYED RELEASE ORAL DAILY
Qty: 90 TABLET | Refills: 0 | Status: SHIPPED | OUTPATIENT
Start: 2023-02-06

## 2023-02-06 RX ORDER — LACTOBACILLUS RHAMNOSUS GG 10B CELL
CAPSULE ORAL DAILY
COMMUNITY

## 2023-02-06 NOTE — PROGRESS NOTES
Internal Medicine Follow Up    Chief Complaint  Kelly Humphrey is a 27 y.o. female who presents today for follow up of chronic medical conditions outlined below.    Chief Complaint   Patient presents with   • Anemia     Follow up        HPI  Ms. Humphrey comes in today for follow up. Had a severe headache with N/V almost a month ago. Described the pain as squeezing. She did not get relief with maxalt and almost went to the ED. She eventually got relief. Has not had recurrence since then but notes that she believes she had a viral infection at the time as her significant other was diagnosed with viral gastroenteritis around that time. She continues to have nausea and had a small amount of emesis today consisting of stomach fluid. She is taking pepcid. She denies constipation, diarrhea, blood in stools. She is not certain but thinks her GI sxs could have started after hospitalization and IV abx for facial cellulitis. She is also not sure if iron is making it worse.        Review of Systems  Review of Systems   Constitutional: Negative.    Respiratory: Negative.    Cardiovascular: Negative.    Gastrointestinal: Positive for nausea, vomiting, GERD and indigestion. Negative for abdominal pain, anal bleeding, blood in stool, constipation and diarrhea.   Neurological: Positive for headache.        Current Medications  Current Outpatient Medications on File Prior to Visit   Medication Sig Dispense Refill   • azithromycin (ZITHROMAX) 250 MG tablet 2 tabs day 1, 1 tab days 2 through 5 6 tablet 0   • Multiple Vitamins-Iron (multivitamin with iron) tablet tablet Take  by mouth Daily.     • norgestimate-ethinyl estradiol (ORTHO-CYCLEN) 0.25-35 MG-MCG per tablet      • probiotic (CULTURELLE) capsule capsule Take  by mouth Daily.     • Probiotic Product (PROBIOTIC DAILY PO) Take  by mouth.     • rizatriptan (Maxalt) 10 MG tablet Take 1 tablet by mouth Daily As Needed for Migraine. May repeat in 2 hours if needed 12 tablet 1   •  "spironolactone (ALDACTONE) 100 MG tablet Take 100 mg by mouth Daily.     • [DISCONTINUED] famotidine (Pepcid) 40 MG tablet Take 1 tablet by mouth At Night As Needed for Indigestion or Heartburn. 90 tablet 1   • [DISCONTINUED] Drospiren-Eth Estrad-Levomefol 3-0.02-0.451 MG tablet TAKE 1 TABLET BY MOUTH DAILY 28 tablet 5     No current facility-administered medications on file prior to visit.       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   Constitutional:       General: She is not in acute distress.     Appearance: Normal appearance. She is well-developed. She is not ill-appearing or toxic-appearing.   HENT:      Head: Normocephalic and atraumatic.   Eyes:      Conjunctiva/sclera: Conjunctivae normal.   Cardiovascular:      Rate and Rhythm: Normal rate and regular rhythm.      Heart sounds: Normal heart sounds.   Pulmonary:      Effort: Pulmonary effort is normal. No respiratory distress.      Breath sounds: Normal breath sounds.   Abdominal:      General: Bowel sounds are normal. There is no distension.      Palpations: Abdomen is soft. There is no mass.      Tenderness: There is no abdominal tenderness.   Musculoskeletal:      Right lower leg: No edema.      Left lower leg: No edema.   Skin:     General: Skin is warm and dry.   Neurological:      General: No focal deficit present.      Mental Status: She is alert and oriented to person, place, and time. Mental status is at baseline.      Gait: Gait normal.         Results  Results for orders placed or performed in visit on 06/20/22   H. Pylori Breath Test - Breath, Lung    Specimen: " Lung; Breath   Result Value Ref Range    H. pylori Breath Test Negative Negative   TSH Rfx On Abnormal To Free T4    Specimen: Blood   Result Value Ref Range    TSH 2.230 0.270 - 4.200 uIU/mL   Hemoglobin A1c    Specimen: Blood   Result Value Ref Range    Hemoglobin A1C 4.80 4.80 - 5.60 %   Lipid Panel    Specimen: Blood   Result Value Ref Range    Total Cholesterol 174 0 - 200 mg/dL    Triglycerides 90 0 - 150 mg/dL    HDL Cholesterol 56 40 - 60 mg/dL    LDL Cholesterol  101 (H) 0 - 100 mg/dL    VLDL Cholesterol 17 5 - 40 mg/dL    LDL/HDL Ratio 1.79    Comprehensive Metabolic Panel    Specimen: Blood   Result Value Ref Range    Glucose 77 65 - 99 mg/dL    BUN 15 6 - 20 mg/dL    Creatinine 0.80 0.57 - 1.00 mg/dL    Sodium 141 136 - 145 mmol/L    Potassium 4.2 3.5 - 5.2 mmol/L    Chloride 104 98 - 107 mmol/L    CO2 24.4 22.0 - 29.0 mmol/L    Calcium 9.2 8.6 - 10.5 mg/dL    Total Protein 6.4 6.0 - 8.5 g/dL    Albumin 4.20 3.50 - 5.20 g/dL    ALT (SGPT) 7 1 - 33 U/L    AST (SGOT) 19 1 - 32 U/L    Alkaline Phosphatase 26 (L) 39 - 117 U/L    Total Bilirubin 0.4 0.0 - 1.2 mg/dL    Globulin 2.2 gm/dL    A/G Ratio 1.9 g/dL    BUN/Creatinine Ratio 18.8 7.0 - 25.0    Anion Gap 12.6 5.0 - 15.0 mmol/L    eGFR 104.4 >60.0 mL/min/1.73   CBC Auto Differential    Specimen: Blood   Result Value Ref Range    WBC 6.30 3.40 - 10.80 10*3/mm3    RBC 3.98 3.77 - 5.28 10*6/mm3    Hemoglobin 12.0 12.0 - 15.9 g/dL    Hematocrit 36.5 34.0 - 46.6 %    MCV 91.7 79.0 - 97.0 fL    MCH 30.2 26.6 - 33.0 pg    MCHC 32.9 31.5 - 35.7 g/dL    RDW 13.4 12.3 - 15.4 %    RDW-SD 45.4 37.0 - 54.0 fl    MPV 11.8 6.0 - 12.0 fL    Platelets 251 140 - 450 10*3/mm3    Neutrophil % 43.4 42.7 - 76.0 %    Lymphocyte % 42.4 19.6 - 45.3 %    Monocyte % 11.0 5.0 - 12.0 %    Eosinophil % 2.4 0.3 - 6.2 %    Basophil % 0.6 0.0 - 1.5 %    Immature Grans % 0.2 0.0 - 0.5 %    Neutrophils, Absolute 2.74 1.70 - 7.00 10*3/mm3    Lymphocytes, Absolute 2.67 0.70 - 3.10 10*3/mm3     Monocytes, Absolute 0.69 0.10 - 0.90 10*3/mm3    Eosinophils, Absolute 0.15 0.00 - 0.40 10*3/mm3    Basophils, Absolute 0.04 0.00 - 0.20 10*3/mm3    Immature Grans, Absolute 0.01 0.00 - 0.05 10*3/mm3    nRBC 0.0 0.0 - 0.2 /100 WBC        Assessment and Plan  Diagnoses and all orders for this visit:    Gastroesophageal reflux disease without esophagitis  - still with nausea and occasional vomiting  - start pantoprazole 40mg daily x1 month  - if not improved will need EGD    Normocytic anemia  - Mild anemia has resolved with iron supplement  - On MVI with iron  - Will recheck CBC, iron studies    Chronic migraine without aura without status migrainosus, not intractable  - typically well controlled with infrequent use of maxalt. One atypical severe headache potentially with underlying viral illness. Has not had similar headaches since then.  - will continue maxalt PRN     Health Maintenance  - Pap smear: She reports last pap smear 2018 at [facility]. Results normal. Referring to GYN.  - Mammogram: Start screening at age 40.  - Colonoscopy: Start screening at age 45.  - Immunizations: COVID and flu deferred. Tdap 2021.  - HCV: negative.  - Depression screening: negative 6/2022    Return for Next scheduled follow up, Labs today.

## 2023-05-04 DIAGNOSIS — K21.9 GASTROESOPHAGEAL REFLUX DISEASE WITHOUT ESOPHAGITIS: ICD-10-CM

## 2023-05-04 RX ORDER — PANTOPRAZOLE SODIUM 40 MG/1
40 TABLET, DELAYED RELEASE ORAL DAILY
Qty: 90 TABLET | Refills: 0 | Status: SHIPPED | OUTPATIENT
Start: 2023-05-04

## 2023-06-12 DIAGNOSIS — G43.709 CHRONIC MIGRAINE WITHOUT AURA WITHOUT STATUS MIGRAINOSUS, NOT INTRACTABLE: ICD-10-CM

## 2023-06-13 RX ORDER — RIZATRIPTAN BENZOATE 10 MG/1
10 TABLET ORAL DAILY PRN
Qty: 12 TABLET | Refills: 1 | Status: SHIPPED | OUTPATIENT
Start: 2023-06-13

## 2023-10-12 ENCOUNTER — OFFICE VISIT (OUTPATIENT)
Dept: INTERNAL MEDICINE | Facility: CLINIC | Age: 28
End: 2023-10-12
Payer: COMMERCIAL

## 2023-10-12 ENCOUNTER — LAB (OUTPATIENT)
Dept: LAB | Facility: HOSPITAL | Age: 28
End: 2023-10-12
Payer: COMMERCIAL

## 2023-10-12 VITALS
HEIGHT: 68 IN | HEART RATE: 74 BPM | DIASTOLIC BLOOD PRESSURE: 74 MMHG | SYSTOLIC BLOOD PRESSURE: 120 MMHG | TEMPERATURE: 98.7 F | WEIGHT: 118 LBS | BODY MASS INDEX: 17.88 KG/M2 | OXYGEN SATURATION: 98 %

## 2023-10-12 DIAGNOSIS — Z00.00 ANNUAL PHYSICAL EXAM: ICD-10-CM

## 2023-10-12 DIAGNOSIS — D64.9 NORMOCYTIC ANEMIA: ICD-10-CM

## 2023-10-12 DIAGNOSIS — R59.9 ADENOPATHY: ICD-10-CM

## 2023-10-12 DIAGNOSIS — F90.9 ATTENTION DEFICIT HYPERACTIVITY DISORDER (ADHD), UNSPECIFIED ADHD TYPE: ICD-10-CM

## 2023-10-12 DIAGNOSIS — Z00.00 ANNUAL PHYSICAL EXAM: Primary | ICD-10-CM

## 2023-10-12 DIAGNOSIS — L70.0 ACNE VULGARIS: ICD-10-CM

## 2023-10-12 DIAGNOSIS — G43.709 CHRONIC MIGRAINE WITHOUT AURA WITHOUT STATUS MIGRAINOSUS, NOT INTRACTABLE: ICD-10-CM

## 2023-10-12 DIAGNOSIS — K21.9 GASTROESOPHAGEAL REFLUX DISEASE WITHOUT ESOPHAGITIS: ICD-10-CM

## 2023-10-12 DIAGNOSIS — F41.9 ANXIETY: ICD-10-CM

## 2023-10-12 DIAGNOSIS — R63.6 UNDERWEIGHT: ICD-10-CM

## 2023-10-12 LAB
BASOPHILS # BLD AUTO: 0.05 10*3/MM3 (ref 0–0.2)
BASOPHILS NFR BLD AUTO: 0.8 % (ref 0–1.5)
DEPRECATED RDW RBC AUTO: 44.7 FL (ref 37–54)
EOSINOPHIL # BLD AUTO: 0.18 10*3/MM3 (ref 0–0.4)
EOSINOPHIL NFR BLD AUTO: 2.8 % (ref 0.3–6.2)
ERYTHROCYTE [DISTWIDTH] IN BLOOD BY AUTOMATED COUNT: 13.3 % (ref 12.3–15.4)
HBA1C MFR BLD: 5.1 % (ref 4.8–5.6)
HCT VFR BLD AUTO: 36.5 % (ref 34–46.6)
HGB BLD-MCNC: 12.3 G/DL (ref 12–15.9)
IMM GRANULOCYTES # BLD AUTO: 0.01 10*3/MM3 (ref 0–0.05)
IMM GRANULOCYTES NFR BLD AUTO: 0.2 % (ref 0–0.5)
LYMPHOCYTES # BLD AUTO: 2.34 10*3/MM3 (ref 0.7–3.1)
LYMPHOCYTES NFR BLD AUTO: 36.3 % (ref 19.6–45.3)
MCH RBC QN AUTO: 30.8 PG (ref 26.6–33)
MCHC RBC AUTO-ENTMCNC: 33.7 G/DL (ref 31.5–35.7)
MCV RBC AUTO: 91.3 FL (ref 79–97)
MONOCYTES # BLD AUTO: 0.9 10*3/MM3 (ref 0.1–0.9)
MONOCYTES NFR BLD AUTO: 14 % (ref 5–12)
NEUTROPHILS NFR BLD AUTO: 2.97 10*3/MM3 (ref 1.7–7)
NEUTROPHILS NFR BLD AUTO: 45.9 % (ref 42.7–76)
NRBC BLD AUTO-RTO: 0 /100 WBC (ref 0–0.2)
PLATELET # BLD AUTO: 277 10*3/MM3 (ref 140–450)
PMV BLD AUTO: 11.3 FL (ref 6–12)
RBC # BLD AUTO: 4 10*6/MM3 (ref 3.77–5.28)
WBC NRBC COR # BLD: 6.45 10*3/MM3 (ref 3.4–10.8)

## 2023-10-12 PROCEDURE — 84466 ASSAY OF TRANSFERRIN: CPT

## 2023-10-12 PROCEDURE — 80061 LIPID PANEL: CPT

## 2023-10-12 PROCEDURE — 82728 ASSAY OF FERRITIN: CPT

## 2023-10-12 PROCEDURE — 83540 ASSAY OF IRON: CPT

## 2023-10-12 PROCEDURE — 80050 GENERAL HEALTH PANEL: CPT

## 2023-10-12 PROCEDURE — 83036 HEMOGLOBIN GLYCOSYLATED A1C: CPT

## 2023-10-12 RX ORDER — MULTIPLE VITAMINS W/ MINERALS TAB 9MG-400MCG
1 TAB ORAL DAILY
COMMUNITY

## 2023-10-12 RX ORDER — UREA 10 %
LOTION (ML) TOPICAL
COMMUNITY

## 2023-10-12 RX ORDER — FLUOXETINE HYDROCHLORIDE 20 MG/1
20 CAPSULE ORAL EVERY OTHER DAY
COMMUNITY

## 2023-10-12 NOTE — PROGRESS NOTES
Internal Medicine Annual Exam  Kelly Humphrey is a 28 y.o. female who presents today for an annual exam and with concerns as outlined below.    Chief Complaint  Chief Complaint   Patient presents with    Annual Exam    Adenopathy        HPI  Ms. Humphrey comes in today for her physical. 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 She has established with an online psychiatrist and has started on prozac 10mg every other day for anxiety. She continues to control occasional migraine with maxalt. She has stopped OCP. Using condoms for contraception. Since stopping OCP she did menstruate in early September but has not yet had cycle this month. Has not been sexually active.  She needs GYN exam. Had it scheduled but provider cancelled. She now has Thursday off so plans to reschedule. She is up to date with dental care. Had implant in the spring. She will get flu shot at work and we discussed COVID booster today. Denies use of tobacco, ecigarettes, illicit drugs, and alcohol.    She notes a lump behind L ear x1m. No associated URI sxs, rash on scalp or head/neck. It has not changed in size and is slightly tender.         Review of Systems  Review of Systems   Constitutional: Negative.    HENT: Negative.     Eyes: Negative.    Respiratory: Negative.     Cardiovascular: Negative.    Gastrointestinal: Negative.    Genitourinary:  Positive for menstrual problem.   Musculoskeletal: Negative.    Skin: Negative.    Neurological: Negative.    Hematological:  Positive for adenopathy.   Psychiatric/Behavioral:  The patient is nervous/anxious.         Past Medical History  Past Medical History:   Diagnosis Date    ADHD (attention deficit hyperactivity disorder)     Anxiety     Facial cellulitis 11/15/2021    Post concussion syndrome 2/15/2021        Surgical History  Past Surgical History:   Procedure Laterality Date    TONSILLECTOMY  2017        Family History  Family History   Adopted: Yes   Problem Relation Age of Onset     "Kidney disease Other         Social History  Social History     Socioeconomic History    Marital status: Single   Tobacco Use    Smoking status: Never    Smokeless tobacco: Never   Vaping Use    Vaping Use: Never used   Substance and Sexual Activity    Alcohol use: Not Currently    Drug use: No    Sexual activity: Yes     Partners: Male     Birth control/protection: Condom     Comment: single, steady partner        Current Medications  Current Outpatient Medications on File Prior to Visit   Medication Sig Dispense Refill    rizatriptan (Maxalt) 10 MG tablet Take 1 tablet by mouth Daily As Needed for Migraine. May repeat in 2 hours if needed 12 tablet 1    [DISCONTINUED] ferrous sulfate 325 (65 FE) MG tablet Take 1 tablet by mouth Daily With Breakfast.      [DISCONTINUED] Multiple Vitamins-Iron (multivitamin with iron) tablet tablet Take  by mouth Daily.      COLLAGEN PO Take  by mouth.      ferrous sulfate 140 (45 Fe) MG tablet controlled-release tablet Take  by mouth Daily With Breakfast.      FLUoxetine (PROzac) 20 MG capsule Take 1 capsule by mouth Every Other Day.      multivitamin with minerals tablet tablet Take 1 tablet by mouth Daily.      [DISCONTINUED] Drospiren-Eth Estrad-Levomefol 3-0.02-0.451 MG tablet TAKE 1 TABLET BY MOUTH DAILY 28 tablet 5    [DISCONTINUED] norgestimate-ethinyl estradiol (ORTHO-CYCLEN) 0.25-35 MG-MCG per tablet        No current facility-administered medications on file prior to visit.       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   Constitutional:       General: She is not in acute " distress.     Appearance: Normal appearance. She is well-developed. She is not ill-appearing, toxic-appearing or diaphoretic.   HENT:      Head: Normocephalic and atraumatic.      Right Ear: Tympanic membrane, ear canal and external ear normal.      Left Ear: Tympanic membrane, ear canal and external ear normal.      Nose: Nose normal.   Eyes:      General: No scleral icterus.     Conjunctiva/sclera: Conjunctivae normal.   Neck:      Thyroid: No thyromegaly.   Cardiovascular:      Rate and Rhythm: Normal rate and regular rhythm.      Heart sounds: Normal heart sounds. No murmur heard.  Pulmonary:      Effort: Pulmonary effort is normal. No respiratory distress.      Breath sounds: Normal breath sounds.   Abdominal:      General: There is no distension.      Palpations: Abdomen is soft. There is no mass.      Tenderness: There is no abdominal tenderness.   Musculoskeletal:         General: No deformity.      Cervical back: Neck supple.      Right lower leg: No edema.      Left lower leg: No edema.   Lymphadenopathy:      Head:      Left side of head: Posterior auricular adenopathy present.   Skin:     General: Skin is warm and dry.      Findings: No rash.   Neurological:      Mental Status: She is alert and oriented to person, place, and time. Mental status is at baseline.      Gait: Gait normal.   Psychiatric:         Mood and Affect: Mood normal.         Behavior: Behavior normal.         Thought Content: Thought content normal.         Judgment: Judgment normal.          Results  Results for orders placed or performed in visit on 02/06/23   CBC (No Diff)    Specimen: Blood   Result Value Ref Range    WBC 6.99 3.40 - 10.80 10*3/mm3    RBC 4.00 3.77 - 5.28 10*6/mm3    Hemoglobin 12.2 12.0 - 15.9 g/dL    Hematocrit 36.3 34.0 - 46.6 %    MCV 90.8 79.0 - 97.0 fL    MCH 30.5 26.6 - 33.0 pg    MCHC 33.6 31.5 - 35.7 g/dL    RDW 13.3 12.3 - 15.4 %    RDW-SD 44.0 37.0 - 54.0 fl    MPV 11.7 6.0 - 12.0 fL    Platelets 292 140  - 450 10*3/mm3   Iron Profile    Specimen: Blood   Result Value Ref Range    Iron 57 37 - 145 mcg/dL    Iron Saturation (TSAT) 9 (L) 20 - 50 %    Transferrin 422 (H) 200 - 360 mg/dL    TIBC 629 (H) 298 - 536 mcg/dL   Ferritin    Specimen: Blood   Result Value Ref Range    Ferritin 17.30 13.00 - 150.00 ng/mL        Assessment and Plan  Diagnoses and all orders for this visit:    Annual physical exam  - Counseling was given to patient for the following topics:  disease prevention and importance of immunizations, including risks and benefits. Also discussed the importance of regular dental and vision care.  -     Comprehensive Metabolic Panel; Future  -     Lipid Panel; Future  -     Hemoglobin A1c; Future  -     TSH Rfx On Abnormal To Free T4; Future  -     CBC & Differential; Future    Attention deficit hyperactivity disorder (ADHD), unspecified ADHD type  - no current treatment    Chronic migraine without aura without status migrainosus, not intractable  - stable with maybe 1 migraine a month that she treats with maxalt PRN    Gastroesophageal reflux disease without esophagitis  - currently asymptomatic and off PPI    Normocytic anemia  - has resolved with iron supplementation, continue iron and update labs today    Underweight  - weight has been stable    Acne vulgaris  - off OCP and aldactone    Adenopathy  - has had small tender L posterior auricular adenopathy x1 month  - no associated sxs  - CBC w diff and US ordered    Anxiety  - now established with online psychiatrist, on prozac 10mg every other day      Health Maintenance   Topic Date Due    BMI FOLLOWUP  Never done    PAP SMEAR  Never done    ANNUAL PHYSICAL  06/20/2023    INFLUENZA VACCINE  Never done    COVID-19 Vaccine (3 - 2023-24 season) 09/01/2023    TDAP/TD VACCINES (2 - Td or Tdap) 03/23/2031    HEPATITIS C SCREENING  Completed    Pneumococcal Vaccine 0-64  Aged Out      Health Maintenance  - Pap smear: She will schedule with GYN  - Mammogram:  Start screening at age 40.  - Colonoscopy: Start screening at age 45.  - Immunizations: COVID and flu discussed. Tdap 2021.  - HCV: negative.  - Depression screening: negative 10/2023    Return in about 1 year (around 10/12/2024) for Annual, Labs today.

## 2023-10-13 LAB
ALBUMIN SERPL-MCNC: 4.5 G/DL (ref 3.5–5.2)
ALBUMIN/GLOB SERPL: 1.7 G/DL
ALP SERPL-CCNC: 43 U/L (ref 39–117)
ALT SERPL W P-5'-P-CCNC: 25 U/L (ref 1–33)
ANION GAP SERPL CALCULATED.3IONS-SCNC: 8 MMOL/L (ref 5–15)
AST SERPL-CCNC: 28 U/L (ref 1–32)
BILIRUB SERPL-MCNC: 1.3 MG/DL (ref 0–1.2)
BUN SERPL-MCNC: 12 MG/DL (ref 6–20)
BUN/CREAT SERPL: 13.3 (ref 7–25)
CALCIUM SPEC-SCNC: 9.9 MG/DL (ref 8.6–10.5)
CHLORIDE SERPL-SCNC: 107 MMOL/L (ref 98–107)
CHOLEST SERPL-MCNC: 145 MG/DL (ref 0–200)
CO2 SERPL-SCNC: 27 MMOL/L (ref 22–29)
CREAT SERPL-MCNC: 0.9 MG/DL (ref 0.57–1)
EGFRCR SERPLBLD CKD-EPI 2021: 89.5 ML/MIN/1.73
FERRITIN SERPL-MCNC: 41.3 NG/ML (ref 13–150)
GLOBULIN UR ELPH-MCNC: 2.7 GM/DL
GLUCOSE SERPL-MCNC: 58 MG/DL (ref 65–99)
HDLC SERPL-MCNC: 49 MG/DL (ref 40–60)
IRON 24H UR-MRATE: 185 MCG/DL (ref 37–145)
IRON SATN MFR SERPL: 40 % (ref 20–50)
LDLC SERPL CALC-MCNC: 80 MG/DL (ref 0–100)
LDLC/HDLC SERPL: 1.62 {RATIO}
POTASSIUM SERPL-SCNC: 4.4 MMOL/L (ref 3.5–5.2)
PROT SERPL-MCNC: 7.2 G/DL (ref 6–8.5)
SODIUM SERPL-SCNC: 142 MMOL/L (ref 136–145)
TIBC SERPL-MCNC: 457 MCG/DL (ref 298–536)
TRANSFERRIN SERPL-MCNC: 307 MG/DL (ref 200–360)
TRIGL SERPL-MCNC: 83 MG/DL (ref 0–150)
TSH SERPL DL<=0.05 MIU/L-ACNC: 1.52 UIU/ML (ref 0.27–4.2)
VLDLC SERPL-MCNC: 16 MG/DL (ref 5–40)

## 2023-11-09 ENCOUNTER — HOSPITAL ENCOUNTER (OUTPATIENT)
Dept: ULTRASOUND IMAGING | Facility: HOSPITAL | Age: 28
Discharge: HOME | End: 2023-11-09
Admitting: INTERNAL MEDICINE
Payer: COMMERCIAL

## 2023-11-09 DIAGNOSIS — R59.9 ADENOPATHY: ICD-10-CM

## 2023-11-09 PROCEDURE — 76536 US EXAM OF HEAD AND NECK: CPT

## 2023-11-12 DIAGNOSIS — D64.9 NORMOCYTIC ANEMIA: ICD-10-CM

## 2023-11-12 DIAGNOSIS — E80.6 HYPERBILIRUBINEMIA: Primary | ICD-10-CM

## 2023-11-12 DIAGNOSIS — E83.19 IRON EXCESS: ICD-10-CM

## 2023-11-13 ENCOUNTER — TELEPHONE (OUTPATIENT)
Dept: INTERNAL MEDICINE | Facility: CLINIC | Age: 28
End: 2023-11-13
Payer: COMMERCIAL

## 2023-11-13 DIAGNOSIS — R59.0 CERVICAL ADENOPATHY: Primary | ICD-10-CM

## 2023-11-13 NOTE — TELEPHONE ENCOUNTER
----- Message from Faye Gramajo MD sent at 11/12/2023 10:30 PM EST -----  Please call patient and let her know that ultrasound shows a lymph node however no suspicious findings were noted by the radiologist. Given it has been present for over a month I do recommend an ENT evaluation. Is she okay with this referral?

## 2023-11-13 NOTE — TELEPHONE ENCOUNTER
----- Message from Faye Gramajo MD sent at 11/12/2023 10:28 PM EST -----  Please call patient and let her know that iron is now too high so she needs to stop her iron supplement. Her liver, kidney, and thyroid function are all normal. Her diabetes screening is negative. Her anemia remains resolved. Cholesterol is well controlled. Bilirubin was slightly elevated. This is most likely lab error but I would like for her to have this and iron levels rechecked in 2-3 months.

## 2023-12-14 ENCOUNTER — LAB (OUTPATIENT)
Dept: LAB | Facility: HOSPITAL | Age: 28
End: 2023-12-14
Payer: COMMERCIAL

## 2023-12-14 DIAGNOSIS — D64.9 NORMOCYTIC ANEMIA: ICD-10-CM

## 2023-12-14 DIAGNOSIS — E80.6 HYPERBILIRUBINEMIA: ICD-10-CM

## 2023-12-14 DIAGNOSIS — E83.19 IRON EXCESS: ICD-10-CM

## 2023-12-14 LAB
DEPRECATED RDW RBC AUTO: 44.4 FL (ref 37–54)
ERYTHROCYTE [DISTWIDTH] IN BLOOD BY AUTOMATED COUNT: 13.1 % (ref 12.3–15.4)
HCT VFR BLD AUTO: 35.9 % (ref 34–46.6)
HGB BLD-MCNC: 11.6 G/DL (ref 12–15.9)
MCH RBC QN AUTO: 29.4 PG (ref 26.6–33)
MCHC RBC AUTO-ENTMCNC: 32.3 G/DL (ref 31.5–35.7)
MCV RBC AUTO: 91.1 FL (ref 79–97)
PLATELET # BLD AUTO: 258 10*3/MM3 (ref 140–450)
PMV BLD AUTO: 11.7 FL (ref 6–12)
RBC # BLD AUTO: 3.94 10*6/MM3 (ref 3.77–5.28)
WBC NRBC COR # BLD AUTO: 8.23 10*3/MM3 (ref 3.4–10.8)

## 2023-12-14 PROCEDURE — 83540 ASSAY OF IRON: CPT

## 2023-12-14 PROCEDURE — 85027 COMPLETE CBC AUTOMATED: CPT

## 2023-12-14 PROCEDURE — 80076 HEPATIC FUNCTION PANEL: CPT

## 2023-12-14 PROCEDURE — 84466 ASSAY OF TRANSFERRIN: CPT

## 2023-12-14 PROCEDURE — 82728 ASSAY OF FERRITIN: CPT

## 2023-12-15 DIAGNOSIS — G43.709 CHRONIC MIGRAINE WITHOUT AURA WITHOUT STATUS MIGRAINOSUS, NOT INTRACTABLE: ICD-10-CM

## 2023-12-15 LAB
ALBUMIN SERPL-MCNC: 4.6 G/DL (ref 3.5–5.2)
ALP SERPL-CCNC: 42 U/L (ref 39–117)
ALT SERPL W P-5'-P-CCNC: 13 U/L (ref 1–33)
AST SERPL-CCNC: 19 U/L (ref 1–32)
BILIRUB CONJ SERPL-MCNC: 0.2 MG/DL (ref 0–0.3)
BILIRUB INDIRECT SERPL-MCNC: 1.3 MG/DL
BILIRUB SERPL-MCNC: 1.5 MG/DL (ref 0–1.2)
FERRITIN SERPL-MCNC: 34.8 NG/ML (ref 13–150)
IRON 24H UR-MRATE: 114 MCG/DL (ref 37–145)
IRON SATN MFR SERPL: 25 % (ref 20–50)
PROT SERPL-MCNC: 7.3 G/DL (ref 6–8.5)
TIBC SERPL-MCNC: 460 MCG/DL (ref 298–536)
TRANSFERRIN SERPL-MCNC: 309 MG/DL (ref 200–360)

## 2023-12-15 RX ORDER — RIZATRIPTAN BENZOATE 10 MG/1
10 TABLET ORAL DAILY PRN
Qty: 12 TABLET | Refills: 1 | Status: SHIPPED | OUTPATIENT
Start: 2023-12-15

## 2023-12-17 DIAGNOSIS — E80.6 HYPERBILIRUBINEMIA: ICD-10-CM

## 2023-12-17 DIAGNOSIS — D64.9 NORMOCYTIC ANEMIA: Primary | ICD-10-CM

## 2023-12-18 ENCOUNTER — TELEPHONE (OUTPATIENT)
Dept: INTERNAL MEDICINE | Facility: CLINIC | Age: 28
End: 2023-12-18
Payer: COMMERCIAL

## 2023-12-18 NOTE — TELEPHONE ENCOUNTER
----- Message from Faye Gramajo MD sent at 12/17/2023 12:52 PM EST -----  Please call patient. Her bilirubin remains elevated. Anemia has also recurred and mild. Iron levels are normal. She does not need to resume iron. I have ordered a few additional labs.

## 2023-12-20 DIAGNOSIS — G43.709 CHRONIC MIGRAINE WITHOUT AURA WITHOUT STATUS MIGRAINOSUS, NOT INTRACTABLE: Primary | ICD-10-CM

## 2023-12-20 RX ORDER — SUMATRIPTAN 25 MG/1
TABLET, FILM COATED ORAL
Qty: 9 TABLET | Refills: 5 | Status: SHIPPED | OUTPATIENT
Start: 2023-12-20

## 2023-12-21 ENCOUNTER — LAB (OUTPATIENT)
Dept: LAB | Facility: HOSPITAL | Age: 28
End: 2023-12-21
Payer: COMMERCIAL

## 2023-12-21 DIAGNOSIS — E80.6 HYPERBILIRUBINEMIA: ICD-10-CM

## 2023-12-21 DIAGNOSIS — D64.9 NORMOCYTIC ANEMIA: ICD-10-CM

## 2023-12-21 LAB
FOLATE SERPL-MCNC: >20 NG/ML (ref 4.78–24.2)
VIT B12 BLD-MCNC: 660 PG/ML (ref 211–946)

## 2023-12-21 PROCEDURE — 83615 LACTATE (LD) (LDH) ENZYME: CPT

## 2023-12-21 PROCEDURE — 82247 BILIRUBIN TOTAL: CPT

## 2023-12-21 PROCEDURE — 82248 BILIRUBIN DIRECT: CPT

## 2023-12-21 PROCEDURE — 82607 VITAMIN B-12: CPT

## 2023-12-21 PROCEDURE — 82746 ASSAY OF FOLIC ACID SERUM: CPT

## 2023-12-21 PROCEDURE — 83010 ASSAY OF HAPTOGLOBIN QUANT: CPT

## 2023-12-22 LAB
BILIRUB CONJ SERPL-MCNC: <0.2 MG/DL (ref 0–0.3)
BILIRUB INDIRECT SERPL-MCNC: NORMAL MG/DL
BILIRUB SERPL-MCNC: 0.9 MG/DL (ref 0–1.2)
HAPTOGLOB SERPL-MCNC: 90 MG/DL (ref 30–200)
LDH SERPL-CCNC: 201 U/L (ref 135–214)

## 2024-01-01 DIAGNOSIS — D64.9 NORMOCYTIC ANEMIA: Primary | ICD-10-CM

## 2024-04-22 ENCOUNTER — OFFICE VISIT (OUTPATIENT)
Dept: INTERNAL MEDICINE | Facility: CLINIC | Age: 29
End: 2024-04-22

## 2024-04-22 VITALS
DIASTOLIC BLOOD PRESSURE: 70 MMHG | WEIGHT: 121 LBS | HEART RATE: 64 BPM | BODY MASS INDEX: 18.34 KG/M2 | SYSTOLIC BLOOD PRESSURE: 118 MMHG | OXYGEN SATURATION: 99 % | HEIGHT: 68 IN

## 2024-04-22 DIAGNOSIS — M67.431 GANGLION CYST OF WRIST, RIGHT: Primary | ICD-10-CM

## 2024-04-22 DIAGNOSIS — M54.6 CHRONIC BILATERAL THORACIC BACK PAIN: ICD-10-CM

## 2024-04-22 DIAGNOSIS — G89.29 CHRONIC BILATERAL THORACIC BACK PAIN: ICD-10-CM

## 2024-04-22 PROCEDURE — 99213 OFFICE O/P EST LOW 20 MIN: CPT | Performed by: INTERNAL MEDICINE

## 2024-04-22 NOTE — PROGRESS NOTES
Internal Medicine Acute Visit    Chief Complaint   Patient presents with    Cyst     R Wrist        HPI  Ms. Humphrey comes in today for recurrent ganglion cyst on dorsum of R wrist. It is painful with certain movements of wrist and has grown slightly since she first noticed it a month ago. She had similar cyst previously that spontaneously resolved after a few weeks. This one has been present longer prompting her to come in. She also notes chronic intermittent upper back pain between shoulder blades. She attributes this to posture at work. 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 She is using arnica because it is easier on her stomach than tylenol. Also using foam roller, PT exercises, and a laser at work.    Cyst         Review of Systems  Review of Systems   Constitutional: Negative.    Musculoskeletal:  Positive for back pain.   Skin:         Cyst on wrist        Medications  Current Outpatient Medications on File Prior to Visit   Medication Sig Dispense Refill    COLLAGEN PO Take  by mouth.      multivitamin with minerals tablet tablet Take 1 tablet by mouth Daily.      Sprintec 28 0.25-35 MG-MCG per tablet       SUMAtriptan (IMITREX) 25 MG tablet Take one tablet at onset of headache. May repeat dose one time in 2 hours if headache not relieved. 9 tablet 5    [DISCONTINUED] diclofenac (VOLTAREN) 0.1 % ophthalmic solution Administer 1 drop into the left eye 4 (Four) Times a Day. 2.5 mL 0    [DISCONTINUED] Drospiren-Eth Estrad-Levomefol 3-0.02-0.451 MG tablet TAKE 1 TABLET BY MOUTH DAILY 28 tablet 5    [DISCONTINUED] ferrous sulfate 140 (45 Fe) MG tablet controlled-release tablet Take  by mouth Daily With Breakfast.      [DISCONTINUED] FLUoxetine (PROzac) 20 MG capsule Take 1 capsule by mouth Every Other Day.      [DISCONTINUED] trimethoprim-polymyxin b (Polytrim) 05610-7.1 UNIT/ML-% ophthalmic solution Administer 1 drop into the left eye Every 4 (Four) Hours. 10 mL 0     No current facility-administered  "medications on file prior to visit.        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   Constitutional:       General: She is not in acute distress.     Appearance: She is well-developed. She is not ill-appearing or toxic-appearing.   HENT:      Head: Normocephalic and atraumatic.   Eyes:      Conjunctiva/sclera: Conjunctivae normal.   Pulmonary:      Effort: Pulmonary effort is normal. No respiratory distress.   Musculoskeletal:         General: Tenderness (along border of both scapula) present. No swelling or deformity.   Skin:     General: Skin is warm and dry.      Comments: Ganglion cyst on dorsum of R wrist   Neurological:      Mental Status: She is alert and oriented to person, place, and time. Mental status is at baseline.         Results  Results for orders placed or performed in visit on 12/21/23   Haptoglobin    Specimen: Blood   Result Value Ref Range    Haptoglobin 90 30 - 200 mg/dL   Lactate Dehydrogenase    Specimen: Blood   Result Value Ref Range     135 - 214 U/L   Vitamin B12    Specimen: Blood   Result Value Ref Range    Vitamin B-12 660 211 - 946 pg/mL   Folate    Specimen: Blood   Result Value Ref Range    Folate >20.00 4.78 - 24.20 ng/mL   Bilirubin, Total & Direct    Specimen: Blood   Result Value Ref Range    Total Bilirubin 0.9 0.0 - 1.2 mg/dL    Bilirubin, Direct <0.2 0.0 - 0.3 mg/dL    Bilirubin, Indirect  "         Assessment and Plan  Diagnoses and all orders for this visit:    Ganglion cyst of wrist, right  - referral to hand surgery    Chronic bilateral thoracic back pain  - likely due to posture at work  - managing with PT exercises at home, OTC analgesics    Health Maintenance  - Pap smear: She will schedule with GYN  - Mammogram: Start screening at age 40.  - Colonoscopy: Start screening at age 45.  - Immunizations: COVID discussed. Tdap 2021.  - HCV: negative.  - Depression screening: negative 10/2023    Return for Next scheduled follow up.

## 2024-10-08 ENCOUNTER — PATIENT ROUNDING (BHMG ONLY) (OUTPATIENT)
Dept: URGENT CARE | Facility: CLINIC | Age: 29
End: 2024-10-08
Payer: COMMERCIAL

## 2024-10-10 ENCOUNTER — TELEPHONE (OUTPATIENT)
Dept: INTERNAL MEDICINE | Facility: CLINIC | Age: 29
End: 2024-10-10
Payer: COMMERCIAL

## 2024-10-10 ENCOUNTER — TELEPHONE (OUTPATIENT)
Dept: URGENT CARE | Facility: CLINIC | Age: 29
End: 2024-10-10

## 2024-10-10 DIAGNOSIS — R05.3 COUGH, PERSISTENT: Primary | ICD-10-CM

## 2024-10-10 RX ORDER — BENZONATATE 200 MG/1
200 CAPSULE ORAL 3 TIMES DAILY PRN
Qty: 30 CAPSULE | Refills: 0 | Status: SHIPPED | OUTPATIENT
Start: 2024-10-10 | End: 2024-10-18

## 2024-10-10 NOTE — TELEPHONE ENCOUNTER
Benzonatate sent to pharmacy; if no improvement, patient should follow up with her PCP for further evaluation

## 2024-10-10 NOTE — TELEPHONE ENCOUNTER
PATIENT HAS CALLED AND STATED SHE WAS SEEN IN URGENT CARE MONDAY FOR COUGH. PATIENT WAS PRESCRIBED ANTIBIOTIC AND INHALER. PATIENT HAS HAD MINIMAL IMPROVEMENT. PATIENT IS REQUESTING A CALL BACK TO ADVISE ON WHAT THE NEXT STEPS SHOULD BE IN HER PLAN OF CARE.     CALL BACK NUMBER [phone]

## 2024-10-11 NOTE — TELEPHONE ENCOUNTER
Per notes sounds like a postviral syndrome that will take time to resolve. She was prescribed tessalon by  yesterday. If still not improving she would need an appointment.

## 2024-10-11 NOTE — TELEPHONE ENCOUNTER
Spoke with patient regarding note  Patient hadn't picked up script due to it not being ready Patient is going to pick it up today and will call back and schedule appointment Monday if medication doesn't help or symptoms don't improve  No further questions at this time

## 2024-10-18 ENCOUNTER — OFFICE VISIT (OUTPATIENT)
Dept: INTERNAL MEDICINE | Facility: CLINIC | Age: 29
End: 2024-10-18
Payer: COMMERCIAL

## 2024-10-18 VITALS
OXYGEN SATURATION: 100 % | SYSTOLIC BLOOD PRESSURE: 108 MMHG | BODY MASS INDEX: 17.76 KG/M2 | WEIGHT: 117.2 LBS | HEART RATE: 58 BPM | DIASTOLIC BLOOD PRESSURE: 84 MMHG | HEIGHT: 68 IN

## 2024-10-18 DIAGNOSIS — R05.8 POST-VIRAL COUGH SYNDROME: Primary | ICD-10-CM

## 2024-10-18 PROCEDURE — 99213 OFFICE O/P EST LOW 20 MIN: CPT | Performed by: INTERNAL MEDICINE

## 2024-10-18 RX ORDER — METHYLPREDNISOLONE 4 MG
TABLET, DOSE PACK ORAL
Qty: 21 TABLET | Refills: 0 | Status: SHIPPED | OUTPATIENT
Start: 2024-10-18 | End: 2024-10-23

## 2024-10-18 RX ORDER — FLUTICASONE PROPIONATE 50 UG/1
1-2 SPRAY, METERED NASAL NIGHTLY PRN
Qty: 16 G | Refills: 0 | Status: SHIPPED | OUTPATIENT
Start: 2024-10-18

## 2024-10-18 NOTE — PROGRESS NOTES
Internal Medicine Acute Visit    Chief Complaint   Patient presents with    Cough     09/26 congestion started then eventually turned in to coughing fits  10/07 went to Urgent care was given rx for tessalon pearles/ doxycycline/ inhaler. Pt feels her cough has gotten better but worse at night when she lays down she is unable to stop coughing been using humidifier and fan, with minor relief.           HPI  Ms. Humphrey comes in today due to cough. Was sick after returning from [place] in late September. Did telehealth and diagnosed as viral. 2 weeks later was in UC and given doxycycline, albuterol, and tessalon. Has slowly improved but with lingering cough. Disrupts her sleep. Ribs hurt from coughing. Difficult to use the inhaler when needed due to coughing fits. Has been able to work. No fever.    Cough  This is a recurrent problem. The current episode started 1 to 4 weeks ago. The problem has been improving. The problem occurs hourly. The cough is Productive of clear sputum and productive of yellow sputum. Associated symptoms include ear congestion, headaches, nasal congestion, postnasal drip, rhinorrhea, shortness of breath and wheezing. Pertinent negatives include no chest pain, chills, ear pain, fever, heartburn, hemoptysis, myalgias, rash, sore throat, sweats or weight loss. The symptoms are aggravated by cold air, exercise and lying down. Risk factors for lung disease include animal exposure.        Review of Systems  Review of Systems   Constitutional:  Positive for fatigue. Negative for chills, fever and unexpected weight loss.   HENT:  Positive for postnasal drip and rhinorrhea. Negative for ear pain and sore throat.    Respiratory:  Positive for cough, shortness of breath and wheezing. Negative for hemoptysis.    Cardiovascular:  Negative for chest pain.   Musculoskeletal:  Negative for myalgias.   Skin:  Negative for rash.   Psychiatric/Behavioral:  Positive for sleep disturbance.         Medications  Current  "Outpatient Medications on File Prior to Visit   Medication Sig Dispense Refill    albuterol sulfate  (90 Base) MCG/ACT inhaler Inhale 2 puffs Every 6 (Six) Hours As Needed for Wheezing. 18 g 0    COLLAGEN PO Take  by mouth.      multivitamin with minerals tablet tablet Take 1 tablet by mouth Daily.      Sprintec 28 0.25-35 MG-MCG per tablet       SUMAtriptan (IMITREX) 25 MG tablet Take one tablet at onset of headache. May repeat dose one time in 2 hours if headache not relieved. 9 tablet 5    [DISCONTINUED] benzonatate (TESSALON) 200 MG capsule Take 1 capsule by mouth 3 (Three) Times a Day As Needed for Cough. (Patient not taking: Reported on 10/18/2024) 30 capsule 0    [DISCONTINUED] Drospiren-Eth Estrad-Levomefol 3-0.02-0.451 MG tablet TAKE 1 TABLET BY MOUTH DAILY 28 tablet 5     No current facility-administered medications on file prior to visit.        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   Constitutional:       General: She is not in acute distress.     Appearance: Normal appearance. She is well-developed. She is not ill-appearing or toxic-appearing.   HENT:      Head: Normocephalic and atraumatic.   Eyes:      Conjunctiva/sclera: Conjunctivae normal.   Cardiovascular:      Rate and Rhythm: Normal rate and regular rhythm.      Heart sounds: Normal heart sounds.   Pulmonary:      Effort: Pulmonary effort is " normal. No respiratory distress.      Breath sounds: Normal breath sounds.   Skin:     General: Skin is warm and dry.   Neurological:      Mental Status: She is alert and oriented to person, place, and time. Mental status is at baseline.      Gait: Gait normal.   Psychiatric:         Mood and Affect: Mood normal.         Behavior: Behavior normal.         Results  Results for orders placed or performed in visit on 12/21/23   Haptoglobin    Collection Time: 12/21/23 11:51 AM    Specimen: Blood   Result Value Ref Range    Haptoglobin 90 30 - 200 mg/dL   Lactate Dehydrogenase    Collection Time: 12/21/23 11:51 AM    Specimen: Blood   Result Value Ref Range     135 - 214 U/L   Vitamin B12    Collection Time: 12/21/23 11:51 AM    Specimen: Blood   Result Value Ref Range    Vitamin B-12 660 211 - 946 pg/mL   Folate    Collection Time: 12/21/23 11:51 AM    Specimen: Blood   Result Value Ref Range    Folate >20.00 4.78 - 24.20 ng/mL   Bilirubin, Total & Direct    Collection Time: 12/21/23 11:51 AM    Specimen: Blood   Result Value Ref Range    Total Bilirubin 0.9 0.0 - 1.2 mg/dL    Bilirubin, Direct <0.2 0.0 - 0.3 mg/dL    Bilirubin, Indirect          Assessment and Plan  Diagnoses and all orders for this visit:    Post-viral cough syndrome  - flonase nightly, medrol dosepak, and OTC delsym BID  - RTC if not improving    Health Maintenance  - Pap smear: She will schedule with GYN  - Mammogram: Start screening at age 40.  - Colonoscopy: Start screening at age 45.  - Immunizations: COVID and flu deferred. Tdap 2021.  - HCV: negative.  - Depression screening: negative 10/2023    No follow-ups on file.

## 2024-11-15 DIAGNOSIS — R05.8 POST-VIRAL COUGH SYNDROME: ICD-10-CM

## 2024-11-15 RX ORDER — FLUTICASONE PROPIONATE 50 MCG
SPRAY, SUSPENSION (ML) NASAL
Qty: 16 G | Refills: 0 | Status: SHIPPED | OUTPATIENT
Start: 2024-11-15